# Patient Record
Sex: MALE | Race: WHITE | HISPANIC OR LATINO | Employment: UNEMPLOYED | URBAN - METROPOLITAN AREA
[De-identification: names, ages, dates, MRNs, and addresses within clinical notes are randomized per-mention and may not be internally consistent; named-entity substitution may affect disease eponyms.]

---

## 2020-09-22 ENCOUNTER — TELEPHONE (OUTPATIENT)
Dept: ONCOLOGY | Facility: CLINIC | Age: 40
End: 2020-09-22

## 2020-09-22 NOTE — TELEPHONE ENCOUNTER
Pt is requesting his wife Sanaz come to his new pt appt.     He has a bad speech impairment and cannot relay information.    Best call back # [phone]

## 2020-09-22 NOTE — TELEPHONE ENCOUNTER
----- Message from Mirta Alcazar RN sent at 9/22/2020  2:36 PM EDT -----  Regarding: VISITOR REQUEST  Pt is requesting his wife Sanaz come to his new pt appt.      He has a bad speech impairment and cannot relay information.     Best call back # [phone]

## 2020-10-08 NOTE — PROGRESS NOTES
.     REASON FOR CONSULTATION: Thrombocytopenia, iron deficiency anemia, alcoholic cirrhosis  Provide an opinion on any further workup or treatment                             REQUESTING PHYSICIAN: Omar Mims PA-C  RECORDS OBTAINED:  Records of the patient's history including those obtained from the referring provider were reviewed and summarized in detail.    HISTORY OF PRESENT ILLNESS:  The patient is a 40 y.o. year old male  who is here for follow-up with the above-mentioned history.    Last PCP note to 9/10/2020 reviewed  Recent hospitalization due to complications related alcoholism, bleeding esophageal varices, ascites.  Treated with esophageal banding, transfusion, paracentesis, 0.9 L removed.  Diagnosed with end-stage cirrhosis.  Sober x6 weeks.  Planned to move to [place] from [place] as he lost his job.  Was working as a .  Drank a lot more after he lost his job.  He was told he needed a liver transplant.  Active in  and motivated to stay sober.    Referred to Dr. Denis Magaña for cirrhosis.  Referred to Dr. Allyssa Choudhary for paroxysmal atrial fibrillation.    Referred to us for low platelets.    On 9/10/2020, WBC 5.4, Hb 9.1, .  Total bilirubin 2.2.  AST 69.  ALT 40.  Ferritin 14.  9% saturation.    Patient is here with his mother-in-law who is a retired nurse.    Denies bleeding other than one occasion of some blood on toilet paper at the end of wiping.  No blood mixed in stools.    Past Medical History:   Diagnosis Date   • Bleeding esophageal varices (CMS/HCC)    • Cirrhosis (CMS/HCC)     end-stage   • H/O PAF (paroxysmal atrial fibrillation) (CMS/HCC)    • Hepatosplenomegaly     secondary to cirrhosis   • History of alcohol abuse    • History of anemia    • History of ascites    • History of sepsis    • History of transfusion    • Hyperlipidemia      Past Surgical History:   Procedure Laterality Date   • ENDOSCOPY W/ BANDING  2020    varices   • HERNIA REPAIR     • PARACENTESIS       "900 cc fluid drained       MEDICATIONS    Current Outpatient Medications:   •  furosemide (LASIX) 20 MG tablet, Take 20 mg by mouth 2 (Two) Times a Day., Disp: , Rfl:   •  nadolol (CORGARD) 40 MG tablet, Take 40 mg by mouth Daily., Disp: , Rfl:   •  spironolactone (ALDACTONE) 50 MG tablet, Take 50 mg by mouth Daily., Disp: , Rfl:     ALLERGIES:   No Known Allergies    SOCIAL HISTORY:       Social History     Socioeconomic History   • Marital status:      Spouse name: Not on file   • Number of children: Not on file   • Years of education: Not on file   • Highest education level: Not on file   Occupational History   • Occupation: /     Comment: [employer] in [place]-lost his job     Employer: UNEMPLOYED   Tobacco Use   • Smoking status: Current Every Day Smoker     Packs/day: 1.00     Types: Cigarettes   Substance and Sexual Activity   • Alcohol use: Not Currently     Comment: H/O alcoholism   Social History Narrative    ** Merged History Encounter **              FAMILY HISTORY:  Family History   Problem Relation Age of Onset   • Ovarian cancer Mother    • Throat cancer Father        REVIEW OF SYSTEMS:  Review of Systems   Constitutional: Negative for activity change.   HENT: Negative for nosebleeds and trouble swallowing.    Respiratory: Negative for shortness of breath and wheezing.    Cardiovascular: Negative for chest pain and palpitations.   Gastrointestinal: Negative for constipation, diarrhea and nausea.   Genitourinary: Negative for dysuria and hematuria.   Musculoskeletal: Negative for arthralgias and myalgias.   Neurological: Negative for seizures and syncope.   Hematological: Negative for adenopathy. Does not bruise/bleed easily.   Psychiatric/Behavioral: Negative for confusion.              Vitals:    10/09/20 1349   BP: 96/65   Pulse: 66   Resp: 16   Temp: 98.2 °F (36.8 °C)   TempSrc: Skin   SpO2: 99%   Weight: 70 kg (154 lb 6.4 oz)   Height: 176.2 cm (69.37\")  Comment: NEW "   PainSc: 0-No pain     Current Status 10/9/2020   ECOG score 0      PHYSICAL EXAM:        CONSTITUTIONAL:  Vital signs reviewed.  No distress, looks comfortable.  EYES:  Conjunctiva and lids unremarkable.  PERRLA  EARS,NOSE,MOUTH,THROAT:  Ears and nose appear unremarkable.  Lips, teeth, gums appear unremarkable.  RESPIRATORY:  Normal respiratory effort.  Lungs clear to auscultation bilaterally.  CARDIOVASCULAR:  Normal S1, S2.  No murmurs rubs or gallops.  No significant lower extremity edema.  GASTROINTESTINAL: Abdomen appears unremarkable.  Nontender.  No hepatomegaly.  No splenomegaly.  LYMPHATIC:  No cervical, supraclavicular, axillary lymphadenopathy.  SKIN:  Warm.  No rashes.  PSYCHIATRIC:  Normal judgment and insight.  Normal mood and affect.         RECENT LABS:        WBC   Date Value Ref Range Status   10/09/2020 6.22 3.40 - 10.80 10*3/mm3 Final     Hemoglobin   Date Value Ref Range Status   10/09/2020 11.3 (L) 13.0 - 17.7 g/dL Final     Platelets   Date Value Ref Range Status   10/09/2020 92 (L) 140 - 450 10*3/mm3 Final   10/09/2020 95 (L) 140 - 450 10*3/mm3 Final       Assessment/Plan   Thrombocytopenia (CMS/HCC)  - Vitamin B12  - Folate RBC  - Immature Platelet Fraction  - Ferritin  - Iron Profile  - Retic With IRF & RET-He  - CBC & Differential  - Immature Platelet Fraction    Anemia, unspecified type  - Vitamin B12  - Folate RBC  - Immature Platelet Fraction  - Ferritin  - Iron Profile  - Retic With IRF & RET-He  - CBC & Differential    El Jiang     *Thrombocytopenia, due to alcoholic cirrhosis  · Unfortunately, there is no treatment to improve thrombocytopenia due to alcoholic cirrhosis.  I encouraged continued alcohol cessation, to try to avoid worsening of thrombocytopenia.  PLT 95    *Iron deficiency anemia.  · On 9/10/2020,  Hb 9.1, Ferritin 14.  9% saturation.  · Oral iron 1 tablet daily started 9/10/2020.  · On 10/9/2020, Hb 11.3 (improved with oral iron).  · Continue oral  iron    *Alcoholic cirrhosis  · He was told previously he needs a liver transplant.  · Sometime around August 2020, while in [place], hospitalization with banding of esophageal varices and 0.9 L ascitic fluid removed.  · He was referred to Dr. Denis Magaña    *Alcoholism  · No alcohol since 8/4/2020    *Paroxysmal atrial fibrillation.  Has an appointment with Dr. Allyssa Choudhary    Plan  · Continue oral iron  · Check B12 and RBC folate studies and IPF  · MD roughly 2 months.  Iron labs 1 week prior  · I do not expect his PLT count to improve.    His mother-in-law, retired nurse, assisted with history.  Peripheral smear was personally reviewed by me and looks unremarkable, other than mild thrombocytopenia      Send copy this to  Dr. Ardian Ibrahim, PCP

## 2020-10-09 ENCOUNTER — LAB (OUTPATIENT)
Dept: OTHER | Facility: HOSPITAL | Age: 40
End: 2020-10-09

## 2020-10-09 ENCOUNTER — CONSULT (OUTPATIENT)
Dept: ONCOLOGY | Facility: CLINIC | Age: 40
End: 2020-10-09

## 2020-10-09 VITALS
DIASTOLIC BLOOD PRESSURE: 65 MMHG | SYSTOLIC BLOOD PRESSURE: 96 MMHG | RESPIRATION RATE: 16 BRPM | HEIGHT: 69 IN | WEIGHT: 154.4 LBS | BODY MASS INDEX: 22.87 KG/M2 | TEMPERATURE: 98.2 F | OXYGEN SATURATION: 99 % | HEART RATE: 66 BPM

## 2020-10-09 DIAGNOSIS — D64.9 ANEMIA, UNSPECIFIED TYPE: ICD-10-CM

## 2020-10-09 DIAGNOSIS — D69.6 THROMBOCYTOPENIA (HCC): Primary | ICD-10-CM

## 2020-10-09 DIAGNOSIS — D69.6 THROMBOCYTOPENIC (HCC): Primary | ICD-10-CM

## 2020-10-09 LAB
BASOPHILS # BLD AUTO: 0.06 10*3/MM3 (ref 0–0.2)
BASOPHILS NFR BLD AUTO: 1 % (ref 0–1.5)
DEPRECATED RDW RBC AUTO: 65.3 FL (ref 37–54)
EOSINOPHIL # BLD AUTO: 0.41 10*3/MM3 (ref 0–0.4)
EOSINOPHIL NFR BLD AUTO: 6.6 % (ref 0.3–6.2)
ERYTHROCYTE [DISTWIDTH] IN BLOOD BY AUTOMATED COUNT: 20.7 % (ref 12.3–15.4)
HCT VFR BLD AUTO: 34.9 % (ref 37.5–51)
HGB BLD-MCNC: 11.3 G/DL (ref 13–17.7)
IMM GRANULOCYTES # BLD AUTO: 0.08 10*3/MM3 (ref 0–0.05)
IMM GRANULOCYTES NFR BLD AUTO: 1.3 % (ref 0–0.5)
LYMPHOCYTES # BLD AUTO: 1.7 10*3/MM3 (ref 0.7–3.1)
LYMPHOCYTES NFR BLD AUTO: 27.3 % (ref 19.6–45.3)
MCH RBC QN AUTO: 27.8 PG (ref 26.6–33)
MCHC RBC AUTO-ENTMCNC: 32.4 G/DL (ref 31.5–35.7)
MCV RBC AUTO: 86 FL (ref 79–97)
MONOCYTES # BLD AUTO: 0.9 10*3/MM3 (ref 0.1–0.9)
MONOCYTES NFR BLD AUTO: 14.5 % (ref 5–12)
NEUTROPHILS NFR BLD AUTO: 3.07 10*3/MM3 (ref 1.7–7)
NEUTROPHILS NFR BLD AUTO: 49.3 % (ref 42.7–76)
NRBC BLD AUTO-RTO: 0 /100 WBC (ref 0–0.2)
PLATELET # BLD AUTO: 92 10*3/MM3 (ref 140–450)
PLATELET # BLD AUTO: 95 10*3/MM3 (ref 140–450)
PLATELETS.RETICULATED NFR BLD AUTO: 4.1 % (ref 0.9–6.5)
PMV BLD AUTO: 11.2 FL (ref 6–12)
RBC # BLD AUTO: 4.06 10*6/MM3 (ref 4.14–5.8)
VIT B12 BLD-MCNC: 1490 PG/ML (ref 211–946)
WBC # BLD AUTO: 6.22 10*3/MM3 (ref 3.4–10.8)

## 2020-10-09 PROCEDURE — 99205 OFFICE O/P NEW HI 60 MIN: CPT | Performed by: INTERNAL MEDICINE

## 2020-10-09 PROCEDURE — 85025 COMPLETE CBC W/AUTO DIFF WBC: CPT | Performed by: INTERNAL MEDICINE

## 2020-10-09 PROCEDURE — 36415 COLL VENOUS BLD VENIPUNCTURE: CPT

## 2020-10-09 PROCEDURE — 85055 RETICULATED PLATELET ASSAY: CPT | Performed by: INTERNAL MEDICINE

## 2020-10-09 PROCEDURE — 82607 VITAMIN B-12: CPT | Performed by: INTERNAL MEDICINE

## 2020-10-09 RX ORDER — FUROSEMIDE 20 MG/1
40 TABLET ORAL DAILY
COMMUNITY
End: 2020-10-27

## 2020-10-09 RX ORDER — SPIRONOLACTONE 50 MG/1
50 TABLET, FILM COATED ORAL DAILY
COMMUNITY
End: 2020-10-27 | Stop reason: SDUPTHER

## 2020-10-09 RX ORDER — NADOLOL 40 MG/1
40 TABLET ORAL DAILY
COMMUNITY
End: 2020-10-27 | Stop reason: SDUPTHER

## 2020-10-13 ENCOUNTER — OFFICE VISIT (OUTPATIENT)
Dept: CARDIOLOGY | Facility: CLINIC | Age: 40
End: 2020-10-13

## 2020-10-13 VITALS
HEIGHT: 69 IN | WEIGHT: 157 LBS | DIASTOLIC BLOOD PRESSURE: 82 MMHG | SYSTOLIC BLOOD PRESSURE: 116 MMHG | BODY MASS INDEX: 23.25 KG/M2 | HEART RATE: 69 BPM

## 2020-10-13 DIAGNOSIS — F17.200 TOBACCO DEPENDENCE: ICD-10-CM

## 2020-10-13 DIAGNOSIS — D69.6 THROMBOCYTOPENIA (HCC): ICD-10-CM

## 2020-10-13 DIAGNOSIS — K70.31 ALCOHOLIC CIRRHOSIS OF LIVER WITH ASCITES (HCC): Chronic | ICD-10-CM

## 2020-10-13 DIAGNOSIS — I48.0 PAF (PAROXYSMAL ATRIAL FIBRILLATION) (HCC): Primary | Chronic | ICD-10-CM

## 2020-10-13 DIAGNOSIS — I85.11 SECONDARY ESOPHAGEAL VARICES WITH BLEEDING (HCC): ICD-10-CM

## 2020-10-13 DIAGNOSIS — F10.11 HISTORY OF ALCOHOL ABUSE: Chronic | ICD-10-CM

## 2020-10-13 PROCEDURE — 99203 OFFICE O/P NEW LOW 30 MIN: CPT | Performed by: INTERNAL MEDICINE

## 2020-10-13 PROCEDURE — 93000 ELECTROCARDIOGRAM COMPLETE: CPT | Performed by: INTERNAL MEDICINE

## 2020-10-13 NOTE — PROGRESS NOTES
Subjective:     Encounter Date:10/13/20      Patient ID: El Jiang is a 40 y.o. male.    Chief Complaint: AFIB  History of Present Illness    Dear Dr. Ibrahim,    I had the pleasure of seeing this patient in the office today for initial evaluation and consultation.  I appreciate that you sent him in to see us.  They come in today to be seen for cardiac evaluation; he potentially had an episode of atrial fibrillation when he was in the hospital in [place].    Patient was recently hospitalized in [place] at the [facility] there.  He presented with melena and GI bleed.  He has a history of heavy alcohol abuse, cirrhosis, and esophageal varices.  Hemoglobin was apparently 5.3 on arrival.  His mother-in-law, who is a retired nurse, states that he had atrial fibrillation for a couple of hours when he first presented.  I have gone through all the records that we have and I cannot find any documentation at all of atrial fibrillation.  There is no EKG that was obtained while he had that, and there is no mention in any the physician notes of atrial fibrillation.  I have looked at the ER notes, and the only reference I find in the nursing notes is to tachycardia.  So I am not sure if he truly had an episode of atrial fibrillation or whether he was simply tachycardia because of the physiologic stress.  He was not seen by cardiology while he was there, nor did he have any cardiac testing.    He is here now to follow-up for this potential episode of atrial fibrillation.  He is never had that before.  He has not had any sensation of palpitations or tachycardia since.  He used to drink heavy amounts of bourbon, but he has been sober.  They are now looking at potential liver transplant although he will be seeing Dr. Magaña for an initial visit soon.    This patient has no known cardiac history.  This patient has no history of coronary artery disease, congestive heart failure, rheumatic fever, rheumatic  heart disease, congenital heart disease or heart murmur.  This patient has never required invasive cardiovascular evaluation.    He also has thrombocytopenia, secondary to his cirrhosis, is just recently been seen by Dr. Conley.    The following portions of the patient's history were reviewed and updated as appropriate: allergies, current medications, past family history, past medical history, past social history, past surgical history and problem list.    Past Medical History:   Diagnosis Date   • Bleeding esophageal varices (CMS/HCC)    • Cirrhosis (CMS/HCC)     end-stage   • H/O PAF (paroxysmal atrial fibrillation) (CMS/HCC)    • Hepatosplenomegaly     secondary to cirrhosis   • History of alcohol abuse    • History of anemia    • History of ascites    • History of sepsis    • History of transfusion    • Hyperlipidemia        Past Surgical History:   Procedure Laterality Date   • ENDOSCOPY W/ BANDING  2020    varices   • HERNIA REPAIR     • PARACENTESIS      900 cc fluid drained       Social History     Socioeconomic History   • Marital status:      Spouse name: Not on file   • Number of children: Not on file   • Years of education: Not on file   • Highest education level: Not on file   Occupational History   • Occupation: /     Comment: [employer] in [place]-lost his job     Employer: UNEMPLOYED   Tobacco Use   • Smoking status: Current Every Day Smoker     Packs/day: 1.00     Types: Cigarettes   • Smokeless tobacco: Never Used   Substance and Sexual Activity   • Alcohol use: Not Currently     Comment: H/O alcoholism   Social History Narrative    ** Merged History Encounter **            Review of Systems   Constitution: Positive for malaise/fatigue. Negative for chills, decreased appetite, fever and night sweats.   HENT: Negative for ear discharge, ear pain, hearing loss, nosebleeds and sore throat.    Eyes: Negative for blurred vision, double vision and pain.   Cardiovascular: Negative  "for cyanosis.   Respiratory: Negative for hemoptysis and sputum production.    Endocrine: Negative for cold intolerance and heat intolerance.   Hematologic/Lymphatic: Negative for adenopathy.   Skin: Negative for dry skin, itching, nail changes, rash and suspicious lesions.   Musculoskeletal: Negative for arthritis, gout, muscle cramps, muscle weakness, myalgias and neck pain.   Gastrointestinal: Negative for anorexia, bowel incontinence, constipation, diarrhea, dysphagia, hematemesis and jaundice.   Genitourinary: Negative for bladder incontinence, dysuria, flank pain, frequency, hematuria and nocturia.   Neurological: Negative for focal weakness, numbness, paresthesias and seizures.   Psychiatric/Behavioral: Negative for altered mental status, hallucinations, hypervigilance, suicidal ideas and thoughts of violence.   Allergic/Immunologic: Negative for persistent infections.         ECG 12 Lead    Date/Time: 10/13/2020 1:53 PM  Performed by: Rupert Leroy III, MD  Authorized by: Rupert Leroy III, MD   Comparison: compared with previous ECG   Similar to previous ECG  Rhythm: sinus rhythm  Rate: normal  Conduction: conduction normal  ST Segments: ST segments normal  T Waves: T waves normal  QRS axis: normal  Other: no other findings    Clinical impression: normal ECG               Objective:     Vitals:    10/13/20 1305   BP: 116/82   Pulse: 69   Weight: 71.2 kg (157 lb)   Height: 176.2 cm (69.37\")         Vitals signs reviewed.   Constitutional:       General: Not in acute distress.     Appearance: Well-developed. Not diaphoretic.   Eyes:      General:         Right eye: No discharge.         Left eye: No discharge.      Conjunctiva/sclera: Conjunctivae normal.      Pupils: Pupils are equal, round, and reactive to light.   HENT:      Head: Normocephalic and atraumatic.      Nose: Nose normal.   Neck:      Musculoskeletal: Normal range of motion and neck supple.      Thyroid: No thyromegaly.      Trachea: No " tracheal deviation.      Lymphadenopathy: No cervical adenopathy.   Pulmonary:      Effort: Pulmonary effort is normal. No respiratory distress.      Breath sounds: Normal breath sounds. No stridor.   Chest:      Chest wall: Not tender to palpatation.   Cardiovascular:      Normal rate. Regular rhythm.      Murmurs: There is no murmur.      . No S3 gallop. No click. No rub.   Pulses:     Intact distal pulses.   Edema:     Peripheral edema absent.   Abdominal:      General: Bowel sounds are normal. There is no distension.      Palpations: Abdomen is soft. There is no abdominal mass.      Tenderness: There is no abdominal tenderness. There is no guarding or rebound.   Musculoskeletal: Normal range of motion.         General: No tenderness or deformity.   Skin:     General: Skin is warm and dry.      Findings: No erythema or rash.   Neurological:      Mental Status: Alert and oriented to person, place, and time.      Deep Tendon Reflexes: Reflexes are normal and symmetric.   Psychiatric:         Thought Content: Thought content normal.         Lab Review:             Performed        Assessment:          Diagnosis Plan   1. PAF (paroxysmal atrial fibrillation) (CMS/HCC)  Adult Transthoracic Echo Complete W/ Cont if Necessary Per Protocol    Holter Monitor - 24 Hour   2. Alcoholic cirrhosis of liver with ascites (CMS/HCC)     3. Secondary esophageal varices with bleeding (CMS/HCC)     4. Thrombocytopenia (CMS/HCC)     5. History of alcohol abuse     6. Tobacco dependence            Plan:       1.?  PAF- his mother-in-law who is a retired nurse understands that he had A. fib, although I cannot find this documented anywhere in the records.  He is on nadolol for his esophageal varices.  Given the question whether he had A. fib, along with his extreme alcoholic intake, I would arrange for an echocardiogram to be obtained to assess valvular and ventricular function, I will also have him wear a 24-hour Holter monitor.   Further evaluation and treatment will then be predicated the results  2.  Alcoholic cirrhosis with ascites and varices, recent bleeding varices-to be seen by Dr. Magaña of GI symptoms  3.  Tobacco dependence- ultimately, smoking cessation will be warranted and recommended, although certainly currently his focus is on staying sober and not starting alcohol intake again.    Thank you very much for allowing us to participate in the care of this pleasant patient.  Please don't hesitate to call if I can be of assistance in any way.      Current Outpatient Medications:   •  furosemide (LASIX) 20 MG tablet, Take 20 mg by mouth 2 (Two) Times a Day., Disp: , Rfl:   •  nadolol (CORGARD) 40 MG tablet, Take 40 mg by mouth Daily., Disp: , Rfl:   •  spironolactone (ALDACTONE) 50 MG tablet, Take 50 mg by mouth Daily., Disp: , Rfl:          No follow-ups on file.

## 2020-10-27 ENCOUNTER — OFFICE VISIT (OUTPATIENT)
Dept: GASTROENTEROLOGY | Facility: CLINIC | Age: 40
End: 2020-10-27

## 2020-10-27 VITALS — BODY MASS INDEX: 24.14 KG/M2 | WEIGHT: 165.2 LBS

## 2020-10-27 DIAGNOSIS — I85.10 SECONDARY ESOPHAGEAL VARICES WITHOUT BLEEDING (HCC): ICD-10-CM

## 2020-10-27 DIAGNOSIS — K70.31 ALCOHOLIC CIRRHOSIS OF LIVER WITH ASCITES (HCC): Primary | ICD-10-CM

## 2020-10-27 PROCEDURE — 99204 OFFICE O/P NEW MOD 45 MIN: CPT | Performed by: INTERNAL MEDICINE

## 2020-10-27 RX ORDER — NADOLOL 40 MG/1
40 TABLET ORAL DAILY
Qty: 90 TABLET | Refills: 3 | Status: SHIPPED | OUTPATIENT
Start: 2020-10-27 | End: 2021-03-17 | Stop reason: SDUPTHER

## 2020-10-27 RX ORDER — FERROUS SULFATE TAB EC 324 MG (65 MG FE EQUIVALENT) 324 (65 FE) MG
324 TABLET DELAYED RESPONSE ORAL
COMMUNITY

## 2020-10-27 RX ORDER — FUROSEMIDE 40 MG/1
40 TABLET ORAL DAILY
COMMUNITY
End: 2020-12-31 | Stop reason: SDUPTHER

## 2020-10-27 RX ORDER — SPIRONOLACTONE 50 MG/1
50 TABLET, FILM COATED ORAL DAILY
Qty: 90 TABLET | Refills: 3 | Status: SHIPPED | OUTPATIENT
Start: 2020-10-27 | End: 2021-02-12

## 2020-10-27 NOTE — PROGRESS NOTES
Chief Complaint   Patient presents with   • Jaundice   • Anemia   • Cirrhosis     El Jiang is a 40 y.o. male who presents with a History of cirrhosis with varices   HPI     Patient 40-year-old male with history of alcohol abuse as well as hyperlipidemia and cirrhosis.  Patient moved from [place] where he underwent band ligation of esophageal varices that were bleeding now here to establish care.Patient denies abdominal pain no nausea vomiting fever chills and no darkening of the urine or lightening of the stool.  Patient does complain of ongoing fatigue but otherwise doing well.Review of the chart from [place] otherwise unremarkable did's note that he was negative for hepatitis A B or C.    Past Medical History:   Diagnosis Date   • Bleeding esophageal varices (CMS/HCC)    • Cirrhosis (CMS/HCC)     end-stage   • H/O PAF (paroxysmal atrial fibrillation) (CMS/HCC)    • Hepatosplenomegaly     secondary to cirrhosis   • History of alcohol abuse    • History of anemia    • History of ascites    • History of sepsis    • History of transfusion    • Hyperlipidemia        Current Outpatient Medications:   •  ferrous sulfate 324 (65 Fe) MG tablet delayed-release EC tablet, Take 324 mg by mouth Daily With Breakfast., Disp: , Rfl:   •  furosemide (LASIX) 20 MG tablet, Take 40 mg by mouth Daily., Disp: , Rfl:   •  furosemide (LASIX) 40 MG tablet, Take 40 mg by mouth Daily., Disp: , Rfl:   •  nadolol (CORGARD) 40 MG tablet, Take 40 mg by mouth Daily., Disp: , Rfl:   •  spironolactone (ALDACTONE) 50 MG tablet, Take 50 mg by mouth Daily., Disp: , Rfl:   No Known Allergies  Social History     Socioeconomic History   • Marital status:      Spouse name: Not on file   • Number of children: Not on file   • Years of education: Not on file   • Highest education level: Not on file   Occupational History   • Occupation: /     Comment: [employer] in [place]-lost his job     Employer: UNEMPLOYED   Tobacco Use    • Smoking status: Current Every Day Smoker     Packs/day: 1.00     Types: Cigarettes   • Smokeless tobacco: Never Used   Substance and Sexual Activity   • Alcohol use: Not Currently     Comment: H/O alcoholism   Social History Narrative    ** Merged History Encounter **          Family History   Problem Relation Age of Onset   • Ovarian cancer Mother    • Throat cancer Father      Review of Systems   Constitutional: Positive for fatigue. Negative for activity change, appetite change, chills, diaphoresis, fever and unexpected weight change.   HENT: Negative.    Eyes: Negative.    Respiratory: Negative.    Cardiovascular: Negative.    Gastrointestinal: Negative.    Endocrine: Negative.    Musculoskeletal: Negative.    Skin: Negative.    Allergic/Immunologic: Negative.    Hematological: Negative.      There were no vitals filed for this visit.  Physical Exam  Vitals signs and nursing note reviewed.   Constitutional:       Appearance: He is well-developed.   HENT:      Head: Normocephalic and atraumatic.   Eyes:      General: No scleral icterus.     Conjunctiva/sclera: Conjunctivae normal.      Pupils: Pupils are equal, round, and reactive to light.   Neck:      Musculoskeletal: Normal range of motion.      Trachea: No tracheal deviation.   Cardiovascular:      Rate and Rhythm: Normal rate and regular rhythm.      Heart sounds: No murmur. No friction rub. No gallop.    Pulmonary:      Effort: Pulmonary effort is normal. No respiratory distress.      Breath sounds: Normal breath sounds. No wheezing or rales.   Abdominal:      General: Bowel sounds are normal. There is no distension.      Palpations: Abdomen is soft. There is no mass.      Tenderness: There is no abdominal tenderness. There is no guarding or rebound.   Musculoskeletal: Normal range of motion.         General: No swelling or tenderness.   Skin:     General: Skin is warm and dry.      Coloration: Skin is not jaundiced.      Findings: No rash.    Neurological:      General: No focal deficit present.      Mental Status: He is alert and oriented to person, place, and time.   Psychiatric:         Behavior: Behavior normal.         Judgment: Judgment normal.       Diagnoses and all orders for this visit:    Alcoholic cirrhosis of liver with ascites (CMS/HCC)  -     Comprehensive Metabolic Panel  -     CBC (No Diff)  -     Protime-INR  -     Hepatitis A Antibody, Total  -     Hepatitis B Surface Antibody  -     Case Request; Standing  -     Implement Anesthesia orders day of procedure.; Standing  -     Obtain informed consent; Standing  -     Case Request    Secondary esophageal varices without bleeding (CMS/HCC)  -     Case Request; Standing  -     Implement Anesthesia orders day of procedure.; Standing  -     Obtain informed consent; Standing  -     Case Request    Other orders  -     ferrous sulfate 324 (65 Fe) MG tablet delayed-release EC tablet; Take 324 mg by mouth Daily With Breakfast.  -     furosemide (LASIX) 40 MG tablet; Take 40 mg by mouth Daily.    Patient 40-year-old male with history of alcohol cirrhosis moved from [place] here to establish care.  Patient reports sober since 8/4/2020.  Patient hospitalized at that time found with esophageal varices and ascites status post paracentesis.  Repeat paracentesis was negative for significant residual.Patient currently complaining of fatigue with decreased lower extremity edema otherwise doing well with good appetite.  Will arrange labs to check meld score, arrange follow-up EGD as patient had band ligation performed in August and no repeat.Will monitor clinically and refer to transplant when indicated

## 2020-10-28 LAB
ALBUMIN SERPL-MCNC: 3.8 G/DL (ref 3.5–5.2)
ALBUMIN/GLOB SERPL: 1.2 G/DL
ALP SERPL-CCNC: 67 U/L (ref 39–117)
ALT SERPL-CCNC: 40 U/L (ref 1–41)
AST SERPL-CCNC: 71 U/L (ref 1–40)
BILIRUB SERPL-MCNC: 1.3 MG/DL (ref 0–1.2)
BUN SERPL-MCNC: 12 MG/DL (ref 6–20)
BUN/CREAT SERPL: 20.7 (ref 7–25)
CALCIUM SERPL-MCNC: 9.4 MG/DL (ref 8.6–10.5)
CHLORIDE SERPL-SCNC: 106 MMOL/L (ref 98–107)
CO2 SERPL-SCNC: 23.8 MMOL/L (ref 22–29)
CREAT SERPL-MCNC: 0.58 MG/DL (ref 0.76–1.27)
ERYTHROCYTE [DISTWIDTH] IN BLOOD BY AUTOMATED COUNT: 19.6 % (ref 12.3–15.4)
GLOBULIN SER CALC-MCNC: 3.1 GM/DL
GLUCOSE SERPL-MCNC: 104 MG/DL (ref 65–99)
HAV AB SER QL IA: POSITIVE
HBV SURFACE AB SER QL: NON REACTIVE
HCT VFR BLD AUTO: 34.7 % (ref 37.5–51)
HGB BLD-MCNC: 11.7 G/DL (ref 13–17.7)
INR PPP: 1.49 (ref 0.9–1.1)
MCH RBC QN AUTO: 29.1 PG (ref 26.6–33)
MCHC RBC AUTO-ENTMCNC: 33.7 G/DL (ref 31.5–35.7)
MCV RBC AUTO: 86.3 FL (ref 79–97)
PLATELET # BLD AUTO: 89 10*3/MM3 (ref 140–450)
POTASSIUM SERPL-SCNC: 4.1 MMOL/L (ref 3.5–5.2)
PROT SERPL-MCNC: 6.9 G/DL (ref 6–8.5)
PROTHROMBIN TIME: 17.7 SECONDS (ref 11.7–14.2)
RBC # BLD AUTO: 4.02 10*6/MM3 (ref 4.14–5.8)
SODIUM SERPL-SCNC: 140 MMOL/L (ref 136–145)
WBC # BLD AUTO: 5.65 10*3/MM3 (ref 3.4–10.8)

## 2020-11-02 ENCOUNTER — TRANSCRIBE ORDERS (OUTPATIENT)
Dept: SLEEP MEDICINE | Facility: HOSPITAL | Age: 40
End: 2020-11-02

## 2020-11-02 DIAGNOSIS — Z01.818 OTHER SPECIFIED PRE-OPERATIVE EXAMINATION: Primary | ICD-10-CM

## 2020-11-03 ENCOUNTER — TRANSCRIBE ORDERS (OUTPATIENT)
Dept: ADMINISTRATIVE | Facility: HOSPITAL | Age: 40
End: 2020-11-03

## 2020-11-03 ENCOUNTER — TELEPHONE (OUTPATIENT)
Dept: GASTROENTEROLOGY | Facility: CLINIC | Age: 40
End: 2020-11-03

## 2020-11-03 NOTE — TELEPHONE ENCOUNTER
----- Message from Sanaz Levy sent at 11/3/2020  2:52 PM EST -----  Contact: [phone]  Patient has a question regarding medication.

## 2020-11-04 NOTE — TELEPHONE ENCOUNTER
Returned phone call to patient's spoke with patient's spouse. She states Dr. Magaña was going to order something to help the patient sleep at night. Advised will send an update to dr. Magaña she verb understanding.

## 2020-11-04 NOTE — TELEPHONE ENCOUNTER
Meld score 12, await EGD.  We dont perscribe sleeping pills.  Need to f/u with PMD, soory if I gave the impression that is something we do

## 2020-11-10 ENCOUNTER — TELEPHONE (OUTPATIENT)
Dept: CARDIOLOGY | Facility: CLINIC | Age: 40
End: 2020-11-10

## 2020-11-10 ENCOUNTER — HOSPITAL ENCOUNTER (OUTPATIENT)
Dept: CARDIOLOGY | Facility: HOSPITAL | Age: 40
Discharge: HOME | End: 2020-11-10
Admitting: INTERNAL MEDICINE

## 2020-11-10 VITALS
OXYGEN SATURATION: 99 % | DIASTOLIC BLOOD PRESSURE: 60 MMHG | SYSTOLIC BLOOD PRESSURE: 90 MMHG | HEART RATE: 70 BPM | BODY MASS INDEX: 24.44 KG/M2 | WEIGHT: 165 LBS | HEIGHT: 69 IN

## 2020-11-10 DIAGNOSIS — I48.0 PAF (PAROXYSMAL ATRIAL FIBRILLATION) (HCC): ICD-10-CM

## 2020-11-10 PROCEDURE — 93306 TTE W/DOPPLER COMPLETE: CPT

## 2020-11-10 PROCEDURE — 93306 TTE W/DOPPLER COMPLETE: CPT | Performed by: INTERNAL MEDICINE

## 2020-11-10 NOTE — TELEPHONE ENCOUNTER
Attempted to call pt. No answer and no option to leave a VM. Will continue to try to reach him.    Thank you,    Rachelle Crook RN  Triage [facility]

## 2020-11-10 NOTE — TELEPHONE ENCOUNTER
----- Message from Rupert Leroy III, MD sent at 11/10/2020  2:50 PM EST -----  Please call- normal results

## 2020-11-11 ENCOUNTER — TELEPHONE (OUTPATIENT)
Dept: GASTROENTEROLOGY | Facility: CLINIC | Age: 40
End: 2020-11-11

## 2020-11-11 LAB
AORTIC ARCH: 2.2 CM
ASCENDING AORTA: 2.6 CM
BH CV ECHO MEAS - ACS: 2.6 CM
BH CV ECHO MEAS - AO MAX PG (FULL): 3.7 MMHG
BH CV ECHO MEAS - AO MAX PG: 8.1 MMHG
BH CV ECHO MEAS - AO MEAN PG (FULL): 2 MMHG
BH CV ECHO MEAS - AO MEAN PG: 4.5 MMHG
BH CV ECHO MEAS - AO ROOT AREA (BSA CORRECTED): 1.8
BH CV ECHO MEAS - AO ROOT AREA: 8.7 CM^2
BH CV ECHO MEAS - AO ROOT DIAM: 3.3 CM
BH CV ECHO MEAS - AO V2 MAX: 142.5 CM/SEC
BH CV ECHO MEAS - AO V2 MEAN: 100.9 CM/SEC
BH CV ECHO MEAS - AO V2 VTI: 31.2 CM
BH CV ECHO MEAS - ASC AORTA: 2.6 CM
BH CV ECHO MEAS - AVA(I,A): 2 CM^2
BH CV ECHO MEAS - AVA(I,D): 2 CM^2
BH CV ECHO MEAS - AVA(V,A): 2.4 CM^2
BH CV ECHO MEAS - AVA(V,D): 2.4 CM^2
BH CV ECHO MEAS - BSA(HAYCOCK): 1.9 M^2
BH CV ECHO MEAS - BSA: 1.9 M^2
BH CV ECHO MEAS - BZI_BMI: 24.4 KILOGRAMS/M^2
BH CV ECHO MEAS - BZI_METRIC_HEIGHT: 175.3 CM
BH CV ECHO MEAS - BZI_METRIC_WEIGHT: 74.8 KG
BH CV ECHO MEAS - EDV(MOD-SP2): 86 ML
BH CV ECHO MEAS - EDV(MOD-SP4): 89 ML
BH CV ECHO MEAS - EDV(TEICH): 128.2 ML
BH CV ECHO MEAS - EF(CUBED): 67.4 %
BH CV ECHO MEAS - EF(MOD-BP): 67 %
BH CV ECHO MEAS - EF(MOD-SP2): 65.1 %
BH CV ECHO MEAS - EF(MOD-SP4): 69.7 %
BH CV ECHO MEAS - EF(TEICH): 58.6 %
BH CV ECHO MEAS - ESV(MOD-SP2): 30 ML
BH CV ECHO MEAS - ESV(MOD-SP4): 27 ML
BH CV ECHO MEAS - ESV(TEICH): 53.1 ML
BH CV ECHO MEAS - FS: 31.2 %
BH CV ECHO MEAS - IVS/LVPW: 1
BH CV ECHO MEAS - IVSD: 1 CM
BH CV ECHO MEAS - LAT PEAK E' VEL: 12 CM/SEC
BH CV ECHO MEAS - LV DIASTOLIC VOL/BSA (35-75): 46.8 ML/M^2
BH CV ECHO MEAS - LV MASS(C)D: 194.2 GRAMS
BH CV ECHO MEAS - LV MASS(C)DI: 102 GRAMS/M^2
BH CV ECHO MEAS - LV MAX PG: 4.4 MMHG
BH CV ECHO MEAS - LV MEAN PG: 2.5 MMHG
BH CV ECHO MEAS - LV SYSTOLIC VOL/BSA (12-30): 14.2 ML/M^2
BH CV ECHO MEAS - LV V1 MAX: 105.4 CM/SEC
BH CV ECHO MEAS - LV V1 MEAN: 71.9 CM/SEC
BH CV ECHO MEAS - LV V1 VTI: 19 CM
BH CV ECHO MEAS - LVIDD: 5.2 CM
BH CV ECHO MEAS - LVIDS: 3.6 CM
BH CV ECHO MEAS - LVLD AP2: 7.9 CM
BH CV ECHO MEAS - LVLD AP4: 7.5 CM
BH CV ECHO MEAS - LVLS AP2: 6.1 CM
BH CV ECHO MEAS - LVLS AP4: 5.5 CM
BH CV ECHO MEAS - LVOT AREA (M): 3.1 CM^2
BH CV ECHO MEAS - LVOT AREA: 3.2 CM^2
BH CV ECHO MEAS - LVOT DIAM: 2 CM
BH CV ECHO MEAS - LVPWD: 1 CM
BH CV ECHO MEAS - MED PEAK E' VEL: 10.3 CM/SEC
BH CV ECHO MEAS - MV A DUR: 0.1 SEC
BH CV ECHO MEAS - MV A MAX VEL: 49.8 CM/SEC
BH CV ECHO MEAS - MV DEC SLOPE: 389.6 CM/SEC^2
BH CV ECHO MEAS - MV DEC TIME: 0.28 SEC
BH CV ECHO MEAS - MV E MAX VEL: 71.1 CM/SEC
BH CV ECHO MEAS - MV E/A: 1.4
BH CV ECHO MEAS - MV MAX PG: 4.4 MMHG
BH CV ECHO MEAS - MV MEAN PG: 1.8 MMHG
BH CV ECHO MEAS - MV P1/2T MAX VEL: 99.3 CM/SEC
BH CV ECHO MEAS - MV P1/2T: 74.6 MSEC
BH CV ECHO MEAS - MV V2 MAX: 104.7 CM/SEC
BH CV ECHO MEAS - MV V2 MEAN: 63.7 CM/SEC
BH CV ECHO MEAS - MV V2 VTI: 38 CM
BH CV ECHO MEAS - MVA P1/2T LCG: 2.2 CM^2
BH CV ECHO MEAS - MVA(P1/2T): 2.9 CM^2
BH CV ECHO MEAS - MVA(VTI): 1.6 CM^2
BH CV ECHO MEAS - PA ACC TIME: 0.07 SEC
BH CV ECHO MEAS - PA MAX PG (FULL): 0.49 MMHG
BH CV ECHO MEAS - PA MAX PG: 2.7 MMHG
BH CV ECHO MEAS - PA PR(ACCEL): 45.7 MMHG
BH CV ECHO MEAS - PA V2 MAX: 82.8 CM/SEC
BH CV ECHO MEAS - PULM A REVS DUR: 0.08 SEC
BH CV ECHO MEAS - PULM A REVS VEL: 26.6 CM/SEC
BH CV ECHO MEAS - PULM DIAS VEL: 54.4 CM/SEC
BH CV ECHO MEAS - PULM S/D: 1.1
BH CV ECHO MEAS - PULM SYS VEL: 57.4 CM/SEC
BH CV ECHO MEAS - PVA(V,A): 3.6 CM^2
BH CV ECHO MEAS - PVA(V,D): 3.6 CM^2
BH CV ECHO MEAS - QP/QS: 0.91
BH CV ECHO MEAS - RAP SYSTOLE: 8 MMHG
BH CV ECHO MEAS - RV MAX PG: 2.2 MMHG
BH CV ECHO MEAS - RV MEAN PG: 1.4 MMHG
BH CV ECHO MEAS - RV V1 MAX: 75 CM/SEC
BH CV ECHO MEAS - RV V1 MEAN: 56.2 CM/SEC
BH CV ECHO MEAS - RV V1 VTI: 14.1 CM
BH CV ECHO MEAS - RVOT AREA: 4 CM^2
BH CV ECHO MEAS - RVOT DIAM: 2.2 CM
BH CV ECHO MEAS - RVSP: 20 MMHG
BH CV ECHO MEAS - SI(AO): 143.1 ML/M^2
BH CV ECHO MEAS - SI(CUBED): 49.2 ML/M^2
BH CV ECHO MEAS - SI(LVOT): 32.3 ML/M^2
BH CV ECHO MEAS - SI(MOD-SP2): 29.4 ML/M^2
BH CV ECHO MEAS - SI(MOD-SP4): 32.6 ML/M^2
BH CV ECHO MEAS - SI(TEICH): 39.5 ML/M^2
BH CV ECHO MEAS - SUP REN AO DIAM: 2 CM
BH CV ECHO MEAS - SV(AO): 272.5 ML
BH CV ECHO MEAS - SV(CUBED): 93.6 ML
BH CV ECHO MEAS - SV(LVOT): 61.4 ML
BH CV ECHO MEAS - SV(MOD-SP2): 56 ML
BH CV ECHO MEAS - SV(MOD-SP4): 62 ML
BH CV ECHO MEAS - SV(RVOT): 56.1 ML
BH CV ECHO MEAS - SV(TEICH): 75.1 ML
BH CV ECHO MEAS - TAPSE (>1.6): 2 CM
BH CV ECHO MEAS - TR MAX VEL: 221.3 CM/SEC
BH CV ECHO MEASUREMENTS AVERAGE E/E' RATIO: 6.38
BH CV VAS BP RIGHT ARM: NORMAL MMHG
BH CV XLRA - RV BASE: 3.4 CM
BH CV XLRA - RV LENGTH: 7.3 CM
BH CV XLRA - RV MID: 3.3 CM
BH CV XLRA - TDI S': 10.4 CM/SEC
LEFT ATRIUM VOLUME INDEX: 16 ML/M2
MAXIMAL PREDICTED HEART RATE: 178 BPM
SINUS: 3.1 CM
STJ: 2.8 CM
STRESS TARGET HR: 151 BPM

## 2020-11-11 NOTE — TELEPHONE ENCOUNTER
----- Message from Erik Magaña MD sent at 11/1/2020  1:03 PM EST -----  Protein levels good, need vaccine for Hepatitis B, f/u at EGD

## 2020-11-12 NOTE — TELEPHONE ENCOUNTER
Notified pt's wife. She verbalized understanding.    Thank you,    Rachelle Crook, RN  Triage [facility]

## 2020-11-16 ENCOUNTER — LAB (OUTPATIENT)
Dept: LAB | Facility: HOSPITAL | Age: 40
End: 2020-11-16

## 2020-11-16 DIAGNOSIS — Z01.818 OTHER SPECIFIED PRE-OPERATIVE EXAMINATION: ICD-10-CM

## 2020-11-16 PROCEDURE — U0004 COV-19 TEST NON-CDC HGH THRU: HCPCS

## 2020-11-16 PROCEDURE — C9803 HOPD COVID-19 SPEC COLLECT: HCPCS

## 2020-11-17 LAB — SARS-COV-2 RNA RESP QL NAA+PROBE: NOT DETECTED

## 2020-11-18 ENCOUNTER — ANESTHESIA (OUTPATIENT)
Dept: GASTROENTEROLOGY | Facility: HOSPITAL | Age: 40
End: 2020-11-18

## 2020-11-18 ENCOUNTER — HOSPITAL ENCOUNTER (OUTPATIENT)
Facility: HOSPITAL | Age: 40
Setting detail: HOSPITAL OUTPATIENT SURGERY
Discharge: HOME | End: 2020-11-18
Attending: INTERNAL MEDICINE | Admitting: INTERNAL MEDICINE

## 2020-11-18 ENCOUNTER — ANESTHESIA EVENT (OUTPATIENT)
Dept: GASTROENTEROLOGY | Facility: HOSPITAL | Age: 40
End: 2020-11-18

## 2020-11-18 VITALS
HEIGHT: 69 IN | HEART RATE: 62 BPM | WEIGHT: 161 LBS | SYSTOLIC BLOOD PRESSURE: 114 MMHG | RESPIRATION RATE: 16 BRPM | BODY MASS INDEX: 23.85 KG/M2 | DIASTOLIC BLOOD PRESSURE: 78 MMHG | OXYGEN SATURATION: 100 %

## 2020-11-18 DIAGNOSIS — I85.10 SECONDARY ESOPHAGEAL VARICES WITHOUT BLEEDING (HCC): ICD-10-CM

## 2020-11-18 DIAGNOSIS — K70.31 ALCOHOLIC CIRRHOSIS OF LIVER WITH ASCITES (HCC): ICD-10-CM

## 2020-11-18 PROCEDURE — 43244 EGD VARICES LIGATION: CPT | Performed by: INTERNAL MEDICINE

## 2020-11-18 PROCEDURE — 25010000002 PROPOFOL 10 MG/ML EMULSION: Performed by: ANESTHESIOLOGY

## 2020-11-18 RX ORDER — SODIUM CHLORIDE, SODIUM LACTATE, POTASSIUM CHLORIDE, CALCIUM CHLORIDE 600; 310; 30; 20 MG/100ML; MG/100ML; MG/100ML; MG/100ML
1000 INJECTION, SOLUTION INTRAVENOUS CONTINUOUS
Status: DISCONTINUED | OUTPATIENT
Start: 2020-11-18 | End: 2020-11-18 | Stop reason: HOSPADM

## 2020-11-18 RX ORDER — SUCRALFATE 1 G/1
1 TABLET ORAL 4 TIMES DAILY
Qty: 120 TABLET | Refills: 0 | Status: SHIPPED | OUTPATIENT
Start: 2020-11-18 | End: 2021-03-17 | Stop reason: SDUPTHER

## 2020-11-18 RX ORDER — LIDOCAINE HYDROCHLORIDE 20 MG/ML
INJECTION, SOLUTION INFILTRATION; PERINEURAL AS NEEDED
Status: DISCONTINUED | OUTPATIENT
Start: 2020-11-18 | End: 2020-11-18 | Stop reason: SURG

## 2020-11-18 RX ORDER — PROPOFOL 10 MG/ML
VIAL (ML) INTRAVENOUS AS NEEDED
Status: DISCONTINUED | OUTPATIENT
Start: 2020-11-18 | End: 2020-11-18 | Stop reason: SURG

## 2020-11-18 RX ADMIN — PROPOFOL 50 MG: 10 INJECTION, EMULSION INTRAVENOUS at 11:59

## 2020-11-18 RX ADMIN — SODIUM CHLORIDE, POTASSIUM CHLORIDE, SODIUM LACTATE AND CALCIUM CHLORIDE 1000 ML: 600; 310; 30; 20 INJECTION, SOLUTION INTRAVENOUS at 11:21

## 2020-11-18 RX ADMIN — PROPOFOL 50 MG: 10 INJECTION, EMULSION INTRAVENOUS at 11:55

## 2020-11-18 RX ADMIN — PROPOFOL 50 MG: 10 INJECTION, EMULSION INTRAVENOUS at 12:03

## 2020-11-18 RX ADMIN — LIDOCAINE HYDROCHLORIDE 40 MG: 20 INJECTION, SOLUTION INFILTRATION; PERINEURAL at 11:53

## 2020-11-18 NOTE — ANESTHESIA PREPROCEDURE EVALUATION
Anesthesia Evaluation     Patient summary reviewed and Nursing notes reviewed   NPO Solid Status: > 8 hours  NPO Liquid Status: > 2 hours           Airway   Mallampati: II  TM distance: >3 FB  Neck ROM: full  Dental - normal exam     Pulmonary - normal exam   (+) a smoker Current Smoked day of surgery,   Cardiovascular - normal exam    (+) dysrhythmias Atrial Fib, hyperlipidemia,     ROS comment: Alcoholic cirrhosis of liver with ascites     Neuro/Psych- negative ROS  GI/Hepatic/Renal/Endo    (+)  GI bleeding , liver disease,     ROS Comment: Hepatosplenomegaly    Musculoskeletal (-) negative ROS    Abdominal    Substance History - negative use     OB/GYN negative ob/gyn ROS         Other                        Anesthesia Plan    ASA 4     MAC       Anesthetic plan, all risks, benefits, and alternatives have been provided, discussed and informed consent has been obtained with: patient.

## 2020-11-18 NOTE — BRIEF OP NOTE
ESOPHAGOGASTRODUODENOSCOPY  Progress Note    El Jiang  11/18/2020    Pre-op Diagnosis:   Alcoholic cirrhosis of liver with ascites (CMS/HCC) [K70.31]  Secondary esophageal varices without bleeding (CMS/HCC) [I85.10]       Post-Op Diagnosis Codes:     * Alcoholic cirrhosis of liver with ascites (CMS/HCC) [K70.31]     * Secondary esophageal varices without bleeding (CMS/HCC) [I85.10]     * Portal hypertensive gastropathy (CMS/HCC) [K76.6, K31.89]    Procedure/CPT® Codes:        Procedure(s):  ESOPHAGOGASTRODUODENOSCOPY with esophageal banding x3    Surgeon(s):  Erik Magaña MD    Anesthesia: Monitored Anesthesia Care    Staff:   Endo Technician: Vito Wilkins RN; Pattie Pritchard RN  Endo Nurse: Susan Jasso RN         Estimated Blood Loss: minimal    Urine Voided: * No values recorded between 11/18/2020 11:43 AM and 11/18/2020 12:06 PM *    Specimens:                None          Drains: * No LDAs found *    Findings: EGD revealed several residual varices from the band ligation performed x3 to good effect.  Portal hypertensive gastropathy was identified and duodenal mucosa was normal.    Complications: None          Erik Magaña MD     Date: 11/18/2020  Time: 12:06 EST

## 2020-11-18 NOTE — ANESTHESIA POSTPROCEDURE EVALUATION
"Patient: El Jiang    Procedure Summary     Date: 11/18/20 Room / Location: [facility] ENDOSCOPY 8 /  [facility]    Anesthesia Start: 1145 Anesthesia Stop: 1210    Procedure: ESOPHAGOGASTRODUODENOSCOPY with esophageal banding x3 (N/A Esophagus) Diagnosis:       Alcoholic cirrhosis of liver with ascites (CMS/HCC)      Secondary esophageal varices without bleeding (CMS/HCC)      Portal hypertensive gastropathy (CMS/HCC)      (Alcoholic cirrhosis of liver with ascites (CMS/HCC) [K70.31])      (Secondary esophageal varices without bleeding (CMS/HCC) [I85.10])    Surgeon: Erik Magaña MD Provider: Praful Linder MD    Anesthesia Type: MAC ASA Status: 4          Anesthesia Type: MAC    Vitals  No vitals data found for the desired time range.          Post Anesthesia Care and Evaluation    Patient location during evaluation: bedside  Patient participation: complete - patient participated  Level of consciousness: awake  Pain score: 2  Pain management: adequate  Airway patency: patent  Anesthetic complications: No anesthetic complications  PONV Status: none  Cardiovascular status: acceptable  Respiratory status: acceptable  Hydration status: acceptable    Comments: /70 (BP Location: Left arm, Patient Position: Lying)   Pulse 59   Resp 18   Ht 175.3 cm (69\")   Wt 73 kg (161 lb)   SpO2 100%   BMI 23.78 kg/m²         "

## 2020-12-11 ENCOUNTER — LAB (OUTPATIENT)
Dept: OTHER | Facility: HOSPITAL | Age: 40
End: 2020-12-11

## 2020-12-11 DIAGNOSIS — D69.6 THROMBOCYTOPENIA (HCC): ICD-10-CM

## 2020-12-11 DIAGNOSIS — D64.9 ANEMIA, UNSPECIFIED TYPE: ICD-10-CM

## 2020-12-11 LAB
BASOPHILS # BLD AUTO: 0.04 10*3/MM3 (ref 0–0.2)
BASOPHILS NFR BLD AUTO: 0.9 % (ref 0–1.5)
DEPRECATED RDW RBC AUTO: 53.3 FL (ref 37–54)
EOSINOPHIL # BLD AUTO: 0.32 10*3/MM3 (ref 0–0.4)
EOSINOPHIL NFR BLD AUTO: 6.8 % (ref 0.3–6.2)
ERYTHROCYTE [DISTWIDTH] IN BLOOD BY AUTOMATED COUNT: 15.8 % (ref 12.3–15.4)
FERRITIN SERPL-MCNC: 102.6 NG/ML (ref 30–400)
HCT VFR BLD AUTO: 39.7 % (ref 37.5–51)
HGB BLD-MCNC: 13.1 G/DL (ref 13–17.7)
HGB RETIC QN AUTO: 35.7 PG (ref 29.8–36.1)
IMM GRANULOCYTES # BLD AUTO: 0.01 10*3/MM3 (ref 0–0.05)
IMM GRANULOCYTES NFR BLD AUTO: 0.2 % (ref 0–0.5)
IMM RETICS NFR: 5.5 % (ref 3–15.8)
IRON 24H UR-MRATE: 133 MCG/DL (ref 59–158)
IRON SATN MFR SERPL: 34 % (ref 20–50)
LYMPHOCYTES # BLD AUTO: 1.25 10*3/MM3 (ref 0.7–3.1)
LYMPHOCYTES NFR BLD AUTO: 26.7 % (ref 19.6–45.3)
MCH RBC QN AUTO: 30.3 PG (ref 26.6–33)
MCHC RBC AUTO-ENTMCNC: 33 G/DL (ref 31.5–35.7)
MCV RBC AUTO: 91.7 FL (ref 79–97)
MONOCYTES # BLD AUTO: 0.4 10*3/MM3 (ref 0.1–0.9)
MONOCYTES NFR BLD AUTO: 8.5 % (ref 5–12)
NEUTROPHILS NFR BLD AUTO: 2.66 10*3/MM3 (ref 1.7–7)
NEUTROPHILS NFR BLD AUTO: 56.9 % (ref 42.7–76)
NRBC BLD AUTO-RTO: 0 /100 WBC (ref 0–0.2)
PLATELET # BLD AUTO: 81 10*3/MM3 (ref 140–450)
PLATELET # BLD AUTO: 81 10*3/MM3 (ref 140–450)
PLATELETS.RETICULATED NFR BLD AUTO: 4 % (ref 0.9–6.5)
PMV BLD AUTO: 10.4 FL (ref 6–12)
RBC # BLD AUTO: 4.33 10*6/MM3 (ref 4.14–5.8)
RETICS # AUTO: 0.06 10*6/MM3 (ref 0.02–0.13)
RETICS/RBC NFR AUTO: 1.37 % (ref 0.7–1.9)
TIBC SERPL-MCNC: 395 MCG/DL (ref 298–536)
TRANSFERRIN SERPL-MCNC: 265 MG/DL (ref 200–360)
WBC # BLD AUTO: 4.68 10*3/MM3 (ref 3.4–10.8)

## 2020-12-11 PROCEDURE — 82728 ASSAY OF FERRITIN: CPT | Performed by: INTERNAL MEDICINE

## 2020-12-11 PROCEDURE — 84466 ASSAY OF TRANSFERRIN: CPT | Performed by: INTERNAL MEDICINE

## 2020-12-11 PROCEDURE — 36415 COLL VENOUS BLD VENIPUNCTURE: CPT

## 2020-12-11 PROCEDURE — 85055 RETICULATED PLATELET ASSAY: CPT | Performed by: INTERNAL MEDICINE

## 2020-12-11 PROCEDURE — 83540 ASSAY OF IRON: CPT | Performed by: INTERNAL MEDICINE

## 2020-12-11 PROCEDURE — 85025 COMPLETE CBC W/AUTO DIFF WBC: CPT | Performed by: INTERNAL MEDICINE

## 2020-12-11 PROCEDURE — 85046 RETICYTE/HGB CONCENTRATE: CPT | Performed by: INTERNAL MEDICINE

## 2020-12-18 ENCOUNTER — OFFICE VISIT (OUTPATIENT)
Dept: ONCOLOGY | Facility: CLINIC | Age: 40
End: 2020-12-18

## 2020-12-18 ENCOUNTER — APPOINTMENT (OUTPATIENT)
Dept: OTHER | Facility: HOSPITAL | Age: 40
End: 2020-12-18

## 2020-12-18 VITALS
OXYGEN SATURATION: 100 % | WEIGHT: 155.5 LBS | SYSTOLIC BLOOD PRESSURE: 107 MMHG | RESPIRATION RATE: 18 BRPM | BODY MASS INDEX: 23.03 KG/M2 | TEMPERATURE: 98 F | HEART RATE: 70 BPM | HEIGHT: 69 IN | DIASTOLIC BLOOD PRESSURE: 67 MMHG

## 2020-12-18 DIAGNOSIS — D69.6 THROMBOCYTOPENIA (HCC): Primary | ICD-10-CM

## 2020-12-18 PROCEDURE — 99214 OFFICE O/P EST MOD 30 MIN: CPT | Performed by: INTERNAL MEDICINE

## 2020-12-18 NOTE — PROGRESS NOTES
"    .     REASON FOR FOLLOWUP : Thrombocytopenia, iron deficiency anemia, alcoholic cirrhosis    HISTORY OF PRESENT ILLNESS:  The patient is a 40 y.o. year old male  who is here for follow-up with the above-mentioned history.    No new issues.  Continues to have intermittent mild amounts of blood in his stools.  States Dr. Magaña is aware of this and just had a EGD 11/18/2020    No chest pain or shortness of breath.  No GI side effects from oral iron.  Continues oral iron once per day.    Past Medical History:   Diagnosis Date   • Acquired stuttering     WIFE STATES HE CAN BE HARD TO UNDERSTAND.. \"[place] ACCENT AND STUTTERS\"   • Bleeding esophageal varices (CMS/HCC)    • Cirrhosis (CMS/HCC)     end-stage   • H/O PAF (paroxysmal atrial fibrillation) (CMS/HCC)    • Hepatosplenomegaly     secondary to cirrhosis   • History of alcohol abuse    • History of anemia    • History of ascites    • History of sepsis    • History of transfusion    • Hyperlipidemia      Past Surgical History:   Procedure Laterality Date   • ENDOSCOPY N/A 11/18/2020    Procedure: ESOPHAGOGASTRODUODENOSCOPY with esophageal banding x3;  Surgeon: Erik Magaña MD;  Location: [facility] ENDOSCOPY;  Service: Gastroenterology;  Laterality: N/A;  pre - cirrhosis of the liver and varices  post - gastritis, portal hypertensive gastropathy   • ENDOSCOPY W/ BANDING  2020    varices   • HERNIA REPAIR     • PARACENTESIS      900 cc fluid drained       MEDICATIONS    Current Outpatient Medications:   •  ferrous sulfate 324 (65 Fe) MG tablet delayed-release EC tablet, Take 324 mg by mouth Daily With Breakfast., Disp: , Rfl:   •  furosemide (LASIX) 40 MG tablet, Take 40 mg by mouth Daily., Disp: , Rfl:   •  nadolol (CORGARD) 40 MG tablet, Take 1 tablet by mouth Daily., Disp: 90 tablet, Rfl: 3  •  spironolactone (ALDACTONE) 50 MG tablet, Take 1 tablet by mouth Daily., Disp: 90 tablet, Rfl: 3  •  sucralfate (Carafate) 1 g tablet, Take 1 tablet by mouth 4 (Four) " "Times a Day. Dissolve in 10 to 15 cc water, Disp: 120 tablet, Rfl: 0    ALLERGIES:   No Known Allergies    SOCIAL HISTORY:       Social History     Socioeconomic History   • Marital status:      Spouse name: Not on file   • Number of children: Not on file   • Years of education: Not on file   • Highest education level: Not on file   Occupational History   • Occupation: /     Comment: [employer] in [place]-lost his job     Employer: UNEMPLOYED   Tobacco Use   • Smoking status: Current Every Day Smoker     Packs/day: 1.00     Types: Cigarettes   • Smokeless tobacco: Never Used   Substance and Sexual Activity   • Alcohol use: Not Currently     Comment: H/O alcoholism   • Drug use: Yes     Frequency: 1.0 times per week     Types: Marijuana   • Sexual activity: Defer   Social History Narrative    ** Merged History Encounter **              FAMILY HISTORY:  Family History   Problem Relation Age of Onset   • Ovarian cancer Mother    • Throat cancer Father    • Malig Hyperthermia Neg Hx        REVIEW OF SYSTEMS:  Review of Systems   Constitutional: Negative for activity change.   HENT: Negative for nosebleeds and trouble swallowing.    Respiratory: Negative for shortness of breath and wheezing.    Cardiovascular: Negative for chest pain and palpitations.   Gastrointestinal: Negative for constipation, diarrhea and nausea.   Genitourinary: Negative for dysuria and hematuria.   Musculoskeletal: Negative for arthralgias and myalgias.   Neurological: Negative for seizures and syncope.   Hematological: Negative for adenopathy. Does not bruise/bleed easily.   Psychiatric/Behavioral: Negative for confusion.              Vitals:    12/18/20 1358   BP: 107/67   Pulse: 70   Resp: 18   Temp: 98 °F (36.7 °C)   TempSrc: Temporal   SpO2: 100%   Weight: 70.5 kg (155 lb 8 oz)   Height: 176.2 cm (69.37\")   PainSc: 0-No pain     Current Status 12/18/2020   ECOG score 0      PHYSICAL EXAM:        CONSTITUTIONAL:  " Vital signs reviewed.  No distress, looks comfortable.  EYES:  Conjunctiva and lids unremarkable.  PERRLA  EARS,NOSE,MOUTH,THROAT:  Ears and nose appear unremarkable.  Lips, teeth, gums appear unremarkable.  RESPIRATORY:  Normal respiratory effort.  Lungs clear to auscultation bilaterally.  CARDIOVASCULAR:  Normal S1, S2.  No murmurs rubs or gallops.  No significant lower extremity edema.  GASTROINTESTINAL: Abdomen appears unremarkable.  Nontender.  No hepatomegaly.  No splenomegaly.  LYMPHATIC:  No cervical, supraclavicular, axillary lymphadenopathy.  SKIN:  Warm.  No rashes.  PSYCHIATRIC:  Normal judgment and insight.  Normal mood and affect.       RECENT LABS:        WBC   Date Value Ref Range Status   12/11/2020 4.68 3.40 - 10.80 10*3/mm3 Final   10/27/2020 5.65 3.40 - 10.80 10*3/mm3 Final   10/09/2020 6.22 3.40 - 10.80 10*3/mm3 Final     Hemoglobin   Date Value Ref Range Status   12/11/2020 13.1 13.0 - 17.7 g/dL Final   10/27/2020 11.7 (L) 13.0 - 17.7 g/dL Final   10/09/2020 11.3 (L) 13.0 - 17.7 g/dL Final     Platelets   Date Value Ref Range Status   12/11/2020 81 (L) 140 - 450 10*3/mm3 Final   12/11/2020 81 (L) 140 - 450 10*3/mm3 Final   10/27/2020 89 (L) 140 - 450 10*3/mm3 Final   10/09/2020 92 (L) 140 - 450 10*3/mm3 Final   10/09/2020 95 (L) 140 - 450 10*3/mm3 Final       Assessment/Plan   Thrombocytopenia (CMS/HCC)  - Ferritin  - Iron Profile  - CBC & Differential  - Retic With IRF & RET-He  - Folate RBC    El Jiang     *Thrombocytopenia, due to alcoholic cirrhosis  · Unfortunately, there is no treatment to improve thrombocytopenia due to alcoholic cirrhosis.  I encouraged continued alcohol cessation, to try to avoid worsening of thrombocytopenia.  PLT 81    *Iron deficiency anemia.  · On 9/10/2020,  Hb 9.1, Ferritin 14.  9% saturation.  · Oral iron 1 tablet daily started 9/10/2020.  · On 10/9/2020, Hb 11.3 (improved with oral iron).  · On 12/11/2020, ferritin 102, 34% saturation, Hb  13.1  · Continue oral iron daily  · (Ongoing intermittent mild blood in stools)    *Source of iron deficiency  · Grade 2 esophageal varices on EGD, Dr. Magaña, 11/18/2020.  Banded.  Moderate portal hypertensive gastropathy.    *Alcoholic cirrhosis  · He was told previously he needs a liver transplant.  · Sometime around August 2020, while in [place], hospitalization with banding of esophageal varices and 0.9 L ascitic fluid removed.  · He was referred to Dr. Denis Magaña    *Alcoholism  · Still no alcohol since 8/4/2020    *Paroxysmal atrial fibrillation.    · Evaluated by Dr. Rupert Leroy on 10/13/2020.  Dr. Leroy stated although the patient's mother-in-law who is a retired nurse understands the patient had A. fib, Dr. Leroy states he could not to find anywhere in the records documentation of A. 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  1 week prior: Iron labs (and RBC folate as I do not see this is been checked in the past).  · I do not expect his PLT count to improve.        Send copy this to  Dr. Adrian Ibrahim, PCP

## 2020-12-30 ENCOUNTER — TELEPHONE (OUTPATIENT)
Dept: GASTROENTEROLOGY | Facility: CLINIC | Age: 40
End: 2020-12-30

## 2020-12-30 RX ORDER — SUCRALFATE 1 G/1
TABLET ORAL
Qty: 120 TABLET | Refills: 0 | OUTPATIENT
Start: 2020-12-30

## 2020-12-30 NOTE — TELEPHONE ENCOUNTER
----- Message from Aleisha Osito sent at 12/30/2020  3:19 PM EST -----  Regarding: medication refill  Pt lm on vm needs refill on     Documenting Provider Encounter Provider  Provider, Teresa, Erik Rosas MD  Outpatient Medication Detail     Disp Refills Start End   furosemide (LASIX) 40 MG tablet       Sig - Route: Take 40 mg by mouth Daily. - Oral   Class: Historical Med   Pharmacy    [facility] [address] [place], [place] - [address] AT [facility] - [phone] [facility] [phone] FX    Please call [phone]

## 2020-12-31 ENCOUNTER — TELEPHONE (OUTPATIENT)
Dept: GASTROENTEROLOGY | Facility: CLINIC | Age: 40
End: 2020-12-31

## 2020-12-31 RX ORDER — FUROSEMIDE 40 MG/1
40 TABLET ORAL DAILY
Qty: 90 TABLET | Refills: 3 | Status: SHIPPED | OUTPATIENT
Start: 2020-12-31 | End: 2021-03-17 | Stop reason: SDUPTHER

## 2020-12-31 NOTE — TELEPHONE ENCOUNTER
----- Message from Krista Li Rep sent at 12/31/2020 12:01 PM EST -----  Regarding: trazodone  Contact: [phone]  Pt is wanting to know if it is safe for him to take trazodone that his dr prescribed him.

## 2021-02-12 ENCOUNTER — TELEPHONE (OUTPATIENT)
Dept: GASTROENTEROLOGY | Facility: CLINIC | Age: 41
End: 2021-02-12

## 2021-02-12 ENCOUNTER — OFFICE VISIT (OUTPATIENT)
Dept: GASTROENTEROLOGY | Facility: CLINIC | Age: 41
End: 2021-02-12

## 2021-02-12 VITALS — WEIGHT: 157 LBS | TEMPERATURE: 98 F | BODY MASS INDEX: 23.25 KG/M2 | HEIGHT: 69 IN

## 2021-02-12 DIAGNOSIS — K80.20 GALLSTONES: ICD-10-CM

## 2021-02-12 DIAGNOSIS — R10.10 PAIN OF UPPER ABDOMEN: ICD-10-CM

## 2021-02-12 DIAGNOSIS — K70.31 ALCOHOLIC CIRRHOSIS OF LIVER WITH ASCITES (HCC): Primary | ICD-10-CM

## 2021-02-12 LAB
ALBUMIN SERPL-MCNC: 4 G/DL (ref 3.5–5.2)
ALBUMIN/GLOB SERPL: 1.4 G/DL
ALP SERPL-CCNC: 79 U/L (ref 39–117)
ALT SERPL-CCNC: 44 U/L (ref 1–41)
AST SERPL-CCNC: 67 U/L (ref 1–40)
BASOPHILS # BLD AUTO: ABNORMAL 10*3/UL
BILIRUB SERPL-MCNC: 2.2 MG/DL (ref 0–1.2)
BUN SERPL-MCNC: 12 MG/DL (ref 6–20)
BUN/CREAT SERPL: 17.1 (ref 7–25)
CALCIUM SERPL-MCNC: 9.5 MG/DL (ref 8.6–10.5)
CHLORIDE SERPL-SCNC: 104 MMOL/L (ref 98–107)
CO2 SERPL-SCNC: 24.1 MMOL/L (ref 22–29)
CREAT SERPL-MCNC: 0.7 MG/DL (ref 0.76–1.27)
DIFFERENTIAL COMMENT: ABNORMAL
EOSINOPHIL # BLD AUTO: ABNORMAL 10*3/UL
EOSINOPHIL # BLD MANUAL: 0.34 10*3/MM3 (ref 0–0.4)
EOSINOPHIL NFR BLD AUTO: ABNORMAL %
EOSINOPHIL NFR BLD MANUAL: 6.1 % (ref 0.3–6.2)
ERYTHROCYTE [DISTWIDTH] IN BLOOD BY AUTOMATED COUNT: 14.4 % (ref 12.3–15.4)
GLOBULIN SER CALC-MCNC: 2.9 GM/DL
GLUCOSE SERPL-MCNC: 92 MG/DL (ref 65–99)
HCT VFR BLD AUTO: 41.5 % (ref 37.5–51)
HGB BLD-MCNC: 14.1 G/DL (ref 13–17.7)
LYMPHOCYTES # BLD AUTO: ABNORMAL 10*3/UL
LYMPHOCYTES # BLD MANUAL: 1 10*3/MM3 (ref 0.7–3.1)
LYMPHOCYTES NFR BLD AUTO: ABNORMAL %
LYMPHOCYTES NFR BLD MANUAL: 18.2 % (ref 19.6–45.3)
MCH RBC QN AUTO: 31.4 PG (ref 26.6–33)
MCHC RBC AUTO-ENTMCNC: 34 G/DL (ref 31.5–35.7)
MCV RBC AUTO: 92.4 FL (ref 79–97)
MONOCYTES # BLD MANUAL: 0.17 10*3/MM3 (ref 0.1–0.9)
MONOCYTES NFR BLD AUTO: ABNORMAL %
MONOCYTES NFR BLD MANUAL: 3 % (ref 5–12)
NEUTROPHILS # BLD MANUAL: 4 10*3/MM3 (ref 1.7–7)
NEUTROPHILS NFR BLD AUTO: ABNORMAL %
NEUTROPHILS NFR BLD MANUAL: 72.7 % (ref 42.7–76)
PLATELET # BLD AUTO: 76 10*3/MM3 (ref 140–450)
PLATELET BLD QL SMEAR: ABNORMAL
POTASSIUM SERPL-SCNC: 4.5 MMOL/L (ref 3.5–5.2)
PROT SERPL-MCNC: 6.9 G/DL (ref 6–8.5)
RBC # BLD AUTO: 4.49 10*6/MM3 (ref 4.14–5.8)
RBC MORPH BLD: ABNORMAL
SODIUM SERPL-SCNC: 139 MMOL/L (ref 136–145)
WBC # BLD AUTO: 5.5 10*3/MM3 (ref 3.4–10.8)

## 2021-02-12 PROCEDURE — 99214 OFFICE O/P EST MOD 30 MIN: CPT | Performed by: NURSE PRACTITIONER

## 2021-02-12 RX ORDER — SPIRONOLACTONE 25 MG/1
25 TABLET ORAL DAILY
Qty: 90 TABLET | Refills: 3 | Status: SHIPPED | OUTPATIENT
Start: 2021-02-12 | End: 2021-03-17 | Stop reason: SDUPTHER

## 2021-02-12 RX ORDER — SODIUM PHOSPHATE,MONO-DIBASIC 19G-7G/118
ENEMA (ML) RECTAL DAILY
COMMUNITY

## 2021-02-12 RX ORDER — PANTOPRAZOLE SODIUM 40 MG/1
40 TABLET, DELAYED RELEASE ORAL DAILY
Qty: 30 TABLET | Refills: 5 | Status: SHIPPED | OUTPATIENT
Start: 2021-02-12 | End: 2021-03-17 | Stop reason: SDUPTHER

## 2021-02-12 RX ORDER — TRAZODONE HYDROCHLORIDE 50 MG/1
50 TABLET ORAL NIGHTLY PRN
Status: ON HOLD | COMMUNITY
End: 2023-02-13 | Stop reason: SDUPTHER

## 2021-02-12 NOTE — TELEPHONE ENCOUNTER
Per Lakisha, patient will need follow up lab drawn next week.   Patient called. Spoke to spouse. F/u lab scheduled for 2/17@0900.

## 2021-02-12 NOTE — PROGRESS NOTES
"Chief Complaint   Patient presents with   • Abdominal Pain     HPI    El Jiang is a  40 y.o. male here for a follow up visit for abdominal pain.  This patient follows with Dr. Magaña, new to me.  He has a history of alcoholic cirrhosis of the liver with esophageal varices (sober since 8/4/2020).  He recently establish care with Dr. Magaña in October following that office visit underwent endoscopic evaluation to reassess esophageal varices.    Reviewed EGD dated 11/18/2020 with grade 2 esophageal varices completely eradicated with banding.  Portal hypertensive gastropathy.  Repeat 1.5 years.    On visit today patient's been experiencing about 3 months of upper abdominal pain located in the epigastric area can radiate to the right upper quadrant.  He had a gallbladder ultrasound performed through his PCP demonstrating gallstones but no biliary ductal dilation.  He was referred to Dr. Vinson and was told he was to high of risk for cholecystectomy and was referred back to Dr. Magaña.  He is having some heartburn symptoms as well.  No nausea, vomiting, or weight loss.  Appetite is good.    No diarrhea, constipation, rectal bleeding.    Currently no issues with jaundice, icterus, abdominal ascites, or lower extremity edema.  He has developed breast tenderness and underwent a mammogram which was negative.    He continues on Lasix 40 mg p.o. daily.  Aldactone 50 mg p.o. daily.  Corgard 40 mg p.o. daily.  Ferrous sulfate once daily.    Past Medical History:   Diagnosis Date   • Acquired stuttering     WIFE STATES HE CAN BE HARD TO UNDERSTAND.. \"[place] ACCENT AND STUTTERS\"   • Bleeding esophageal varices (CMS/HCC)    • Cirrhosis (CMS/HCC)     end-stage   • H/O PAF (paroxysmal atrial fibrillation) (CMS/HCC)    • Hepatosplenomegaly     secondary to cirrhosis   • History of alcohol abuse    • History of anemia    • History of ascites    • History of sepsis    • History of transfusion    • Hyperlipidemia        Past " Surgical History:   Procedure Laterality Date   • ENDOSCOPY N/A 11/18/2020    Procedure: ESOPHAGOGASTRODUODENOSCOPY with esophageal banding x3;  Surgeon: Erik Magaña MD;  Location: [facility] ENDOSCOPY;  Service: Gastroenterology;  Laterality: N/A;  pre - cirrhosis of the liver and varices  post - gastritis, portal hypertensive gastropathy   • ENDOSCOPY W/ BANDING  2020    varices   • HERNIA REPAIR     • PARACENTESIS      900 cc fluid drained       Scheduled Meds:  Outpatient Encounter Medications as of 2/12/2021   Medication Sig Dispense Refill   • ferrous sulfate 324 (65 Fe) MG tablet delayed-release EC tablet Take 324 mg by mouth Daily With Breakfast.     • furosemide (LASIX) 40 MG tablet Take 1 tablet by mouth Daily. 90 tablet 3   • glucosamine-chondroitin 500-400 MG capsule capsule Take  by mouth 3 (Three) Times a Day With Meals.     • nadolol (CORGARD) 40 MG tablet Take 1 tablet by mouth Daily. 90 tablet 3   • spironolactone (ALDACTONE) 50 MG tablet Take 1 tablet by mouth Daily. 90 tablet 3   • traZODone (DESYREL) 50 MG tablet Take 50 mg by mouth Every Night.     • pantoprazole (PROTONIX) 40 MG EC tablet Take 1 tablet by mouth Daily. 30 tablet 5   • sucralfate (Carafate) 1 g tablet Take 1 tablet by mouth 4 (Four) Times a Day. Dissolve in 10 to 15 cc water 120 tablet 0     No facility-administered encounter medications on file as of 2/12/2021.        Continuous Infusions:No current facility-administered medications for this visit.       PRN Meds:.    No Known Allergies    Social History     Socioeconomic History   • Marital status:      Spouse name: Not on file   • Number of children: Not on file   • Years of education: Not on file   • Highest education level: Not on file   Occupational History   • Occupation: /     Comment: [email or web address] in [place]-lost his job     Employer: UNEMPLOYED   Tobacco Use   • Smoking status: Former Smoker     Packs/day: 1.00     Types: Cigarettes   •  Smokeless tobacco: Never Used   • Tobacco comment: quit 6 months ago   Substance and Sexual Activity   • Alcohol use: Not Currently     Comment: H/O alcoholism (quit 6 months ago)   • Drug use: Yes     Frequency: 1.0 times per week     Types: Marijuana   • Sexual activity: Defer   Social History Narrative    ** Merged History Encounter **            Family History   Problem Relation Age of Onset   • Ovarian cancer Mother    • Throat cancer Father    • Malig Hyperthermia Neg Hx        Review of Systems   Constitutional: Negative for activity change, appetite change, fatigue, fever and unexpected weight change.   HENT: Negative for trouble swallowing.    Respiratory: Negative for apnea, cough, choking, chest tightness, shortness of breath and wheezing.    Cardiovascular: Negative for chest pain, palpitations and leg swelling.   Gastrointestinal: Positive for abdominal pain. Negative for abdominal distention, anal bleeding, blood in stool, constipation, diarrhea, nausea, rectal pain and vomiting.       Vitals:    02/12/21 1057   Temp: 98 °F (36.7 °C)       Physical Exam  Constitutional:       Appearance: He is well-developed.   Cardiovascular:      Rate and Rhythm: Normal rate and regular rhythm.      Heart sounds: Normal heart sounds.   Pulmonary:      Effort: Pulmonary effort is normal. No respiratory distress.      Breath sounds: Normal breath sounds. No wheezing.   Abdominal:      General: Bowel sounds are normal. There is no distension.      Palpations: Abdomen is soft. There is no mass.      Tenderness: There is no abdominal tenderness. There is no guarding.      Hernia: No hernia is present.   Skin:     General: Skin is warm and dry.   Neurological:      Mental Status: He is alert and oriented to person, place, and time.   Psychiatric:         Behavior: Behavior normal.     Assessment    Upper abdominal pain  Gallstones  Alcoholic cirrhosis with ascites  Esophageal varices    Plan    Arrange MRCP for further  evaluation of patient's biliary tree.  Decrease Aldactone to 25 mg daily due to breast tenderness.  Follow-up CMP 1 week to assess electrolytes.  Continue Lasix 40 mg daily.  Continue nadolol 40 mg daily to treat esophageal varices.  Labs today to include CBC, CMP, PT/INR, and AFP.  Update meld score based on labs.  Keep previously scheduled follow-up with Dr. Magaña.  Further recommendations pending the aforementioned work-up.

## 2021-02-13 LAB
AFP-TM SERPL-MCNC: 3.1 NG/ML (ref 0–8.3)
INR PPP: 1.39 (ref 0.9–1.1)
PROTHROMBIN TIME: 16.9 SECONDS (ref 11.7–14.2)

## 2021-02-16 NOTE — PROGRESS NOTES
Please inform the patient that his lab work shows elevated liver function but stable.  Has he scheduled for the MRCP?

## 2021-02-17 ENCOUNTER — LAB (OUTPATIENT)
Dept: GASTROENTEROLOGY | Facility: CLINIC | Age: 41
End: 2021-02-17

## 2021-03-05 ENCOUNTER — HOSPITAL ENCOUNTER (OUTPATIENT)
Dept: MRI IMAGING | Facility: HOSPITAL | Age: 41
Discharge: HOME | End: 2021-03-05
Admitting: NURSE PRACTITIONER

## 2021-03-05 DIAGNOSIS — R10.10 PAIN OF UPPER ABDOMEN: ICD-10-CM

## 2021-03-05 DIAGNOSIS — K70.31 ALCOHOLIC CIRRHOSIS OF LIVER WITH ASCITES (HCC): ICD-10-CM

## 2021-03-05 DIAGNOSIS — K80.20 GALLSTONES: ICD-10-CM

## 2021-03-05 PROCEDURE — 0 GADOBENATE DIMEGLUMINE 529 MG/ML SOLUTION: Performed by: NURSE PRACTITIONER

## 2021-03-05 PROCEDURE — A9577 INJ MULTIHANCE: HCPCS | Performed by: NURSE PRACTITIONER

## 2021-03-05 PROCEDURE — 74183 MRI ABD W/O CNTR FLWD CNTR: CPT

## 2021-03-05 RX ADMIN — GADOBENATE DIMEGLUMINE 14 ML: 529 INJECTION, SOLUTION INTRAVENOUS at 20:21

## 2021-03-11 ENCOUNTER — TELEPHONE (OUTPATIENT)
Dept: GASTROENTEROLOGY | Facility: CLINIC | Age: 41
End: 2021-03-11

## 2021-03-11 DIAGNOSIS — K70.31 ALCOHOLIC CIRRHOSIS OF LIVER WITH ASCITES (HCC): Primary | ICD-10-CM

## 2021-03-11 NOTE — TELEPHONE ENCOUNTER
----- Message from Erik Magaña MD sent at 3/10/2021 12:54 PM EST -----  Findings on MRI consistent with clinical picture.  Arrange lab tests including CMP DINESH anti-smooth muscle antibody and direct bilirubin.  Plan on repeat MRCP in 3 months.

## 2021-03-11 NOTE — TELEPHONE ENCOUNTER
Called pt and spoke with pt's wife (ok per DAVIN) and advised of Dr. Burgos note.  She verbalized understanding.     Orders placed for labs, MRCP.    Msg to Dr Magaña to cosign.

## 2021-03-18 RX ORDER — SPIRONOLACTONE 25 MG/1
25 TABLET ORAL DAILY
Qty: 90 TABLET | Refills: 3 | Status: SHIPPED | OUTPATIENT
Start: 2021-03-18 | End: 2021-10-12 | Stop reason: SDUPTHER

## 2021-03-18 RX ORDER — NADOLOL 40 MG/1
40 TABLET ORAL DAILY
Qty: 90 TABLET | Refills: 3 | Status: SHIPPED | OUTPATIENT
Start: 2021-03-18 | End: 2021-09-10 | Stop reason: SDUPTHER

## 2021-03-18 RX ORDER — FUROSEMIDE 40 MG/1
40 TABLET ORAL DAILY
Qty: 90 TABLET | Refills: 3 | Status: SHIPPED | OUTPATIENT
Start: 2021-03-18 | End: 2021-06-10 | Stop reason: SDUPTHER

## 2021-03-18 RX ORDER — SUCRALFATE 1 G/1
1 TABLET ORAL 4 TIMES DAILY
Qty: 120 TABLET | Refills: 0 | Status: SHIPPED | OUTPATIENT
Start: 2021-03-18 | End: 2022-02-22

## 2021-03-18 RX ORDER — PANTOPRAZOLE SODIUM 40 MG/1
40 TABLET, DELAYED RELEASE ORAL DAILY
Qty: 30 TABLET | Refills: 5 | Status: SHIPPED | OUTPATIENT
Start: 2021-03-18 | End: 2021-04-24 | Stop reason: SDUPTHER

## 2021-03-19 ENCOUNTER — LAB (OUTPATIENT)
Dept: GASTROENTEROLOGY | Facility: CLINIC | Age: 41
End: 2021-03-19

## 2021-03-19 LAB
ALBUMIN SERPL-MCNC: 3.9 G/DL (ref 3.5–5.2)
ALBUMIN/GLOB SERPL: 1.2 G/DL
ALP SERPL-CCNC: 94 U/L (ref 39–117)
ALT SERPL-CCNC: 51 U/L (ref 1–41)
AST SERPL-CCNC: 77 U/L (ref 1–40)
BILIRUB SERPL-MCNC: 1.5 MG/DL (ref 0–1.2)
BUN SERPL-MCNC: 9 MG/DL (ref 6–20)
BUN/CREAT SERPL: 12.5 (ref 7–25)
CALCIUM SERPL-MCNC: 9.3 MG/DL (ref 8.6–10.5)
CHLORIDE SERPL-SCNC: 104 MMOL/L (ref 98–107)
CO2 SERPL-SCNC: 25.2 MMOL/L (ref 22–29)
CREAT SERPL-MCNC: 0.72 MG/DL (ref 0.76–1.27)
GLOBULIN SER CALC-MCNC: 3.2 GM/DL
GLUCOSE SERPL-MCNC: 138 MG/DL (ref 65–99)
POTASSIUM SERPL-SCNC: 4.2 MMOL/L (ref 3.5–5.2)
PROT SERPL-MCNC: 7.1 G/DL (ref 6–8.5)
SODIUM SERPL-SCNC: 139 MMOL/L (ref 136–145)

## 2021-03-20 LAB — ACTIN IGG SERPL-ACNC: 14 UNITS (ref 0–19)

## 2021-03-21 LAB — ANA SER QL: NEGATIVE

## 2021-03-31 ENCOUNTER — TELEPHONE (OUTPATIENT)
Dept: GASTROENTEROLOGY | Facility: CLINIC | Age: 41
End: 2021-03-31

## 2021-03-31 NOTE — TELEPHONE ENCOUNTER
----- Message from Erik Magaña MD sent at 3/29/2021  8:04 AM EDT -----  Labs stable otherwise unremarkable.  Continue balanced diet and follow-up as directed

## 2021-03-31 NOTE — TELEPHONE ENCOUNTER
----- Message from LAN Berry sent at 2/18/2021 12:30 PM EST -----  Please inform the patient that his lab work shows elevated liver function test just slightly higher than previous.  Has he scheduled his MRCP?

## 2021-04-01 ENCOUNTER — OFFICE VISIT (OUTPATIENT)
Dept: GASTROENTEROLOGY | Facility: CLINIC | Age: 41
End: 2021-04-01

## 2021-04-01 VITALS — BODY MASS INDEX: 23.79 KG/M2 | WEIGHT: 160.6 LBS | HEIGHT: 69 IN | TEMPERATURE: 97.4 F

## 2021-04-01 DIAGNOSIS — I85.10 SECONDARY ESOPHAGEAL VARICES WITHOUT BLEEDING (HCC): ICD-10-CM

## 2021-04-01 DIAGNOSIS — K70.31 ALCOHOLIC CIRRHOSIS OF LIVER WITH ASCITES (HCC): Primary | ICD-10-CM

## 2021-04-01 PROCEDURE — 99212 OFFICE O/P EST SF 10 MIN: CPT | Performed by: INTERNAL MEDICINE

## 2021-04-01 NOTE — PROGRESS NOTES
"Chief Complaint   Patient presents with   • Follow-up       El Jiang is a  41 y.o. male here for a follow up visit for alcohol cirrhosis.    HPI     Patient 41-year-old male with history of alcoholic cirrhosis here for follow-up.  Patient status post variceal banding doing well with no melena no abdominal pain no fever chills.  Patient had been complaining of some painful gynecomastia which has improved with tapered Aldactone dose.  Patient here for follow-up.    Past Medical History:   Diagnosis Date   • Acquired stuttering     WIFE STATES HE CAN BE HARD TO UNDERSTAND.. \"[place] [employer] STUTTERS\"   • Bleeding esophageal varices (CMS/HCC)    • Cirrhosis (CMS/HCC)     end-stage   • H/O PAF (paroxysmal atrial fibrillation) (CMS/HCC)    • Hepatosplenomegaly     secondary to cirrhosis   • History of alcohol abuse    • History of anemia    • History of ascites    • History of sepsis    • History of transfusion    • Hyperlipidemia          Current Outpatient Medications:   •  ferrous sulfate 324 (65 Fe) MG tablet delayed-release EC tablet, Take 324 mg by mouth Daily With Breakfast., Disp: , Rfl:   •  furosemide (LASIX) 40 MG tablet, Take 1 tablet by mouth Daily., Disp: 90 tablet, Rfl: 3  •  glucosamine-chondroitin 500-400 MG capsule capsule, Take  by mouth 3 (Three) Times a Day With Meals., Disp: , Rfl:   •  nadolol (CORGARD) 40 MG tablet, Take 1 tablet by mouth Daily., Disp: 90 tablet, Rfl: 3  •  pantoprazole (PROTONIX) 40 MG EC tablet, Take 1 tablet by mouth Daily., Disp: 30 tablet, Rfl: 5  •  spironolactone (Aldactone) 25 MG tablet, Take 1 tablet by mouth Daily. (Patient taking differently: Take 25 mg by mouth Daily. Taking .5 tablet daily), Disp: 90 tablet, Rfl: 3  •  traZODone (DESYREL) 50 MG tablet, Take 50 mg by mouth Every Night., Disp: , Rfl:   •  sucralfate (Carafate) 1 g tablet, Take 1 tablet by mouth 4 (Four) Times a Day. Dissolve in 10 to 15 cc water, Disp: 120 tablet, Rfl: 0    No Known " Allergies    Social History     Socioeconomic History   • Marital status:      Spouse name: Not on file   • Number of children: Not on file   • Years of education: Not on file   • Highest education level: Not on file   Tobacco Use   • Smoking status: Former Smoker     Packs/day: 1.00     Types: Cigarettes   • Smokeless tobacco: Never Used   • Tobacco comment: quit 6 months ago   Vaping Use   • Vaping Use: Never used   Substance and Sexual Activity   • Alcohol use: Not Currently     Comment: H/O alcoholism (quit 6 months ago)   • Drug use: Yes     Frequency: 1.0 times per week     Types: Marijuana   • Sexual activity: Defer       Family History   Problem Relation Age of Onset   • Ovarian cancer Mother    • Throat cancer Father    • Malig Hyperthermia Neg Hx        Review of Systems   Constitutional: Positive for fatigue. Negative for activity change, appetite change, chills, diaphoresis, fever and unexpected weight change.   Respiratory: Negative.    Cardiovascular: Negative.    Gastrointestinal: Negative.    Musculoskeletal: Positive for arthralgias. Negative for back pain, gait problem and joint swelling.   Skin: Negative.    Hematological: Negative.        Vitals:    04/01/21 1027   Temp: 97.4 °F (36.3 °C)       Physical Exam  Vitals reviewed.   Constitutional:       Appearance: He is well-developed.   HENT:      Head: Normocephalic and atraumatic.   Eyes:      General: No scleral icterus.     Pupils: Pupils are equal, round, and reactive to light.   Cardiovascular:      Rate and Rhythm: Normal rate and regular rhythm.      Heart sounds: Normal heart sounds.   Pulmonary:      Effort: Pulmonary effort is normal. No respiratory distress.      Breath sounds: Normal breath sounds.   Abdominal:      General: Bowel sounds are normal. There is no distension.      Palpations: Abdomen is soft. There is no mass.      Tenderness: There is no abdominal tenderness.      Hernia: No hernia is present.   Musculoskeletal:          General: Normal range of motion.      Right lower leg: No edema.      Left lower leg: No edema.   Skin:     General: Skin is warm and dry.      Coloration: Skin is not jaundiced.      Findings: No rash.   Neurological:      General: No focal deficit present.      Mental Status: He is alert and oriented to person, place, and time.   Psychiatric:         Behavior: Behavior normal.         Thought Content: Thought content normal.         Results Encounter on 03/11/2021   Component Date Value Ref Range Status   • Bilirubin, Direct 03/19/2021 0.6* 0.0 - 0.3 mg/dL Final   Telephone on 03/11/2021   Component Date Value Ref Range Status   • Glucose 03/19/2021 138* 65 - 99 mg/dL Final   • BUN 03/19/2021 9  6 - 20 mg/dL Final   • Creatinine 03/19/2021 0.72* 0.76 - 1.27 mg/dL Final   • eGFR Non  Am 03/19/2021 119  >60 mL/min/1.73 Final   • eGFR African Am 03/19/2021 144  >60 mL/min/1.73 Final   • BUN/Creatinine Ratio 03/19/2021 12.5  7.0 - 25.0 Final   • Sodium 03/19/2021 139  136 - 145 mmol/L Final   • Potassium 03/19/2021 4.2  3.5 - 5.2 mmol/L Final   • Chloride 03/19/2021 104  98 - 107 mmol/L Final   • Total CO2 03/19/2021 25.2  22.0 - 29.0 mmol/L Final   • Calcium 03/19/2021 9.3  8.6 - 10.5 mg/dL Final   • Total Protein 03/19/2021 7.1  6.0 - 8.5 g/dL Final   • Albumin 03/19/2021 3.90  3.50 - 5.20 g/dL Final   • Globulin 03/19/2021 3.2  gm/dL Final   • A/G Ratio 03/19/2021 1.2  g/dL Final   • Total Bilirubin 03/19/2021 1.5* 0.0 - 1.2 mg/dL Final   • Alkaline Phosphatase 03/19/2021 94  39 - 117 U/L Final   • AST (SGOT) 03/19/2021 77* 1 - 40 U/L Final   • ALT (SGPT) 03/19/2021 51* 1 - 41 U/L Final   • DINESH Direct 03/19/2021 Negative  Negative Final   • Smooth Muscle Ab 03/19/2021 14  0 - 19 Units Final   Results Encounter on 02/19/2021   Component Date Value Ref Range Status   • Glucose 02/17/2021 123* 65 - 99 mg/dL Final   • BUN 02/17/2021 10  6 - 20 mg/dL Final   • Creatinine 02/17/2021 0.65* 0.76 - 1.27 mg/dL  Final   • eGFR Non  Am 02/17/2021 135  >60 mL/min/1.73 Final   • eGFR African Am 02/17/2021 >150  >60 mL/min/1.73 Final   • BUN/Creatinine Ratio 02/17/2021 15.4  7.0 - 25.0 Final   • Sodium 02/17/2021 140  136 - 145 mmol/L Final   • Potassium 02/17/2021 4.2  3.5 - 5.2 mmol/L Final   • Chloride 02/17/2021 105  98 - 107 mmol/L Final   • Total CO2 02/17/2021 26.8  22.0 - 29.0 mmol/L Final   • Calcium 02/17/2021 9.3  8.6 - 10.5 mg/dL Final   • Total Protein 02/17/2021 6.9  6.0 - 8.5 g/dL Final   • Albumin 02/17/2021 3.70  3.50 - 5.20 g/dL Final   • Globulin 02/17/2021 3.2  gm/dL Final   • A/G Ratio 02/17/2021 1.2  g/dL Final   • Total Bilirubin 02/17/2021 2.1* 0.0 - 1.2 mg/dL Final   • Alkaline Phosphatase 02/17/2021 76  39 - 117 U/L Final   • AST (SGOT) 02/17/2021 90* 1 - 40 U/L Final   • ALT (SGPT) 02/17/2021 54* 1 - 41 U/L Final   Office Visit on 02/12/2021   Component Date Value Ref Range Status   • WBC 02/12/2021 5.50  3.40 - 10.80 10*3/mm3 Final   • RBC 02/12/2021 4.49  4.14 - 5.80 10*6/mm3 Final   • Hemoglobin 02/12/2021 14.1  13.0 - 17.7 g/dL Final   • Hematocrit 02/12/2021 41.5  37.5 - 51.0 % Final   • MCV 02/12/2021 92.4  79.0 - 97.0 fL Final   • MCH 02/12/2021 31.4  26.6 - 33.0 pg Final   • MCHC 02/12/2021 34.0  31.5 - 35.7 g/dL Final   • RDW 02/12/2021 14.4  12.3 - 15.4 % Final   • Platelets 02/12/2021 76* 140 - 450 10*3/mm3 Final   • Neutrophil Rel % 02/12/2021 CANCELED   Final-Edited   • Lymphocyte Rel % 02/12/2021 CANCELED   Final-Edited   • Monocyte Rel % 02/12/2021 CANCELED   Final-Edited   • Eosinophil Rel % 02/12/2021 CANCELED   Final-Edited   • Lymphocytes Absolute 02/12/2021 CANCELED   Final-Edited   • Eosinophils Absolute 02/12/2021 CANCELED   Final-Edited   • Basophils Absolute 02/12/2021 CANCELED   Final-Edited   • Glucose 02/12/2021 92  65 - 99 mg/dL Final   • BUN 02/12/2021 12  6 - 20 mg/dL Final   • Creatinine 02/12/2021 0.70* 0.76 - 1.27 mg/dL Final   • eGFR Non  Am 02/12/2021  124  >60 mL/min/1.73 Final   • eGFR African Am 02/12/2021 150  >60 mL/min/1.73 Final   • BUN/Creatinine Ratio 02/12/2021 17.1  7.0 - 25.0 Final   • Sodium 02/12/2021 139  136 - 145 mmol/L Final   • Potassium 02/12/2021 4.5  3.5 - 5.2 mmol/L Final   • Chloride 02/12/2021 104  98 - 107 mmol/L Final   • Total CO2 02/12/2021 24.1  22.0 - 29.0 mmol/L Final   • Calcium 02/12/2021 9.5  8.6 - 10.5 mg/dL Final   • Total Protein 02/12/2021 6.9  6.0 - 8.5 g/dL Final   • Albumin 02/12/2021 4.00  3.50 - 5.20 g/dL Final   • Globulin 02/12/2021 2.9  gm/dL Final   • A/G Ratio 02/12/2021 1.4  g/dL Final   • Total Bilirubin 02/12/2021 2.2* 0.0 - 1.2 mg/dL Final   • Alkaline Phosphatase 02/12/2021 79  39 - 117 U/L Final   • AST (SGOT) 02/12/2021 67* 1 - 40 U/L Final   • ALT (SGPT) 02/12/2021 44* 1 - 41 U/L Final   • INR 02/12/2021 1.39* 0.90 - 1.10 Final   • Protime 02/12/2021 16.9* 11.7 - 14.2 Seconds Final   • AFP Tumor Marker 02/12/2021 3.1  0.0 - 8.3 ng/mL Final   • Neutrophil Rel % 02/12/2021 72.7  42.7 - 76.0 % Final   • Lymphocyte Rel % 02/12/2021 18.2* 19.6 - 45.3 % Final   • Monocyte Rel % 02/12/2021 3.0* 5.0 - 12.0 % Final   • Eosinophil Rel % 02/12/2021 6.1  0.3 - 6.2 % Final   • Neutrophils Absolute 02/12/2021 4.00  1.70 - 7.00 10*3/mm3 Final   • Lymphocytes Absolute 02/12/2021 1.00  0.70 - 3.10 10*3/mm3 Final   • Monocytes Absolute 02/12/2021 0.17  0.10 - 0.90 10*3/mm3 Final   • Eosinophil Abs 02/12/2021 0.34  0.00 - 0.40 10*3/mm3 Final   • Differential Comment 02/12/2021 Comment   Final   • Comment 02/12/2021 Comment   Final   • Plt Comment 02/12/2021 Comment   Final       Diagnoses and all orders for this visit:    1. Alcoholic cirrhosis of liver with ascites (CMS/HCC) (Primary)    2. Secondary esophageal varices without bleeding (CMS/HCC)      Patient 31-year-old male with history of alcoholic cirrhosis abstinent now for over 6 months.  Patient denies any bleeding no nausea vomiting fever chills.  Gynecomastia slowly  improving with lowered Aldactone dose but no edema is noted.  Will DC the Aldactone for now and monitor clinically.  Continue other medications and follow clinically.  Patient to follow-up in 3 months for repeat labs after repeat MRI of the liver and follow clinically for possible future referral for transplant.

## 2021-04-02 ENCOUNTER — BULK ORDERING (OUTPATIENT)
Dept: CASE MANAGEMENT | Facility: OTHER | Age: 41
End: 2021-04-02

## 2021-04-02 DIAGNOSIS — Z23 IMMUNIZATION DUE: ICD-10-CM

## 2021-04-09 ENCOUNTER — LAB (OUTPATIENT)
Dept: OTHER | Facility: HOSPITAL | Age: 41
End: 2021-04-09

## 2021-04-09 DIAGNOSIS — D69.6 THROMBOCYTOPENIA (HCC): ICD-10-CM

## 2021-04-09 LAB
BASOPHILS # BLD AUTO: 0.02 10*3/MM3 (ref 0–0.2)
BASOPHILS NFR BLD AUTO: 0.5 % (ref 0–1.5)
DEPRECATED RDW RBC AUTO: 50.9 FL (ref 37–54)
EOSINOPHIL # BLD AUTO: 0.27 10*3/MM3 (ref 0–0.4)
EOSINOPHIL NFR BLD AUTO: 6.6 % (ref 0.3–6.2)
ERYTHROCYTE [DISTWIDTH] IN BLOOD BY AUTOMATED COUNT: 14.8 % (ref 12.3–15.4)
FERRITIN SERPL-MCNC: 131.9 NG/ML (ref 30–400)
HCT VFR BLD AUTO: 38.8 % (ref 37.5–51)
HGB BLD-MCNC: 13.1 G/DL (ref 13–17.7)
HGB RETIC QN AUTO: 35.4 PG (ref 29.8–36.1)
IMM GRANULOCYTES # BLD AUTO: 0.01 10*3/MM3 (ref 0–0.05)
IMM GRANULOCYTES NFR BLD AUTO: 0.2 % (ref 0–0.5)
IMM RETICS NFR: 8.3 % (ref 3–15.8)
IRON 24H UR-MRATE: 65 MCG/DL (ref 59–158)
IRON SATN MFR SERPL: 20 % (ref 20–50)
LYMPHOCYTES # BLD AUTO: 1 10*3/MM3 (ref 0.7–3.1)
LYMPHOCYTES NFR BLD AUTO: 24.4 % (ref 19.6–45.3)
MCH RBC QN AUTO: 31.6 PG (ref 26.6–33)
MCHC RBC AUTO-ENTMCNC: 33.8 G/DL (ref 31.5–35.7)
MCV RBC AUTO: 93.5 FL (ref 79–97)
MONOCYTES # BLD AUTO: 0.36 10*3/MM3 (ref 0.1–0.9)
MONOCYTES NFR BLD AUTO: 8.8 % (ref 5–12)
NEUTROPHILS NFR BLD AUTO: 2.44 10*3/MM3 (ref 1.7–7)
NEUTROPHILS NFR BLD AUTO: 59.5 % (ref 42.7–76)
NRBC BLD AUTO-RTO: 0 /100 WBC (ref 0–0.2)
PLAT MORPH BLD: NORMAL
PLATELET # BLD AUTO: 74 10*3/MM3 (ref 140–450)
PMV BLD AUTO: 10.4 FL (ref 6–12)
RBC # BLD AUTO: 4.15 10*6/MM3 (ref 4.14–5.8)
RBC MORPH BLD: NORMAL
RETICS # AUTO: 0.05 10*6/MM3 (ref 0.02–0.13)
RETICS/RBC NFR AUTO: 1.27 % (ref 0.7–1.9)
TIBC SERPL-MCNC: 331 MCG/DL (ref 298–536)
TRANSFERRIN SERPL-MCNC: 222 MG/DL (ref 200–360)
WBC # BLD AUTO: 4.1 10*3/MM3 (ref 3.4–10.8)
WBC MORPH BLD: NORMAL

## 2021-04-09 PROCEDURE — 36415 COLL VENOUS BLD VENIPUNCTURE: CPT

## 2021-04-09 PROCEDURE — 85025 COMPLETE CBC W/AUTO DIFF WBC: CPT | Performed by: INTERNAL MEDICINE

## 2021-04-09 PROCEDURE — 85046 RETICYTE/HGB CONCENTRATE: CPT | Performed by: INTERNAL MEDICINE

## 2021-04-09 PROCEDURE — 82728 ASSAY OF FERRITIN: CPT | Performed by: INTERNAL MEDICINE

## 2021-04-09 PROCEDURE — 82747 ASSAY OF FOLIC ACID RBC: CPT | Performed by: INTERNAL MEDICINE

## 2021-04-09 PROCEDURE — 85014 HEMATOCRIT: CPT | Performed by: INTERNAL MEDICINE

## 2021-04-09 PROCEDURE — 83540 ASSAY OF IRON: CPT | Performed by: INTERNAL MEDICINE

## 2021-04-09 PROCEDURE — 84466 ASSAY OF TRANSFERRIN: CPT | Performed by: INTERNAL MEDICINE

## 2021-04-09 PROCEDURE — 85007 BL SMEAR W/DIFF WBC COUNT: CPT | Performed by: INTERNAL MEDICINE

## 2021-04-12 LAB
FOLATE BLD-MCNC: 550 NG/ML
FOLATE RBC-MCNC: 1429 NG/ML
HCT VFR BLD AUTO: 38.5 % (ref 37.5–51)

## 2021-04-16 ENCOUNTER — APPOINTMENT (OUTPATIENT)
Dept: OTHER | Facility: HOSPITAL | Age: 41
End: 2021-04-16

## 2021-04-16 ENCOUNTER — OFFICE VISIT (OUTPATIENT)
Dept: ONCOLOGY | Facility: CLINIC | Age: 41
End: 2021-04-16

## 2021-04-16 VITALS
HEART RATE: 75 BPM | TEMPERATURE: 97.4 F | OXYGEN SATURATION: 99 % | HEIGHT: 69 IN | WEIGHT: 159.8 LBS | SYSTOLIC BLOOD PRESSURE: 110 MMHG | BODY MASS INDEX: 23.67 KG/M2 | DIASTOLIC BLOOD PRESSURE: 69 MMHG | RESPIRATION RATE: 18 BRPM

## 2021-04-16 DIAGNOSIS — D64.9 ANEMIA, UNSPECIFIED TYPE: ICD-10-CM

## 2021-04-16 DIAGNOSIS — D69.6 THROMBOCYTOPENIA (HCC): Primary | ICD-10-CM

## 2021-04-16 PROCEDURE — 99214 OFFICE O/P EST MOD 30 MIN: CPT | Performed by: INTERNAL MEDICINE

## 2021-04-16 NOTE — PROGRESS NOTES
"    .     REASON FOR FOLLOWUP : Thrombocytopenia, iron deficiency anemia, alcoholic cirrhosis    HISTORY OF PRESENT ILLNESS:  The patient is a 41 y.o. year old male  who is here for follow-up with the above-mentioned history.    Denies bleeding, CP, SOA.  No new health issues.        Past Medical History:   Diagnosis Date   • Acquired stuttering     WIFE STATES HE CAN BE HARD TO UNDERSTAND.. \"[place] ACCENT AND STUTTERS\"   • Bleeding esophageal varices (CMS/HCC)    • Cirrhosis (CMS/HCC)     end-stage   • H/O PAF (paroxysmal atrial fibrillation) (CMS/HCC)    • Hepatosplenomegaly     secondary to cirrhosis   • History of alcohol abuse    • History of anemia    • History of ascites    • History of sepsis    • History of transfusion    • Hyperlipidemia      Past Surgical History:   Procedure Laterality Date   • ENDOSCOPY N/A 11/18/2020    Procedure: ESOPHAGOGASTRODUODENOSCOPY with esophageal banding x3;  Surgeon: Erik Magaña MD;  Location: [facility];  Service: Gastroenterology;  Laterality: N/A;  pre - cirrhosis of the liver and varices  post - gastritis, portal hypertensive gastropathy   • ENDOSCOPY W/ BANDING  2020    varices   • HERNIA REPAIR     • PARACENTESIS      900 cc fluid drained       MEDICATIONS    Current Outpatient Medications:   •  ferrous sulfate 324 (65 Fe) MG tablet delayed-release EC tablet, Take 324 mg by mouth Daily With Breakfast., Disp: , Rfl:   •  furosemide (LASIX) 40 MG tablet, Take 1 tablet by mouth Daily., Disp: 90 tablet, Rfl: 3  •  glucosamine-chondroitin 500-400 MG capsule capsule, Take  by mouth 3 (Three) Times a Day With Meals., Disp: , Rfl:   •  nadolol (CORGARD) 40 MG tablet, Take 1 tablet by mouth Daily., Disp: 90 tablet, Rfl: 3  •  pantoprazole (PROTONIX) 40 MG EC tablet, Take 1 tablet by mouth Daily., Disp: 30 tablet, Rfl: 5  •  spironolactone (Aldactone) 25 MG tablet, Take 1 tablet by mouth Daily. (Patient taking differently: Take 25 mg by mouth Daily. Taking .5 " "tablet daily), Disp: 90 tablet, Rfl: 3  •  sucralfate (Carafate) 1 g tablet, Take 1 tablet by mouth 4 (Four) Times a Day. Dissolve in 10 to 15 cc water, Disp: 120 tablet, Rfl: 0  •  traZODone (DESYREL) 50 MG tablet, Take 50 mg by mouth Every Night., Disp: , Rfl:     ALLERGIES:   No Known Allergies    SOCIAL HISTORY:       Social History     Socioeconomic History   • Marital status:      Spouse name: Not on file   • Number of children: Not on file   • Years of education: Not on file   • Highest education level: Not on file   Tobacco Use   • Smoking status: Former Smoker     Packs/day: 1.00     Types: Cigarettes   • Smokeless tobacco: Never Used   • Tobacco comment: quit 6 months ago   Vaping Use   • Vaping Use: Never used   Substance and Sexual Activity   • Alcohol use: Not Currently     Comment: H/O alcoholism (quit 6 months ago)   • Drug use: Yes     Frequency: 1.0 times per week     Types: Marijuana   • Sexual activity: Defer         FAMILY HISTORY:  Family History   Problem Relation Age of Onset   • Ovarian cancer Mother    • Throat cancer Father    • Malig Hyperthermia Neg Hx        REVIEW OF SYSTEMS:  Review of Systems   Constitutional: Negative for activity change.   HENT: Negative for nosebleeds and trouble swallowing.    Respiratory: Negative for shortness of breath and wheezing.    Cardiovascular: Negative for chest pain and palpitations.   Gastrointestinal: Negative for constipation, diarrhea and nausea.   Genitourinary: Negative for dysuria and hematuria.   Musculoskeletal: Negative for arthralgias and myalgias.   Neurological: Negative for seizures and syncope.   Hematological: Negative for adenopathy. Does not bruise/bleed easily.   Psychiatric/Behavioral: Negative for confusion.              Vitals:    04/16/21 1448   BP: 110/69   Pulse: 75   Resp: 18   Temp: 97.4 °F (36.3 °C)   TempSrc: Temporal   SpO2: 99%   Weight: 72.5 kg (159 lb 12.8 oz)   Height: 175.3 cm (69.02\")   PainSc: 0-No pain "     Current Status 4/16/2021   ECOG score 0      PHYSICAL EXAM:        CONSTITUTIONAL:  Vital signs reviewed.  No distress, looks comfortable.  EYES:  Conjunctiva and lids unremarkable.  PERRLA  EARS,NOSE,MOUTH,THROAT:  Ears and nose appear unremarkable.  Lips, teeth, gums appear unremarkable.  RESPIRATORY:  Normal respiratory effort.  Lungs clear to auscultation bilaterally.  CARDIOVASCULAR:  Normal S1, S2.  No murmurs rubs or gallops.  No significant lower extremity edema.  GASTROINTESTINAL: Abdomen appears unremarkable.  Nontender.  No hepatomegaly.  No splenomegaly.  LYMPHATIC:  No cervical, supraclavicular, axillary lymphadenopathy.  SKIN:  Warm.  No rashes.  PSYCHIATRIC:  Normal judgment and insight.  Normal mood and affect.       RECENT LABS:        WBC   Date Value Ref Range Status   04/09/2021 4.10 3.40 - 10.80 10*3/mm3 Final   02/12/2021 5.50 3.40 - 10.80 10*3/mm3 Final   12/11/2020 4.68 3.40 - 10.80 10*3/mm3 Final   10/27/2020 5.65 3.40 - 10.80 10*3/mm3 Final   10/09/2020 6.22 3.40 - 10.80 10*3/mm3 Final     Hemoglobin   Date Value Ref Range Status   04/09/2021 13.1 13.0 - 17.7 g/dL Final   02/12/2021 14.1 13.0 - 17.7 g/dL Final   12/11/2020 13.1 13.0 - 17.7 g/dL Final   10/27/2020 11.7 (L) 13.0 - 17.7 g/dL Final   10/09/2020 11.3 (L) 13.0 - 17.7 g/dL Final     Platelets   Date Value Ref Range Status   04/09/2021 74 (L) 140 - 450 10*3/mm3 Final   02/12/2021 76 (L) 140 - 450 10*3/mm3 Final   12/11/2020 81 (L) 140 - 450 10*3/mm3 Final   12/11/2020 81 (L) 140 - 450 10*3/mm3 Final   10/27/2020 89 (L) 140 - 450 10*3/mm3 Final   10/09/2020 92 (L) 140 - 450 10*3/mm3 Final   10/09/2020 95 (L) 140 - 450 10*3/mm3 Final       Assessment/Plan   Thrombocytopenia (CMS/HCC)  - Ferritin  - Iron Profile  - Retic With IRF & RET-He  - CBC & Differential    Anemia, unspecified type  - Ferritin  - Iron Profile  - Retic With IRF & RET-He  - CBC & Differential    El Jiang     *Thrombocytopenia, due to alcoholic  cirrhosis  · Unfortunately, there is no treatment to improve thrombocytopenia due to alcoholic cirrhosis.  I encouraged continued alcohol cessation, to try to avoid worsening of thrombocytopenia.  PLT 74    *Iron deficiency anemia.  · On 9/10/2020,  Hb 9.1, Ferritin 14.  9% saturation.  · Oral iron 1 tablet daily started 9/10/2020.  · On 10/9/2020, Hb 11.3 (improved with oral iron).  · On 12/11/2020, ferritin 102, 34% saturation, Hb 13.1  · Continue oral iron daily  · (Ongoing intermittent mild blood in stools)  · RBC folate checked and normal  · 4/16/2021: Ferritin 132, 20% saturation, Hb 13.1  Continue oral iron daily    *Source of iron deficiency  · Grade 2 esophageal varices on EGD, Dr. Magaña, 11/18/2020.  Banded.  Moderate portal hypertensive gastropathy.    *Alcoholic cirrhosis  · He was told previously he needs a liver transplant.  · Sometime around August 2020, while in [place], hospitalization with banding of esophageal varices and 0.9 L ascitic fluid removed.  · Dr. Denis Magaña is following.  He is following clinically for possible future referral for transplant.    *Alcoholism  · Still no alcohol since 8/4/2020  Remains abstinent from alcohol.  I congratulated him on his continued success.    *Paroxysmal atrial fibrillation.    · Evaluated by Dr. Rupert Leroy on 10/13/2020.  Dr. Leroy stated although the patient's mother-in-law who is a retired nurse understands the patient had A. fib, Dr. Leroy states he could not to find anywhere in the records documentation of A. 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  1 week prior: Iron labs   · I do not expect his PLT count to improve.    Send copy this to  Dr. Adrian Ibrahim, PCP

## 2021-04-26 RX ORDER — PANTOPRAZOLE SODIUM 40 MG/1
40 TABLET, DELAYED RELEASE ORAL DAILY
Qty: 90 TABLET | Refills: 3 | Status: SHIPPED | OUTPATIENT
Start: 2021-04-26 | End: 2021-10-12 | Stop reason: SDUPTHER

## 2021-05-07 ENCOUNTER — HOSPITAL ENCOUNTER (OUTPATIENT)
Dept: MRI IMAGING | Facility: HOSPITAL | Age: 41
Discharge: HOME | End: 2021-05-07
Admitting: INTERNAL MEDICINE

## 2021-05-07 DIAGNOSIS — K70.31 ALCOHOLIC CIRRHOSIS OF LIVER WITH ASCITES (HCC): ICD-10-CM

## 2021-05-07 PROCEDURE — 74183 MRI ABD W/O CNTR FLWD CNTR: CPT

## 2021-05-07 PROCEDURE — 0 GADOBENATE DIMEGLUMINE 529 MG/ML SOLUTION: Performed by: INTERNAL MEDICINE

## 2021-05-07 PROCEDURE — A9577 INJ MULTIHANCE: HCPCS | Performed by: INTERNAL MEDICINE

## 2021-05-07 RX ADMIN — GADOBENATE DIMEGLUMINE 15 ML: 529 INJECTION, SOLUTION INTRAVENOUS at 10:06

## 2021-05-12 ENCOUNTER — TELEPHONE (OUTPATIENT)
Dept: GASTROENTEROLOGY | Facility: CLINIC | Age: 41
End: 2021-05-12

## 2021-06-10 ENCOUNTER — TELEPHONE (OUTPATIENT)
Dept: GASTROENTEROLOGY | Facility: CLINIC | Age: 41
End: 2021-06-10

## 2021-06-10 NOTE — TELEPHONE ENCOUNTER
----- Message from Erik Magaña MD sent at 5/19/2021  2:07 PM EDT -----  Biliary tree with no abnormal duct system.  Continue current therapy and follow-up as directed.

## 2021-06-11 RX ORDER — FUROSEMIDE 40 MG/1
40 TABLET ORAL DAILY
Qty: 90 TABLET | Refills: 3 | Status: SHIPPED | OUTPATIENT
Start: 2021-06-11 | End: 2021-09-10 | Stop reason: SDUPTHER

## 2021-07-01 ENCOUNTER — TELEPHONE (OUTPATIENT)
Dept: GASTROENTEROLOGY | Facility: CLINIC | Age: 41
End: 2021-07-01

## 2021-07-01 NOTE — TELEPHONE ENCOUNTER
----- Message from Krista Low sent at 7/1/2021  8:29 AM EDT -----  Regarding: Results  Contact: [phone]  Pt had to cancel appt. Today due [place] at airport, would like a call to go over results but could you hold off calling til after 3 PM

## 2021-09-02 ENCOUNTER — OFFICE VISIT (OUTPATIENT)
Dept: GASTROENTEROLOGY | Facility: CLINIC | Age: 41
End: 2021-09-02

## 2021-09-02 VITALS — TEMPERATURE: 97.3 F | HEIGHT: 69 IN | WEIGHT: 155.6 LBS | BODY MASS INDEX: 23.05 KG/M2

## 2021-09-02 DIAGNOSIS — I85.10 SECONDARY ESOPHAGEAL VARICES WITHOUT BLEEDING (HCC): ICD-10-CM

## 2021-09-02 DIAGNOSIS — K70.30 ALCOHOLIC CIRRHOSIS OF LIVER WITHOUT ASCITES (HCC): Primary | ICD-10-CM

## 2021-09-02 PROCEDURE — 99213 OFFICE O/P EST LOW 20 MIN: CPT | Performed by: NURSE PRACTITIONER

## 2021-09-02 NOTE — PROGRESS NOTES
"Chief Complaint   Patient presents with   • Cirrhosis     HPI    El Jiang is a  41 y.o. male here for a follow up visit for cirrhosis.    This patient follows with Dr. Magaña and myself.    Cirrhosis secondary to alcoholism.  Patient maintaining sobriety now for 1 year.  Denies abdominal pain, ascites, or bilateral lower extremity edema on visit today.  He is compliant with his diuretic regimen.  He takes a beta-blocker for esophageal varices.  Occasionally gets abdominal cramps 2 days out of the week mild in nature short duration.  No diarrhea, constipation, rectal bleeding.    Recent MRI normal.    He needs follow-up labs today to reassess liver function and calculate meld score.      Past Medical History:   Diagnosis Date   • Acquired stuttering     WIFE STATES HE CAN BE HARD TO UNDERSTAND.. \"[place] [employer] STUTTERS\"   • Bleeding esophageal varices (CMS/HCC)    • Cirrhosis (CMS/HCC)     end-stage   • H/O PAF (paroxysmal atrial fibrillation) (CMS/HCC)    • Hepatosplenomegaly     secondary to cirrhosis   • History of alcohol abuse    • History of anemia    • History of ascites    • History of sepsis    • History of transfusion    • Hyperlipidemia        Past Surgical History:   Procedure Laterality Date   • ENDOSCOPY N/A 11/18/2020    Procedure: ESOPHAGOGASTRODUODENOSCOPY with esophageal banding x3;  Surgeon: Erik Magaña MD;  Location: [facility];  Service: Gastroenterology;  Laterality: N/A;  pre - cirrhosis of the liver and varices  post - gastritis, portal hypertensive gastropathy   • ENDOSCOPY W/ BANDING  2020    varices   • HERNIA REPAIR     • PARACENTESIS      900 cc fluid drained       Scheduled Meds:     Continuous Infusions: No current facility-administered medications for this visit.      PRN Meds:     No Known Allergies    Social History     Socioeconomic History   • Marital status:      Spouse name: Not on file   • Number of children: Not on file   • Years of education: " Not on file   • Highest education level: Not on file   Tobacco Use   • Smoking status: Former Smoker     Packs/day: 1.00     Types: Cigarettes   • Smokeless tobacco: Never Used   • Tobacco comment: quit 6 months ago   Vaping Use   • Vaping Use: Never used   Substance and Sexual Activity   • Alcohol use: Not Currently     Comment: H/O alcoholism (quit 6 months ago)   • Drug use: Yes     Frequency: 1.0 times per week     Types: Marijuana   • Sexual activity: Defer       Family History   Problem Relation Age of Onset   • Ovarian cancer Mother    • Throat cancer Father    • Malig Hyperthermia Neg Hx        Review of Systems   Constitutional: Negative for activity change, appetite change, fatigue, fever and unexpected weight change.   HENT: Negative for trouble swallowing.    Respiratory: Negative for apnea, cough, choking, chest tightness, shortness of breath and wheezing.    Cardiovascular: Negative for chest pain, palpitations and leg swelling.   Gastrointestinal: Negative for abdominal distention, abdominal pain, anal bleeding, blood in stool, constipation, diarrhea, nausea, rectal pain and vomiting.       Vitals:    09/02/21 0810   Temp: 97.3 °F (36.3 °C)     Physical Exam  Constitutional:       Appearance: She is well-developed.   Abdominal:      General: Bowel sounds are normal. There is no distension.      Palpations: Abdomen is soft. There is no mass.      Tenderness: There is no abdominal tenderness. There is no guarding.      Hernia: No hernia is present.   Skin:     General: Skin is warm and dry.      Capillary Refill: Capillary refill takes less than 2 seconds.   Neurological:      Mental Status: She is alert and oriented to person, place, and time.   Psychiatric:         Behavior: Behavior normal.     Assessment    1. Alcoholic cirrhosis of liver without ascites (CMS/HCC)    - CBC & Differential  - Comprehensive Metabolic Panel  - Protime-INR  - AFP Tumor Marker  - Iron and TIBC  - Vitamin B12 and  Folate    2.  Esophageal varices      Plan    Stable cirrhosis.  Continue EtOH abstinence.  Continue diuretics Lasix and Aldactone.  Continue PPI therapy.  Continue Corgard.  Discussed the benefits of FD guard for occasional cramps in combination with antireflux diet.  Labs today to include CBC, CMP, PT/INR, AFP, iron stores, B12 and folate.  Update meld score based on labs.  Return to clinic 3 months.           LAN Berry  [facility]  [address] [place] [address]  Office: [phone]

## 2021-09-03 LAB
AFP-TM SERPL-MCNC: 1.8 NG/ML (ref 0–8.3)
ALBUMIN SERPL-MCNC: 4.2 G/DL (ref 3.5–5.2)
ALBUMIN/GLOB SERPL: 1.8 G/DL
ALP SERPL-CCNC: 127 U/L (ref 39–117)
ALT SERPL-CCNC: 41 U/L (ref 1–41)
AST SERPL-CCNC: 60 U/L (ref 1–40)
BASOPHILS # BLD AUTO: 0.03 10*3/MM3 (ref 0–0.2)
BASOPHILS NFR BLD AUTO: 0.7 % (ref 0–1.5)
BILIRUB SERPL-MCNC: 1.9 MG/DL (ref 0–1.2)
BUN SERPL-MCNC: 10 MG/DL (ref 6–20)
BUN/CREAT SERPL: 16.7 (ref 7–25)
CALCIUM SERPL-MCNC: 9.1 MG/DL (ref 8.6–10.5)
CHLORIDE SERPL-SCNC: 108 MMOL/L (ref 98–107)
CO2 SERPL-SCNC: 25.6 MMOL/L (ref 22–29)
CREAT SERPL-MCNC: 0.6 MG/DL (ref 0.76–1.27)
EOSINOPHIL # BLD AUTO: 0.32 10*3/MM3 (ref 0–0.4)
EOSINOPHIL NFR BLD AUTO: 7.8 % (ref 0.3–6.2)
ERYTHROCYTE [DISTWIDTH] IN BLOOD BY AUTOMATED COUNT: 13.9 % (ref 12.3–15.4)
FOLATE SERPL-MCNC: 15 NG/ML (ref 4.78–24.2)
GLOBULIN SER CALC-MCNC: 2.3 GM/DL
GLUCOSE SERPL-MCNC: 186 MG/DL (ref 65–99)
HCT VFR BLD AUTO: 40.7 % (ref 37.5–51)
HGB BLD-MCNC: 13.8 G/DL (ref 13–17.7)
IMM GRANULOCYTES # BLD AUTO: 0.01 10*3/MM3 (ref 0–0.05)
IMM GRANULOCYTES NFR BLD AUTO: 0.2 % (ref 0–0.5)
INR PPP: 1.53 (ref 0.9–1.1)
IRON SATN MFR SERPL: 33 % (ref 20–50)
IRON SERPL-MCNC: 106 MCG/DL (ref 59–158)
LYMPHOCYTES # BLD AUTO: 0.77 10*3/MM3 (ref 0.7–3.1)
LYMPHOCYTES NFR BLD AUTO: 18.8 % (ref 19.6–45.3)
MCH RBC QN AUTO: 31.5 PG (ref 26.6–33)
MCHC RBC AUTO-ENTMCNC: 33.9 G/DL (ref 31.5–35.7)
MCV RBC AUTO: 92.9 FL (ref 79–97)
MONOCYTES # BLD AUTO: 0.3 10*3/MM3 (ref 0.1–0.9)
MONOCYTES NFR BLD AUTO: 7.3 % (ref 5–12)
NEUTROPHILS # BLD AUTO: 2.67 10*3/MM3 (ref 1.7–7)
NEUTROPHILS NFR BLD AUTO: 65.2 % (ref 42.7–76)
NRBC BLD AUTO-RTO: 0 /100 WBC (ref 0–0.2)
PLATELET # BLD AUTO: 73 10*3/MM3 (ref 140–450)
POTASSIUM SERPL-SCNC: 4.4 MMOL/L (ref 3.5–5.2)
PROT SERPL-MCNC: 6.5 G/DL (ref 6–8.5)
PROTHROMBIN TIME: 18.2 SECONDS (ref 11.7–14.2)
RBC # BLD AUTO: 4.38 10*6/MM3 (ref 4.14–5.8)
SODIUM SERPL-SCNC: 143 MMOL/L (ref 136–145)
TIBC SERPL-MCNC: 325 MCG/DL
UIBC SERPL-MCNC: 219 MCG/DL (ref 112–346)
VIT B12 SERPL-MCNC: >2000 PG/ML (ref 211–946)
WBC # BLD AUTO: 4.1 10*3/MM3 (ref 3.4–10.8)

## 2021-09-03 NOTE — PROGRESS NOTES
I saw Placido in follow-up.  He seems to be doing pretty well.  Calculated meld score 14 however. Are you considering sending him to the transplant group?  He has been sober for 1 year now.

## 2021-09-09 ENCOUNTER — TELEPHONE (OUTPATIENT)
Dept: GASTROENTEROLOGY | Facility: CLINIC | Age: 41
End: 2021-09-09

## 2021-09-09 NOTE — TELEPHONE ENCOUNTER
----- Message from LAN Berry sent at 9/9/2021  1:05 PM EDT -----  Regarding: Needs referral to [facility] transplant group at [facility]  Please inform the patient that his lab work shows an elevated meld score of around 14.  We want him to be evaluated with [facility] transplant group at [facility].  Please place referral if he is agreeable and get him set up for an appointment.  ----- Message -----  From: Erik Magaña MD  Sent: 9/8/2021   9:23 AM EDT  To: LAN Berry    Yes, please put in the referral  ----- Message -----  From: Lakisha Garcia APRN  Sent: 9/3/2021  12:37 PM EDT  To: Erik Magaña MD    I saw Placido in follow-up.  He seems to be doing pretty well.  Calculated meld score 14 however. Are you considering sending him to the transplant group?  He has been sober for 1 year now.

## 2021-09-09 NOTE — TELEPHONE ENCOUNTER
Called pt and spoke with wife (ok per DAVIN) and advised of Lakisha VALENZUELA's note.  She verbalized understanding.     Pt's wife says pt will be agreeable to see [facility].    Msg sent to Мария BLANTON to get set up.

## 2021-09-13 RX ORDER — NADOLOL 40 MG/1
40 TABLET ORAL DAILY
Qty: 90 TABLET | Refills: 3 | Status: SHIPPED | OUTPATIENT
Start: 2021-09-13 | End: 2021-11-05 | Stop reason: SDUPTHER

## 2021-09-13 RX ORDER — FUROSEMIDE 40 MG/1
40 TABLET ORAL DAILY
Qty: 90 TABLET | Refills: 3 | Status: SHIPPED | OUTPATIENT
Start: 2021-09-13 | End: 2022-02-22 | Stop reason: SDUPTHER

## 2021-10-01 ENCOUNTER — LAB (OUTPATIENT)
Dept: OTHER | Facility: HOSPITAL | Age: 41
End: 2021-10-01

## 2021-10-01 DIAGNOSIS — D69.6 THROMBOCYTOPENIA (HCC): ICD-10-CM

## 2021-10-01 DIAGNOSIS — D64.9 ANEMIA, UNSPECIFIED TYPE: ICD-10-CM

## 2021-10-01 LAB
BASOPHILS # BLD AUTO: 0.02 10*3/MM3 (ref 0–0.2)
BASOPHILS NFR BLD AUTO: 0.6 % (ref 0–1.5)
DEPRECATED RDW RBC AUTO: 51.1 FL (ref 37–54)
EOSINOPHIL # BLD AUTO: 0.31 10*3/MM3 (ref 0–0.4)
EOSINOPHIL NFR BLD AUTO: 8.8 % (ref 0.3–6.2)
ERYTHROCYTE [DISTWIDTH] IN BLOOD BY AUTOMATED COUNT: 14.8 % (ref 12.3–15.4)
FERRITIN SERPL-MCNC: 223.2 NG/ML (ref 30–400)
HCT VFR BLD AUTO: 42.1 % (ref 37.5–51)
HGB BLD-MCNC: 13.9 G/DL (ref 13–17.7)
HGB RETIC QN AUTO: 35.5 PG (ref 29.8–36.1)
IMM GRANULOCYTES # BLD AUTO: 0.01 10*3/MM3 (ref 0–0.05)
IMM GRANULOCYTES NFR BLD AUTO: 0.3 % (ref 0–0.5)
IMM RETICS NFR: 8.9 % (ref 3–15.8)
IRON 24H UR-MRATE: 55 MCG/DL (ref 59–158)
IRON SATN MFR SERPL: 18 % (ref 20–50)
LYMPHOCYTES # BLD AUTO: 0.81 10*3/MM3 (ref 0.7–3.1)
LYMPHOCYTES NFR BLD AUTO: 22.9 % (ref 19.6–45.3)
MCH RBC QN AUTO: 31 PG (ref 26.6–33)
MCHC RBC AUTO-ENTMCNC: 33 G/DL (ref 31.5–35.7)
MCV RBC AUTO: 93.8 FL (ref 79–97)
MONOCYTES # BLD AUTO: 0.33 10*3/MM3 (ref 0.1–0.9)
MONOCYTES NFR BLD AUTO: 9.3 % (ref 5–12)
NEUTROPHILS NFR BLD AUTO: 2.06 10*3/MM3 (ref 1.7–7)
NEUTROPHILS NFR BLD AUTO: 58.1 % (ref 42.7–76)
NRBC BLD AUTO-RTO: 0 /100 WBC (ref 0–0.2)
PLAT MORPH BLD: NORMAL
PLATELET # BLD AUTO: 57 10*3/MM3 (ref 140–450)
PMV BLD AUTO: 12.2 FL (ref 6–12)
RBC # BLD AUTO: 4.49 10*6/MM3 (ref 4.14–5.8)
RBC MORPH BLD: NORMAL
RETICS # AUTO: 0.06 10*6/MM3 (ref 0.02–0.13)
RETICS/RBC NFR AUTO: 1.32 % (ref 0.7–1.9)
TIBC SERPL-MCNC: 310 MCG/DL (ref 298–536)
TRANSFERRIN SERPL-MCNC: 208 MG/DL (ref 200–360)
WBC # BLD AUTO: 3.54 10*3/MM3 (ref 3.4–10.8)
WBC MORPH BLD: NORMAL

## 2021-10-01 PROCEDURE — 84466 ASSAY OF TRANSFERRIN: CPT | Performed by: INTERNAL MEDICINE

## 2021-10-01 PROCEDURE — 36415 COLL VENOUS BLD VENIPUNCTURE: CPT

## 2021-10-01 PROCEDURE — 85025 COMPLETE CBC W/AUTO DIFF WBC: CPT | Performed by: INTERNAL MEDICINE

## 2021-10-01 PROCEDURE — 85046 RETICYTE/HGB CONCENTRATE: CPT | Performed by: INTERNAL MEDICINE

## 2021-10-01 PROCEDURE — 83540 ASSAY OF IRON: CPT | Performed by: INTERNAL MEDICINE

## 2021-10-01 PROCEDURE — 85007 BL SMEAR W/DIFF WBC COUNT: CPT | Performed by: INTERNAL MEDICINE

## 2021-10-01 PROCEDURE — 82728 ASSAY OF FERRITIN: CPT | Performed by: INTERNAL MEDICINE

## 2021-10-06 ENCOUNTER — LAB (OUTPATIENT)
Dept: LAB | Facility: HOSPITAL | Age: 41
End: 2021-10-06

## 2021-10-06 ENCOUNTER — TRANSCRIBE ORDERS (OUTPATIENT)
Dept: ADMINISTRATIVE | Facility: HOSPITAL | Age: 41
End: 2021-10-06

## 2021-10-06 DIAGNOSIS — K70.31 ALCOHOLIC CIRRHOSIS OF LIVER WITH ASCITES (HCC): ICD-10-CM

## 2021-10-06 DIAGNOSIS — K70.31 ALCOHOLIC CIRRHOSIS OF LIVER WITH ASCITES (HCC): Primary | ICD-10-CM

## 2021-10-06 LAB
ALBUMIN SERPL-MCNC: 3.7 G/DL (ref 3.5–5.2)
ALBUMIN/GLOB SERPL: 1.3 G/DL
ALP SERPL-CCNC: 117 U/L (ref 39–117)
ALT SERPL W P-5'-P-CCNC: 52 U/L (ref 1–41)
ANION GAP SERPL CALCULATED.3IONS-SCNC: 9 MMOL/L (ref 5–15)
AST SERPL-CCNC: 72 U/L (ref 1–40)
BILIRUB SERPL-MCNC: 1.8 MG/DL (ref 0–1.2)
BUN SERPL-MCNC: 11 MG/DL (ref 6–20)
BUN/CREAT SERPL: 16.7 (ref 7–25)
CALCIUM SPEC-SCNC: 9.3 MG/DL (ref 8.6–10.5)
CHLORIDE SERPL-SCNC: 107 MMOL/L (ref 98–107)
CO2 SERPL-SCNC: 25 MMOL/L (ref 22–29)
CREAT SERPL-MCNC: 0.66 MG/DL (ref 0.76–1.27)
GFR SERPL CREATININE-BSD FRML MDRD: 132 ML/MIN/1.73
GLOBULIN UR ELPH-MCNC: 2.9 GM/DL
GLUCOSE SERPL-MCNC: 96 MG/DL (ref 65–99)
MAGNESIUM SERPL-MCNC: 1.9 MG/DL (ref 1.6–2.6)
POTASSIUM SERPL-SCNC: 4.2 MMOL/L (ref 3.5–5.2)
PROT SERPL-MCNC: 6.6 G/DL (ref 6–8.5)
SODIUM SERPL-SCNC: 141 MMOL/L (ref 136–145)

## 2021-10-06 PROCEDURE — 36415 COLL VENOUS BLD VENIPUNCTURE: CPT

## 2021-10-06 PROCEDURE — 80053 COMPREHEN METABOLIC PANEL: CPT

## 2021-10-06 PROCEDURE — 83735 ASSAY OF MAGNESIUM: CPT

## 2021-10-08 ENCOUNTER — APPOINTMENT (OUTPATIENT)
Dept: OTHER | Facility: HOSPITAL | Age: 41
End: 2021-10-08

## 2021-10-08 ENCOUNTER — OFFICE VISIT (OUTPATIENT)
Dept: ONCOLOGY | Facility: CLINIC | Age: 41
End: 2021-10-08

## 2021-10-08 VITALS
WEIGHT: 155.5 LBS | OXYGEN SATURATION: 99 % | HEIGHT: 69 IN | DIASTOLIC BLOOD PRESSURE: 74 MMHG | BODY MASS INDEX: 23.03 KG/M2 | RESPIRATION RATE: 18 BRPM | SYSTOLIC BLOOD PRESSURE: 114 MMHG | HEART RATE: 65 BPM | TEMPERATURE: 97.5 F

## 2021-10-08 DIAGNOSIS — D69.6 THROMBOCYTOPENIA (HCC): Primary | ICD-10-CM

## 2021-10-08 PROCEDURE — 99214 OFFICE O/P EST MOD 30 MIN: CPT | Performed by: INTERNAL MEDICINE

## 2021-10-08 RX ORDER — NADOLOL 40 MG/1
40 TABLET ORAL DAILY
COMMUNITY
Start: 2021-09-13 | End: 2021-11-05

## 2021-10-08 NOTE — PROGRESS NOTES
"    .     REASON FOR FOLLOWUP : Thrombocytopenia, iron deficiency anemia, alcoholic cirrhosis    HISTORY OF PRESENT ILLNESS:  The patient is a 41 y.o. year old male  who is here for follow-up with the above-mentioned history.    Having little more heartburn recently.  I recommended he discuss this with GI.  He is already on Protonix.    No bleeding, bruising problems.  No significant fever, chills, weight loss, night sweats.    Past Medical History:   Diagnosis Date   • Acquired stuttering     WIFE STATES HE CAN BE HARD TO UNDERSTAND.. \"[place] ACCENT AND STUTTERS\"   • Bleeding esophageal varices (HCC)    • Cirrhosis (HCC)     end-stage   • H/O PAF (paroxysmal atrial fibrillation) (CMS/HCC)    • Hepatosplenomegaly     secondary to cirrhosis   • History of alcohol abuse    • History of anemia    • History of ascites    • History of sepsis    • History of transfusion    • Hyperlipidemia      Past Surgical History:   Procedure Laterality Date   • ENDOSCOPY N/A 11/18/2020    Procedure: ESOPHAGOGASTRODUODENOSCOPY with esophageal banding x3;  Surgeon: Erik Magaña MD;  Location: [facility];  Service: Gastroenterology;  Laterality: N/A;  pre - cirrhosis of the liver and varices  post - gastritis, portal hypertensive gastropathy   • ENDOSCOPY W/ BANDING  2020    varices   • HERNIA REPAIR     • PARACENTESIS      900 cc fluid drained       MEDICATIONS    Current Outpatient Medications:   •  ferrous sulfate 324 (65 Fe) MG tablet delayed-release EC tablet, Take 324 mg by mouth Daily With Breakfast., Disp: , Rfl:   •  furosemide (LASIX) 40 MG tablet, Take 1 tablet by mouth Daily., Disp: 90 tablet, Rfl: 3  •  glucosamine-chondroitin 500-400 MG capsule capsule, Take  by mouth 3 (Three) Times a Day With Meals., Disp: , Rfl:   •  nadolol (CORGARD) 40 MG tablet, Take 40 mg by mouth Daily., Disp: , Rfl:   •  pantoprazole (PROTONIX) 40 MG EC tablet, Take 1 tablet by mouth Daily., Disp: 90 tablet, Rfl: 3  •  spironolactone " (Aldactone) 25 MG tablet, Take 1 tablet by mouth Daily. (Patient taking differently: Take 25 mg by mouth Daily. Taking .5 tablet daily), Disp: 90 tablet, Rfl: 3  •  sucralfate (Carafate) 1 g tablet, Take 1 tablet by mouth 4 (Four) Times a Day. Dissolve in 10 to 15 cc water, Disp: 120 tablet, Rfl: 0  •  traZODone (DESYREL) 50 MG tablet, Take 50 mg by mouth Every Night., Disp: , Rfl:   •  nadolol (CORGARD) 40 MG tablet, Take 1 tablet by mouth Daily., Disp: 90 tablet, Rfl: 3    ALLERGIES:   No Known Allergies    SOCIAL HISTORY:       Social History     Socioeconomic History   • Marital status:      Spouse name: Not on file   • Number of children: Not on file   • Years of education: Not on file   • Highest education level: Not on file   Tobacco Use   • Smoking status: Former Smoker     Packs/day: 1.00     Types: Cigarettes   • Smokeless tobacco: Never Used   • Tobacco comment: quit 6 months ago   Vaping Use   • Vaping Use: Never used   Substance and Sexual Activity   • Alcohol use: Not Currently     Comment: H/O alcoholism (quit 6 months ago)   • Drug use: Yes     Frequency: 1.0 times per week     Types: Marijuana   • Sexual activity: Defer         FAMILY HISTORY:  Family History   Problem Relation Age of Onset   • Ovarian cancer Mother    • Throat cancer Father    • Malig Hyperthermia Neg Hx        REVIEW OF SYSTEMS:  Review of Systems   Constitutional: Negative for activity change.   HENT: Negative for nosebleeds and trouble swallowing.    Respiratory: Negative for shortness of breath and wheezing.    Cardiovascular: Negative for chest pain and palpitations.   Gastrointestinal: Negative for constipation, diarrhea and nausea.   Genitourinary: Negative for dysuria and hematuria.   Musculoskeletal: Negative for arthralgias and myalgias.   Neurological: Negative for seizures and syncope.   Hematological: Negative for adenopathy. Does not bruise/bleed easily.   Psychiatric/Behavioral: Negative for confusion.  "             Vitals:    10/08/21 0924   BP: 114/74   Pulse: 65   Resp: 18   Temp: 97.5 °F (36.4 °C)   TempSrc: Temporal   SpO2: 99%   Weight: 70.5 kg (155 lb 8 oz)   Height: 175.3 cm (69.02\")   PainSc: 0-No pain     Current Status 10/8/2021   ECOG score 0      PHYSICAL EXAM:        CONSTITUTIONAL:  Vital signs reviewed.  No distress, looks comfortable.  EYES:  Conjunctiva and lids unremarkable.  PERRLA  EARS,NOSE,MOUTH,THROAT:  Ears and nose appear unremarkable.  Lips, teeth, gums appear unremarkable.  RESPIRATORY:  Normal respiratory effort.  Lungs clear to auscultation bilaterally.  CARDIOVASCULAR:  Normal S1, S2.  No murmurs rubs or gallops.  No significant lower extremity edema.  GASTROINTESTINAL: Abdomen appears unremarkable.  Nontender.  No hepatomegaly.  No splenomegaly.  LYMPHATIC:  No cervical, supraclavicular, axillary lymphadenopathy.  SKIN:  Warm.  No rashes.  PSYCHIATRIC:  Normal judgment and insight.  Normal mood and affect.        RECENT LABS:        WBC   Date Value Ref Range Status   10/01/2021 3.54 3.40 - 10.80 10*3/mm3 Final   09/02/2021 4.10 3.40 - 10.80 10*3/mm3 Final   04/09/2021 4.10 3.40 - 10.80 10*3/mm3 Final   02/12/2021 5.50 3.40 - 10.80 10*3/mm3 Final   12/11/2020 4.68 3.40 - 10.80 10*3/mm3 Final   10/27/2020 5.65 3.40 - 10.80 10*3/mm3 Final   10/09/2020 6.22 3.40 - 10.80 10*3/mm3 Final     Hemoglobin   Date Value Ref Range Status   10/01/2021 13.9 13.0 - 17.7 g/dL Final   09/02/2021 13.8 13.0 - 17.7 g/dL Final   04/09/2021 13.1 13.0 - 17.7 g/dL Final   02/12/2021 14.1 13.0 - 17.7 g/dL Final   12/11/2020 13.1 13.0 - 17.7 g/dL Final   10/27/2020 11.7 (L) 13.0 - 17.7 g/dL Final   10/09/2020 11.3 (L) 13.0 - 17.7 g/dL Final     Platelets   Date Value Ref Range Status   10/01/2021 57 (L) 140 - 450 10*3/mm3 Final   09/02/2021 73 (L) 140 - 450 10*3/mm3 Final   04/09/2021 74 (L) 140 - 450 10*3/mm3 Final   02/12/2021 76 (L) 140 - 450 10*3/mm3 Final   12/11/2020 81 (L) 140 - 450 10*3/mm3 Final "   12/11/2020 81 (L) 140 - 450 10*3/mm3 Final   10/27/2020 89 (L) 140 - 450 10*3/mm3 Final   10/09/2020 92 (L) 140 - 450 10*3/mm3 Final   10/09/2020 95 (L) 140 - 450 10*3/mm3 Final       Assessment/Plan   There are no diagnoses linked to this encounter.  El Jiang     *Thrombocytopenia, due to alcoholic cirrhosis  · Unfortunately, there is no treatment to improve thrombocytopenia due to alcoholic cirrhosis.  I encouraged continued alcohol cessation, to try to avoid worsening of thrombocytopenia.  · 10/8/2021: PLT 57.  PLT is gradually trending down.  I told him we would consider PLT transfusion if PLT <20.    *Iron deficiency anemia.  · On 9/10/2020,  Hb 9.1, Ferritin 14.  9% saturation.  · Oral iron 1 tablet daily started 9/10/2020.  · On 10/9/2020, Hb 11.3 (improved with oral iron).  · On 12/11/2020, ferritin 102, 34% saturation, Hb 13.1  · Continue oral iron daily  · (Ongoing intermittent mild blood in stools)  · RBC folate checked and normal  · 4/16/2021: Ferritin 132, 20% saturation, Hb 13.1  Continue oral iron daily  · 10/1/2021: Ferritin 223, 18% saturation, Hb 13.9    *Source of iron deficiency  · Grade 2 esophageal varices on EGD, Dr. Magaña, 11/18/2020.  Banded.  Moderate portal hypertensive gastropathy.    *Alcoholic cirrhosis  · He was told previously he needs a liver transplant.  · Sometime around August 2020, while in [place], hospitalization with banding of esophageal varices and 0.9 L ascitic fluid removed.  · Dr. Denis Magaña is following.  He is following clinically for possible future referral for transplant.  Patient states he was told he does not need a transplant now.    *Alcoholism  · Still no alcohol since 8/4/2020  Remains abstinent from alcohol.  I congratulated him on his continued success.    *Paroxysmal atrial fibrillation.    · Evaluated by Dr. Rupert Leroy on 10/13/2020.  Dr. Leroy stated although the patient's mother-in-law who is a retired nurse understands the patient had A.  fib, Dr. Leroy states he could not to find anywhere in the records documentation of A. 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  1 week prior: Iron labs (also checking IPF, B12, RBC folate due to gradually dropping PLT)  · I do not expect his PLT count to improve.    Send copy this to  Dr. Adrian Ibrahim, PCP

## 2021-10-12 RX ORDER — SPIRONOLACTONE 25 MG/1
25 TABLET ORAL DAILY
Qty: 90 TABLET | Refills: 3 | Status: SHIPPED | OUTPATIENT
Start: 2021-10-12 | End: 2022-02-22 | Stop reason: SDUPTHER

## 2021-10-12 RX ORDER — PANTOPRAZOLE SODIUM 40 MG/1
40 TABLET, DELAYED RELEASE ORAL DAILY
Qty: 90 TABLET | Refills: 3 | Status: SHIPPED | OUTPATIENT
Start: 2021-10-12 | End: 2021-11-05 | Stop reason: SDUPTHER

## 2021-10-15 ENCOUNTER — IMMUNIZATION (OUTPATIENT)
Dept: VACCINE CLINIC | Facility: HOSPITAL | Age: 41
End: 2021-10-15

## 2021-10-15 PROCEDURE — 0004A ADM SARSCOV2 30MCG/0.3ML BOOSTER: CPT | Performed by: INTERNAL MEDICINE

## 2021-10-15 PROCEDURE — 91300 HC SARSCOV02 VAC 30MCG/0.3ML IM: CPT | Performed by: INTERNAL MEDICINE

## 2021-11-05 RX ORDER — NADOLOL 40 MG/1
40 TABLET ORAL DAILY
Qty: 90 TABLET | Refills: 3 | Status: SHIPPED | OUTPATIENT
Start: 2021-11-05 | End: 2021-11-14 | Stop reason: SDUPTHER

## 2021-11-05 RX ORDER — PANTOPRAZOLE SODIUM 40 MG/1
40 TABLET, DELAYED RELEASE ORAL DAILY
Qty: 90 TABLET | Refills: 3 | Status: SHIPPED | OUTPATIENT
Start: 2021-11-05 | End: 2022-02-22 | Stop reason: SDUPTHER

## 2021-11-12 RX ORDER — NADOLOL 40 MG/1
40 TABLET ORAL DAILY
Qty: 90 TABLET | Refills: 3 | Status: CANCELLED | OUTPATIENT
Start: 2021-11-12

## 2021-11-14 RX ORDER — NADOLOL 40 MG/1
40 TABLET ORAL DAILY
Qty: 90 TABLET | Refills: 3 | Status: SHIPPED | OUTPATIENT
Start: 2021-11-14 | End: 2022-02-22 | Stop reason: SDUPTHER

## 2021-12-29 ENCOUNTER — TELEPHONE (OUTPATIENT)
Dept: ONCOLOGY | Facility: CLINIC | Age: 41
End: 2021-12-29

## 2022-01-04 ENCOUNTER — LAB (OUTPATIENT)
Dept: OTHER | Facility: HOSPITAL | Age: 42
End: 2022-01-04

## 2022-01-04 DIAGNOSIS — D69.6 THROMBOCYTOPENIA: Primary | ICD-10-CM

## 2022-01-11 ENCOUNTER — APPOINTMENT (OUTPATIENT)
Dept: OTHER | Facility: HOSPITAL | Age: 42
End: 2022-01-11

## 2022-01-14 ENCOUNTER — LAB (OUTPATIENT)
Dept: OTHER | Facility: HOSPITAL | Age: 42
End: 2022-01-14

## 2022-01-14 DIAGNOSIS — D69.6 THROMBOCYTOPENIA: ICD-10-CM

## 2022-01-14 LAB
BASOPHILS # BLD AUTO: 0.02 10*3/MM3 (ref 0–0.2)
BASOPHILS NFR BLD AUTO: 0.5 % (ref 0–1.5)
DEPRECATED RDW RBC AUTO: 49.1 FL (ref 37–54)
EOSINOPHIL # BLD AUTO: 0.38 10*3/MM3 (ref 0–0.4)
EOSINOPHIL NFR BLD AUTO: 9.9 % (ref 0.3–6.2)
ERYTHROCYTE [DISTWIDTH] IN BLOOD BY AUTOMATED COUNT: 14.4 % (ref 12.3–15.4)
FERRITIN SERPL-MCNC: 213.4 NG/ML (ref 30–400)
HBA1C MFR BLD: 4.8 % (ref 4.8–5.6)
HCT VFR BLD AUTO: 39.6 % (ref 37.5–51)
HGB BLD-MCNC: 13.2 G/DL (ref 13–17.7)
HGB RETIC QN AUTO: 35.8 PG (ref 29.8–36.1)
IMM GRANULOCYTES # BLD AUTO: 0.03 10*3/MM3 (ref 0–0.05)
IMM GRANULOCYTES NFR BLD AUTO: 0.8 % (ref 0–0.5)
IMM RETICS NFR: 8.8 % (ref 3–15.8)
IRON 24H UR-MRATE: 54 MCG/DL (ref 59–158)
IRON SATN MFR SERPL: 18 % (ref 20–50)
LYMPHOCYTES # BLD AUTO: 0.69 10*3/MM3 (ref 0.7–3.1)
LYMPHOCYTES NFR BLD AUTO: 18 % (ref 19.6–45.3)
MCH RBC QN AUTO: 31 PG (ref 26.6–33)
MCHC RBC AUTO-ENTMCNC: 33.3 G/DL (ref 31.5–35.7)
MCV RBC AUTO: 93 FL (ref 79–97)
MONOCYTES # BLD AUTO: 0.43 10*3/MM3 (ref 0.1–0.9)
MONOCYTES NFR BLD AUTO: 11.2 % (ref 5–12)
NEUTROPHILS NFR BLD AUTO: 2.28 10*3/MM3 (ref 1.7–7)
NEUTROPHILS NFR BLD AUTO: 59.6 % (ref 42.7–76)
NRBC BLD AUTO-RTO: 0 /100 WBC (ref 0–0.2)
PLATELET # BLD AUTO: 71 10*3/MM3 (ref 140–450)
PLATELET # BLD AUTO: 71 10*3/MM3 (ref 140–450)
PLATELETS.RETICULATED NFR BLD AUTO: 6.3 % (ref 0.9–6.5)
PMV BLD AUTO: 11.8 FL (ref 6–12)
RBC # BLD AUTO: 4.26 10*6/MM3 (ref 4.14–5.8)
RETICS # AUTO: 0.07 10*6/MM3 (ref 0.02–0.13)
RETICS/RBC NFR AUTO: 1.69 % (ref 0.7–1.9)
TIBC SERPL-MCNC: 302 MCG/DL (ref 298–536)
TRANSFERRIN SERPL-MCNC: 203 MG/DL (ref 200–360)
VIT B12 BLD-MCNC: 1878 PG/ML (ref 211–946)
WBC NRBC COR # BLD: 3.83 10*3/MM3 (ref 3.4–10.8)

## 2022-01-14 PROCEDURE — 85025 COMPLETE CBC W/AUTO DIFF WBC: CPT | Performed by: INTERNAL MEDICINE

## 2022-01-14 PROCEDURE — 36415 COLL VENOUS BLD VENIPUNCTURE: CPT

## 2022-01-14 PROCEDURE — 83036 HEMOGLOBIN GLYCOSYLATED A1C: CPT | Performed by: INTERNAL MEDICINE

## 2022-01-14 PROCEDURE — 85014 HEMATOCRIT: CPT | Performed by: INTERNAL MEDICINE

## 2022-01-14 PROCEDURE — 82747 ASSAY OF FOLIC ACID RBC: CPT | Performed by: INTERNAL MEDICINE

## 2022-01-14 PROCEDURE — 85046 RETICYTE/HGB CONCENTRATE: CPT | Performed by: INTERNAL MEDICINE

## 2022-01-14 PROCEDURE — 82607 VITAMIN B-12: CPT | Performed by: INTERNAL MEDICINE

## 2022-01-14 PROCEDURE — 83540 ASSAY OF IRON: CPT | Performed by: INTERNAL MEDICINE

## 2022-01-14 PROCEDURE — 84466 ASSAY OF TRANSFERRIN: CPT | Performed by: INTERNAL MEDICINE

## 2022-01-14 PROCEDURE — 85055 RETICULATED PLATELET ASSAY: CPT | Performed by: INTERNAL MEDICINE

## 2022-01-14 PROCEDURE — 82728 ASSAY OF FERRITIN: CPT | Performed by: INTERNAL MEDICINE

## 2022-01-16 LAB
FOLATE BLD-MCNC: 545 NG/ML
FOLATE RBC-MCNC: 1412 NG/ML
HCT VFR BLD AUTO: 38.6 % (ref 37.5–51)

## 2022-01-21 ENCOUNTER — APPOINTMENT (OUTPATIENT)
Dept: OTHER | Facility: HOSPITAL | Age: 42
End: 2022-01-21

## 2022-01-21 ENCOUNTER — OFFICE VISIT (OUTPATIENT)
Dept: ONCOLOGY | Facility: CLINIC | Age: 42
End: 2022-01-21

## 2022-01-21 VITALS
TEMPERATURE: 98.4 F | RESPIRATION RATE: 18 BRPM | WEIGHT: 160.7 LBS | HEIGHT: 69 IN | SYSTOLIC BLOOD PRESSURE: 121 MMHG | DIASTOLIC BLOOD PRESSURE: 80 MMHG | HEART RATE: 70 BPM | OXYGEN SATURATION: 99 % | BODY MASS INDEX: 23.8 KG/M2

## 2022-01-21 DIAGNOSIS — D69.6 THROMBOCYTOPENIA: Primary | ICD-10-CM

## 2022-01-21 DIAGNOSIS — D64.9 ANEMIA, UNSPECIFIED TYPE: ICD-10-CM

## 2022-01-21 PROCEDURE — 99214 OFFICE O/P EST MOD 30 MIN: CPT | Performed by: INTERNAL MEDICINE

## 2022-01-21 NOTE — PROGRESS NOTES
"    .     REASON FOR FOLLOWUP : Thrombocytopenia, iron deficiency anemia, alcoholic cirrhosis    HISTORY OF PRESENT ILLNESS:  The patient is a 41 y.o. year old male  who is here for follow-up with the above-mentioned history.    No new problems.  No bleeding, chest pain, SOA        Past Medical History:   Diagnosis Date   • Acquired stuttering     WIFE STATES HE CAN BE HARD TO UNDERSTAND.. \"[place] ACCENT AND STUTTERS\"   • Bleeding esophageal varices (HCC)    • Cirrhosis (HCC)     end-stage   • H/O PAF (paroxysmal atrial fibrillation) (CMS/HCC)    • Hepatosplenomegaly     secondary to cirrhosis   • History of alcohol abuse    • History of anemia    • History of ascites    • History of sepsis    • History of transfusion    • Hyperlipidemia      Past Surgical History:   Procedure Laterality Date   • ENDOSCOPY N/A 11/18/2020    Procedure: ESOPHAGOGASTRODUODENOSCOPY with esophageal banding x3;  Surgeon: Erik Magaña MD;  Location: [facility] ENDOSCOPY;  Service: Gastroenterology;  Laterality: N/A;  pre - cirrhosis of the liver and varices  post - gastritis, portal hypertensive gastropathy   • ENDOSCOPY W/ BANDING  2020    varices   • HERNIA REPAIR     • PARACENTESIS      900 cc fluid drained       MEDICATIONS    Current Outpatient Medications:   •  ferrous sulfate 324 (65 Fe) MG tablet delayed-release EC tablet, Take 324 mg by mouth Daily With Breakfast., Disp: , Rfl:   •  furosemide (LASIX) 40 MG tablet, Take 1 tablet by mouth Daily., Disp: 90 tablet, Rfl: 3  •  glucosamine-chondroitin 500-400 MG capsule capsule, Take  by mouth 3 (Three) Times a Day With Meals., Disp: , Rfl:   •  nadolol (CORGARD) 40 MG tablet, Take 1 tablet by mouth Daily., Disp: 90 tablet, Rfl: 3  •  pantoprazole (PROTONIX) 40 MG EC tablet, Take 1 tablet by mouth Daily., Disp: 90 tablet, Rfl: 3  •  spironolactone (Aldactone) 25 MG tablet, Take 1 tablet by mouth Daily., Disp: 90 tablet, Rfl: 3  •  sucralfate (Carafate) 1 g tablet, Take 1 tablet by " "mouth 4 (Four) Times a Day. Dissolve in 10 to 15 cc water, Disp: 120 tablet, Rfl: 0  •  traZODone (DESYREL) 50 MG tablet, Take 50 mg by mouth Every Night., Disp: , Rfl:     ALLERGIES:   No Known Allergies    SOCIAL HISTORY:       Social History     Socioeconomic History   • Marital status:    Tobacco Use   • Smoking status: Former Smoker     Packs/day: 1.00     Types: Cigarettes   • Smokeless tobacco: Never Used   • Tobacco comment: quit 6 months ago   Vaping Use   • Vaping Use: Never used   Substance and Sexual Activity   • Alcohol use: Not Currently     Comment: H/O alcoholism (quit 6 months ago)   • Drug use: Yes     Frequency: 1.0 times per week     Types: Marijuana   • Sexual activity: Defer         FAMILY HISTORY:  Family History   Problem Relation Age of Onset   • Ovarian cancer Mother    • Throat cancer Father    • Malig Hyperthermia Neg Hx        REVIEW OF SYSTEMS:  Review of Systems   Constitutional: Negative for activity change.   HENT: Negative for nosebleeds and trouble swallowing.    Respiratory: Negative for shortness of breath and wheezing.    Cardiovascular: Negative for chest pain and palpitations.   Gastrointestinal: Negative for constipation, diarrhea and nausea.   Genitourinary: Negative for dysuria and hematuria.   Musculoskeletal: Negative for arthralgias and myalgias.   Neurological: Negative for seizures and syncope.   Hematological: Negative for adenopathy. Does not bruise/bleed easily.   Psychiatric/Behavioral: Negative for confusion.              Vitals:    01/21/22 1443   BP: 121/80   Pulse: 70   Resp: 18   Temp: 98.4 °F (36.9 °C)   TempSrc: Temporal   SpO2: 99%   Weight: 72.9 kg (160 lb 11.2 oz)   Height: 175.3 cm (69.02\")   PainSc: 0-No pain     Current Status 1/21/2022   ECOG score 0      PHYSICAL EXAM:        CONSTITUTIONAL:  Vital signs reviewed.  No distress, looks comfortable.  EYES:  Conjunctiva and lids unremarkable.  PERRLA  EARS,NOSE,MOUTH,THROAT:  Ears and nose appear " unremarkable.  Lips, teeth, gums appear unremarkable.  RESPIRATORY:  Normal respiratory effort.  Lungs clear to auscultation bilaterally.  CARDIOVASCULAR:  Normal S1, S2.  No murmurs rubs or gallops.  No significant lower extremity edema.  GASTROINTESTINAL: Abdomen appears unremarkable.  Nontender.  No hepatomegaly.  No splenomegaly.  LYMPHATIC:  No cervical, supraclavicular, axillary lymphadenopathy.  SKIN:  Warm.  No rashes.  PSYCHIATRIC:  Normal judgment and insight.  Normal mood and affect.           RECENT LABS:        WBC   Date Value Ref Range Status   01/19/2022 5.23 4.5 - 11.0 10*3/uL Final   01/14/2022 3.83 3.40 - 10.80 10*3/mm3 Final   10/20/2021 5.29 4.5 - 11.0 10*3/uL Final   10/01/2021 3.54 3.40 - 10.80 10*3/mm3 Final   09/02/2021 4.10 3.40 - 10.80 10*3/mm3 Final   04/09/2021 4.10 3.40 - 10.80 10*3/mm3 Final   02/12/2021 5.50 3.40 - 10.80 10*3/mm3 Final   12/11/2020 4.68 3.40 - 10.80 10*3/mm3 Final   10/27/2020 5.65 3.40 - 10.80 10*3/mm3 Final   10/09/2020 6.22 3.40 - 10.80 10*3/mm3 Final     Hemoglobin   Date Value Ref Range Status   01/19/2022 13.9 13.5 - 17.5 g/dL Final   01/14/2022 13.2 13.0 - 17.7 g/dL Final   10/20/2021 13.9 13.5 - 17.5 g/dL Final   10/01/2021 13.9 13.0 - 17.7 g/dL Final   09/02/2021 13.8 13.0 - 17.7 g/dL Final   04/09/2021 13.1 13.0 - 17.7 g/dL Final   02/12/2021 14.1 13.0 - 17.7 g/dL Final   12/11/2020 13.1 13.0 - 17.7 g/dL Final   10/27/2020 11.7 (L) 13.0 - 17.7 g/dL Final   10/09/2020 11.3 (L) 13.0 - 17.7 g/dL Final     Platelets   Date Value Ref Range Status   01/19/2022 71 (L) 140 - 440 10*3/uL Final   01/14/2022 71 (L) 140 - 450 10*3/mm3 Final   01/14/2022 71 (L) 140 - 450 10*3/mm3 Final     Comment:     Checked for clot; none detected   10/20/2021 65 (L) 140 - 440 10*3/uL Final   10/01/2021 57 (L) 140 - 450 10*3/mm3 Final   09/02/2021 73 (L) 140 - 450 10*3/mm3 Final   04/09/2021 74 (L) 140 - 450 10*3/mm3 Final   02/12/2021 76 (L) 140 - 450 10*3/mm3 Final   12/11/2020 81  (L) 140 - 450 10*3/mm3 Final   12/11/2020 81 (L) 140 - 450 10*3/mm3 Final   10/27/2020 89 (L) 140 - 450 10*3/mm3 Final   10/09/2020 92 (L) 140 - 450 10*3/mm3 Final   10/09/2020 95 (L) 140 - 450 10*3/mm3 Final       Assessment/Plan   Thrombocytopenia (HCC)  - Ferritin  - Iron Profile  - Retic With IRF & RET-He  - CBC & Differential  - Immature Platelet Fraction    Anemia, unspecified type  - Ferritin  - Iron Profile  - Retic With IRF & RET-He  - CBC & Differential  - Immature Platelet Fraction    El Jiang     *Thrombocytopenia, due to alcoholic cirrhosis  · Unfortunately, there is no treatment to improve thrombocytopenia due to alcoholic cirrhosis.  I encouraged continued alcohol cessation, to try to avoid worsening of thrombocytopenia.  · 10/8/2021: PLT 57.  PLT is gradually trending down.  I told him we would consider PLT transfusion if PLT <20.  · B12 and RBC folate unremarkable  · 1/19/2022: PLT 71    *Iron deficiency anemia.  · On 9/10/2020,  Hb 9.1, Ferritin 14.  9% saturation.  · Oral iron 1 tablet daily started 9/10/2020.  · On 10/9/2020, Hb 11.3 (improved with oral iron).  · On 12/11/2020, ferritin 102, 34% saturation, Hb 13.1  · Continue oral iron daily  · (Ongoing intermittent mild blood in stools)  · RBC folate checked and normal  · 4/16/2021: Ferritin 132, 20% saturation, Hb 13.1  Continue oral iron daily  · 10/1/2021: Ferritin 223, 18% saturation, Hb 13.9  · 1/14/2022: Ferritin 213, 18% saturation, Hb 13.9    *Source of iron deficiency  · Grade 2 esophageal varices on EGD, Dr. Magaña, 11/18/2020.  Banded.  Moderate portal hypertensive gastropathy.    *Alcoholic cirrhosis  · He was told previously he needs a liver transplant.  · Sometime around August 2020, while in [place], hospitalization with banding of esophageal varices and 0.9 L ascitic fluid removed.  · Dr. Denis Magaña is following.  He is following clinically for possible future referral for transplant.  Patient states he was told he  does not need a transplant now.    *Alcoholism  · Still no alcohol since 8/4/2020  Continues to be abstinent from alcohol.  I congratulated him on his continued success.    *Paroxysmal atrial fibrillation.    · Evaluated by Dr. Rupert Leroy on 10/13/2020.  Dr. Leroy stated although the patient's mother-in-law who is a retired nurse understands the patient had A. fib, Dr. Leroy states he could not to find anywhere in the records documentation of A. 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  1 week prior: Ferritin, iron panel, CBC, reticulate hemoglobin, IPF  · I do not expect his PLT count to improve.    Send copy this to  Dr. Adrian Ibrahim, PCP

## 2022-02-22 ENCOUNTER — OFFICE VISIT (OUTPATIENT)
Dept: GASTROENTEROLOGY | Facility: CLINIC | Age: 42
End: 2022-02-22

## 2022-02-22 VITALS — TEMPERATURE: 97.8 F | WEIGHT: 163 LBS | HEIGHT: 69 IN | BODY MASS INDEX: 24.14 KG/M2

## 2022-02-22 DIAGNOSIS — I85.10 SECONDARY ESOPHAGEAL VARICES WITHOUT BLEEDING: ICD-10-CM

## 2022-02-22 DIAGNOSIS — K70.30 ALCOHOLIC CIRRHOSIS OF LIVER WITHOUT ASCITES: Primary | ICD-10-CM

## 2022-02-22 DIAGNOSIS — K21.9 GASTROESOPHAGEAL REFLUX DISEASE, UNSPECIFIED WHETHER ESOPHAGITIS PRESENT: ICD-10-CM

## 2022-02-22 PROCEDURE — 99214 OFFICE O/P EST MOD 30 MIN: CPT | Performed by: NURSE PRACTITIONER

## 2022-02-22 RX ORDER — NADOLOL 40 MG/1
40 TABLET ORAL DAILY
Qty: 90 TABLET | Refills: 3 | Status: SHIPPED | OUTPATIENT
Start: 2022-02-22 | End: 2022-06-04 | Stop reason: SDUPTHER

## 2022-02-22 RX ORDER — PANTOPRAZOLE SODIUM 40 MG/1
40 TABLET, DELAYED RELEASE ORAL 2 TIMES DAILY
Qty: 180 TABLET | Refills: 3 | Status: SHIPPED | OUTPATIENT
Start: 2022-02-22 | End: 2022-06-04 | Stop reason: SDUPTHER

## 2022-02-22 RX ORDER — SPIRONOLACTONE 25 MG/1
25 TABLET ORAL DAILY
Qty: 90 TABLET | Refills: 3 | Status: SHIPPED | OUTPATIENT
Start: 2022-02-22 | End: 2022-06-04 | Stop reason: SDUPTHER

## 2022-02-22 RX ORDER — FUROSEMIDE 40 MG/1
40 TABLET ORAL DAILY
Qty: 90 TABLET | Refills: 3 | Status: SHIPPED | OUTPATIENT
Start: 2022-02-22 | End: 2022-06-04 | Stop reason: SDUPTHER

## 2022-02-22 NOTE — PROGRESS NOTES
"Chief Complaint   Patient presents with   • Cirrhosis       HPI    El Jiang is a  41 y.o. male here for a follow up visit for alcohol induced cirrhosis.    This patient follows with Dr. Magaña and myself.    Patient's been sober for almost 2 years.    Esophageal varices with history of bleed, s/p banding- EGD: 11/19/2020: Grade 2 esophageal varices, completely eradicated with banding, portal hypertensive gastropathy.   Takes Nadolol 40 mg by mouth daily.  Takes a combination of Lasix 40 mg once daily and Aldactone 25 mg once daily to prevent abdominal ascites or bilateral lower extremity edema.  Reports adequate control of dyspeptic symptoms on twice daily dosing Protonix.  Plans for liver ultrasound in April with [facility] transplant center.  He is currently being monitored he has not decompensated enough for transplant.  Has a follow-up with their services every 3 months.  Recent meld score around 13.  No diarrhea, constipation, rectal bleeding.    Past Medical History:   Diagnosis Date   • Acquired stuttering     WIFE STATES HE CAN BE HARD TO UNDERSTAND.. \"[place] ACCENT AND STUTTERS\"   • Bleeding esophageal varices (HCC)    • Cirrhosis (HCC)     end-stage   • H/O PAF (paroxysmal atrial fibrillation) (CMS/HCC)    • Hepatosplenomegaly     secondary to cirrhosis   • History of alcohol abuse    • History of anemia    • History of ascites    • History of sepsis    • History of transfusion    • Hyperlipidemia        Past Surgical History:   Procedure Laterality Date   • ENDOSCOPY N/A 11/18/2020    Procedure: ESOPHAGOGASTRODUODENOSCOPY with esophageal banding x3;  Surgeon: Erik Magaña MD;  Location: [facility] ENDOSCOPY;  Service: Gastroenterology;  Laterality: N/A;  pre - cirrhosis of the liver and varices  post - gastritis, portal hypertensive gastropathy   • ENDOSCOPY W/ BANDING  2020    varices   • HERNIA REPAIR     • PARACENTESIS      900 cc fluid drained       Scheduled Meds: "     Continuous Infusions: No current facility-administered medications for this visit.      PRN Meds:     No Known Allergies    Social History     Socioeconomic History   • Marital status:    Tobacco Use   • Smoking status: Current Every Day Smoker     Packs/day: 0.50     Types: Cigarettes   • Smokeless tobacco: Never Used   • Tobacco comment: quit 6 months ago   Vaping Use   • Vaping Use: Never used   Substance and Sexual Activity   • Alcohol use: Not Currently     Comment: H/O alcoholism (quit 08/2020)   • Drug use: Yes     Frequency: 1.0 times per week     Types: Marijuana   • Sexual activity: Defer       Family History   Problem Relation Age of Onset   • Ovarian cancer Mother    • Throat cancer Father    • Malig Hyperthermia Neg Hx        Review of Systems   Constitutional: Negative for activity change, appetite change, fatigue, fever and unexpected weight change.   HENT: Negative for trouble swallowing.    Respiratory: Negative for apnea, cough, choking, chest tightness, shortness of breath and wheezing.    Cardiovascular: Negative for chest pain, palpitations and leg swelling.   Gastrointestinal: Negative for abdominal distention, abdominal pain, anal bleeding, blood in stool, constipation, diarrhea, nausea, rectal pain and vomiting.       Vitals:    02/22/22 0834   Temp: 97.8 °F (36.6 °C)       Physical Exam  Constitutional:       Appearance: He is well-developed.   Abdominal:      General: Bowel sounds are normal. There is no distension.      Palpations: Abdomen is soft. There is no mass.      Tenderness: There is no abdominal tenderness. There is no guarding.      Hernia: No hernia is present.   Skin:     General: Skin is warm and dry.      Capillary Refill: Capillary refill takes less than 2 seconds.   Neurological:      Mental Status: He is alert and oriented to person, place, and time.   Psychiatric:         Behavior: Behavior normal.       Assessment    Diagnoses and all orders for this  visit:    1. Alcoholic cirrhosis of liver without ascites (HCC) (Primary)  Overview:  end-stage    Orders:  -     Case Request; Standing  -     Case Request    2. Secondary esophageal varices without bleeding (HCC)  Overview:  Added automatically from request for surgery [id number]    Orders:  -     Case Request; Standing  -     Case Request    3. Gastroesophageal reflux disease, unspecified whether esophagitis present    Other orders  -     furosemide (LASIX) 40 MG tablet; Take 1 tablet by mouth Daily.  Dispense: 90 tablet; Refill: 3  -     nadolol (CORGARD) 40 MG tablet; Take 1 tablet by mouth Daily.  Dispense: 90 tablet; Refill: 3  -     pantoprazole (PROTONIX) 40 MG EC tablet; Take 1 tablet by mouth 2 (Two) Times a Day.  Dispense: 180 tablet; Refill: 3  -     spironolactone (Aldactone) 25 MG tablet; Take 1 tablet by mouth Daily.  Dispense: 90 tablet; Refill: 3       Plan    Arrange EGD with Dr. Magaña for esophageal varices surveillance to be [facility].  Continue nadolol.  Continue Lasix and Aldactone.  No indication for paracentesis on visit today.  Monitor salt intake, reviewed with patient.  Recent meld score less than 13.  Keep follow-ups with [facility] transplant group.  Continue twice daily dosing PPI therapy.  Keep scheduled ultrasound in April for hepatoma surveillance.  Return to cirrhosis clinic 6 months.    I spent 30 minutes caring for El on this date of service. This time includes time spent by me in the following activities: preparing for the visit, reviewing tests, obtaining and/or reviewing a separately obtained history, performing a medically appropriate examination and/or evaluation , counseling and educating the patient/family/caregiver, ordering medications, tests, or procedures, documenting information in the medical record, independently interpreting results and communicating that information with the patient/family/caregiver and care  coordination.           LAN Berry  [facility]  [address] [place] [address]  Office: [phone]

## 2022-03-15 ENCOUNTER — TRANSCRIBE ORDERS (OUTPATIENT)
Dept: ADMINISTRATIVE | Facility: HOSPITAL | Age: 42
End: 2022-03-15

## 2022-03-15 DIAGNOSIS — Z01.818 OTHER SPECIFIED PRE-OPERATIVE EXAMINATION: Primary | ICD-10-CM

## 2022-03-17 ENCOUNTER — TELEPHONE (OUTPATIENT)
Dept: GASTROENTEROLOGY | Facility: CLINIC | Age: 42
End: 2022-03-17

## 2022-03-17 DIAGNOSIS — Z01.818 PRE-OP TESTING: Primary | ICD-10-CM

## 2022-03-22 ENCOUNTER — LAB (OUTPATIENT)
Dept: LAB | Facility: HOSPITAL | Age: 42
End: 2022-03-22

## 2022-03-22 DIAGNOSIS — Z01.818 OTHER SPECIFIED PRE-OPERATIVE EXAMINATION: ICD-10-CM

## 2022-03-22 LAB — SARS-COV-2 ORF1AB RESP QL NAA+PROBE: NOT DETECTED

## 2022-03-22 PROCEDURE — U0004 COV-19 TEST NON-CDC HGH THRU: HCPCS

## 2022-03-22 PROCEDURE — C9803 HOPD COVID-19 SPEC COLLECT: HCPCS

## 2022-03-23 ENCOUNTER — HOSPITAL ENCOUNTER (OUTPATIENT)
Facility: HOSPITAL | Age: 42
Setting detail: HOSPITAL OUTPATIENT SURGERY
Discharge: HOME | End: 2022-03-23
Attending: INTERNAL MEDICINE | Admitting: INTERNAL MEDICINE

## 2022-03-23 ENCOUNTER — ANESTHESIA (OUTPATIENT)
Dept: GASTROENTEROLOGY | Facility: HOSPITAL | Age: 42
End: 2022-03-23

## 2022-03-23 ENCOUNTER — ANESTHESIA EVENT (OUTPATIENT)
Dept: GASTROENTEROLOGY | Facility: HOSPITAL | Age: 42
End: 2022-03-23

## 2022-03-23 VITALS
HEART RATE: 68 BPM | SYSTOLIC BLOOD PRESSURE: 115 MMHG | DIASTOLIC BLOOD PRESSURE: 78 MMHG | BODY MASS INDEX: 23.85 KG/M2 | HEIGHT: 69 IN | WEIGHT: 161 LBS | RESPIRATION RATE: 16 BRPM | OXYGEN SATURATION: 95 %

## 2022-03-23 DIAGNOSIS — I85.10 SECONDARY ESOPHAGEAL VARICES WITHOUT BLEEDING: ICD-10-CM

## 2022-03-23 DIAGNOSIS — K70.30 ALCOHOLIC CIRRHOSIS OF LIVER WITHOUT ASCITES: ICD-10-CM

## 2022-03-23 PROCEDURE — 43239 EGD BIOPSY SINGLE/MULTIPLE: CPT | Performed by: INTERNAL MEDICINE

## 2022-03-23 PROCEDURE — 88305 TISSUE EXAM BY PATHOLOGIST: CPT | Performed by: INTERNAL MEDICINE

## 2022-03-23 PROCEDURE — 25010000002 PROPOFOL 10 MG/ML EMULSION: Performed by: ANESTHESIOLOGY

## 2022-03-23 PROCEDURE — 43244 EGD VARICES LIGATION: CPT | Performed by: INTERNAL MEDICINE

## 2022-03-23 RX ORDER — SODIUM CHLORIDE 0.9 % (FLUSH) 0.9 %
10 SYRINGE (ML) INJECTION AS NEEDED
Status: DISCONTINUED | OUTPATIENT
Start: 2022-03-23 | End: 2022-03-23 | Stop reason: HOSPADM

## 2022-03-23 RX ORDER — PROPOFOL 10 MG/ML
VIAL (ML) INTRAVENOUS AS NEEDED
Status: DISCONTINUED | OUTPATIENT
Start: 2022-03-23 | End: 2022-03-23 | Stop reason: SURG

## 2022-03-23 RX ORDER — SODIUM CHLORIDE, SODIUM LACTATE, POTASSIUM CHLORIDE, CALCIUM CHLORIDE 600; 310; 30; 20 MG/100ML; MG/100ML; MG/100ML; MG/100ML
1000 INJECTION, SOLUTION INTRAVENOUS CONTINUOUS
Status: DISCONTINUED | OUTPATIENT
Start: 2022-03-23 | End: 2022-03-23 | Stop reason: HOSPADM

## 2022-03-23 RX ORDER — PROPOFOL 10 MG/ML
VIAL (ML) INTRAVENOUS CONTINUOUS PRN
Status: DISCONTINUED | OUTPATIENT
Start: 2022-03-23 | End: 2022-03-23 | Stop reason: SURG

## 2022-03-23 RX ORDER — LIDOCAINE HYDROCHLORIDE 20 MG/ML
INJECTION, SOLUTION INFILTRATION; PERINEURAL AS NEEDED
Status: DISCONTINUED | OUTPATIENT
Start: 2022-03-23 | End: 2022-03-23 | Stop reason: SURG

## 2022-03-23 RX ORDER — SUCRALFATE ORAL 1 G/10ML
1 SUSPENSION ORAL 4 TIMES DAILY
Qty: 420 ML | Refills: 1 | Status: SHIPPED | OUTPATIENT
Start: 2022-03-23 | End: 2022-04-22

## 2022-03-23 RX ADMIN — PROPOFOL 150 MG: 10 INJECTION, EMULSION INTRAVENOUS at 09:34

## 2022-03-23 RX ADMIN — SODIUM CHLORIDE, POTASSIUM CHLORIDE, SODIUM LACTATE AND CALCIUM CHLORIDE 1000 ML: 600; 310; 30; 20 INJECTION, SOLUTION INTRAVENOUS at 08:18

## 2022-03-23 RX ADMIN — Medication 300 MCG/KG/MIN: at 09:35

## 2022-03-23 RX ADMIN — LIDOCAINE HYDROCHLORIDE 60 MG: 20 INJECTION, SOLUTION INFILTRATION; PERINEURAL at 09:32

## 2022-03-23 NOTE — ANESTHESIA PREPROCEDURE EVALUATION
Anesthesia Evaluation     Patient summary reviewed and Nursing notes reviewed   NPO Solid Status: > 8 hours  NPO Liquid Status: > 4 hours           Airway   Mallampati: II  Neck ROM: full  No difficulty expected  Dental - normal exam     Pulmonary     breath sounds clear to auscultation  (+) a smoker Current,   Cardiovascular     Rhythm: regular    (+) dysrhythmias Atrial Fib, hyperlipidemia,       Neuro/Psych  GI/Hepatic/Renal/Endo    (+)  GI bleeding , liver disease cirrhosis,     Musculoskeletal     Abdominal    Substance History      OB/GYN          Other                        Anesthesia Plan    ASA 3     MAC     intravenous induction     Anesthetic plan, all risks, benefits, and alternatives have been provided, discussed and informed consent has been obtained with: patient.        CODE STATUS:

## 2022-03-23 NOTE — ANESTHESIA POSTPROCEDURE EVALUATION
"Patient: El Jiang    Procedure Summary     Date: 03/23/22 Room / Location: [facility] ENDOSCOPY 8 /  [facility]    Anesthesia Start: 0928 Anesthesia Stop: 0952    Procedure: ESOPHAGOGASTRODUODENOSCOPY WITH COLD BIOPSIES AND BANDING x 5 (N/A Esophagus) Diagnosis:       Secondary esophageal varices without bleeding (HCC)      Alcoholic cirrhosis of liver without ascites (HCC)      Erosive gastritis      Duodenitis      (Secondary esophageal varices without bleeding (HCC) [I85.10])      (Alcoholic cirrhosis of liver without ascites (HCC) [K70.30])    Surgeons: Erik Magaña MD Provider: Gerardo Felix MD    Anesthesia Type: MAC ASA Status: 3          Anesthesia Type: MAC    Vitals  Vitals Value Taken Time   /78 03/23/22 1010   Temp     Pulse 68 03/23/22 1010   Resp 16 03/23/22 1010   SpO2 95 % 03/23/22 1010           Post Anesthesia Care and Evaluation    Patient location during evaluation: bedside  Patient participation: complete - patient participated  Level of consciousness: sleepy but conscious  Pain score: 0  Pain management: adequate  Airway patency: patent  Anesthetic complications: No anesthetic complications    Cardiovascular status: acceptable  Respiratory status: acceptable  Hydration status: acceptable    Comments: /78 (BP Location: Left arm, Patient Position: Lying)   Pulse 68   Resp 16   Ht 175.3 cm (69\")   Wt 73 kg (161 lb)   SpO2 95%   BMI 23.78 kg/m²         "

## 2022-03-23 NOTE — BRIEF OP NOTE
ESOPHAGOGASTRODUODENOSCOPY  Progress Note    El Jiang  3/23/2022    Pre-op Diagnosis:   Secondary esophageal varices without bleeding (HCC) [I85.10]  Alcoholic cirrhosis of liver without ascites (HCC) [K70.30]       Post-Op Diagnosis Codes:     * Secondary esophageal varices without bleeding (HCC) [I85.10]     * Alcoholic cirrhosis of liver without ascites (HCC) [K70.30]     * Erosive gastritis [K29.60]     * Duodenitis [K29.80]    Procedure/CPT® Codes:        Procedure(s):  ESOPHAGOGASTRODUODENOSCOPY WITH COLD BIOPSIES AND BANDING x 5    Surgeon(s):  Erik Magaña MD    Anesthesia: Monitored Anesthesia Care    Staff:   Endo Technician: Yuriy Antony PCT  Endo Nurse: Radha Candelaria RN         Estimated Blood Loss: minimal    Urine Voided: * No values recorded between 3/23/2022  9:24 AM and 3/23/2022  9:47 AM *    Specimens:                Specimens     ID Source Type Tests Collected By Collected At Frozen?    A Gastric, Antrum Tissue · TISSUE PATHOLOGY EXAM   Erik Magaña MD 3/23/22 1309     Description: ANTRAL BIOPSIES                Drains: * No LDAs found *    Findings: EGD with biopsy and banding performed revealed duodenitis with erosive gastritis biopsies of the antrum were obtained.  Retroflexed the GE junction showed no evidence of gastric varices but positive esophageal varices with grade 3 varices approximately 6 mm across banded with 5 bands to good effect.  Minimal blood loss noted.    Complications: None          Erik Magaña MD     Date: 3/23/2022  Time: 09:47 EDT

## 2022-03-24 LAB
LAB AP CASE REPORT: NORMAL
PATH REPORT.FINAL DX SPEC: NORMAL
PATH REPORT.GROSS SPEC: NORMAL

## 2022-03-25 ENCOUNTER — TELEPHONE (OUTPATIENT)
Dept: GASTROENTEROLOGY | Facility: CLINIC | Age: 42
End: 2022-03-25

## 2022-03-25 DIAGNOSIS — I85.01 BLEEDING ESOPHAGEAL VARICES, UNSPECIFIED ESOPHAGEAL VARICES TYPE: Primary | ICD-10-CM

## 2022-03-25 NOTE — TELEPHONE ENCOUNTER
Returned phone call to patient's mother-in-law who is on the DAVIN.   She states the patient is experiencing more pain the usual after his esophageal banding.   She denies any bleeding.   Advised patient can eat anything cold and smooth. She states he is drinking protein shakes for nutrition.   Advised to add ice chips, popsicles, applesauce, throat lozengers, avoid any citric flavor.   Asked if the patient was taking his Sucralfate and Pantoprazole, she states yes.   Advised the patient's pain will improve as healing takes place. She states she will call back if he does have worsening of symptoms.

## 2022-03-25 NOTE — TELEPHONE ENCOUNTER
----- [facility] sent at 3/25/2022  9:58 AM EDT -----  Regarding: pt questions  Contact: [phone]  Pt's wife states the pt has been really sore and in pain since his egd.

## 2022-04-07 ENCOUNTER — TELEPHONE (OUTPATIENT)
Dept: GASTROENTEROLOGY | Facility: CLINIC | Age: 42
End: 2022-04-07

## 2022-04-07 NOTE — TELEPHONE ENCOUNTER
ALINA Burgos for EGD on  06/01/2022 arrive at 10:00am   . Mailed Prep instructions to Mailing address on-file.

## 2022-05-13 ENCOUNTER — LAB (OUTPATIENT)
Dept: OTHER | Facility: HOSPITAL | Age: 42
End: 2022-05-13

## 2022-05-13 DIAGNOSIS — D69.6 THROMBOCYTOPENIA: ICD-10-CM

## 2022-05-13 DIAGNOSIS — D64.9 ANEMIA, UNSPECIFIED TYPE: ICD-10-CM

## 2022-05-13 LAB
BASOPHILS # BLD AUTO: 0.04 10*3/MM3 (ref 0–0.2)
BASOPHILS NFR BLD AUTO: 1.1 % (ref 0–1.5)
DEPRECATED RDW RBC AUTO: 49.8 FL (ref 37–54)
EOSINOPHIL # BLD AUTO: 0.38 10*3/MM3 (ref 0–0.4)
EOSINOPHIL NFR BLD AUTO: 10.6 % (ref 0.3–6.2)
ERYTHROCYTE [DISTWIDTH] IN BLOOD BY AUTOMATED COUNT: 14.4 % (ref 12.3–15.4)
FERRITIN SERPL-MCNC: 217 NG/ML (ref 30–400)
HCT VFR BLD AUTO: 38.9 % (ref 37.5–51)
HGB BLD-MCNC: 13.2 G/DL (ref 13–17.7)
HGB RETIC QN AUTO: 36.6 PG (ref 29.8–36.1)
IMM GRANULOCYTES # BLD AUTO: 0.01 10*3/MM3 (ref 0–0.05)
IMM GRANULOCYTES NFR BLD AUTO: 0.3 % (ref 0–0.5)
IMM RETICS NFR: 6.6 % (ref 3–15.8)
IRON 24H UR-MRATE: 67 MCG/DL (ref 59–158)
IRON SATN MFR SERPL: 23 % (ref 20–50)
LYMPHOCYTES # BLD AUTO: 0.6 10*3/MM3 (ref 0.7–3.1)
LYMPHOCYTES NFR BLD AUTO: 16.7 % (ref 19.6–45.3)
MCH RBC QN AUTO: 31.9 PG (ref 26.6–33)
MCHC RBC AUTO-ENTMCNC: 33.9 G/DL (ref 31.5–35.7)
MCV RBC AUTO: 94 FL (ref 79–97)
MONOCYTES # BLD AUTO: 0.41 10*3/MM3 (ref 0.1–0.9)
MONOCYTES NFR BLD AUTO: 11.4 % (ref 5–12)
NEUTROPHILS NFR BLD AUTO: 2.15 10*3/MM3 (ref 1.7–7)
NEUTROPHILS NFR BLD AUTO: 59.9 % (ref 42.7–76)
NRBC BLD AUTO-RTO: 0 /100 WBC (ref 0–0.2)
PLATELET # BLD AUTO: 62 10*3/MM3 (ref 140–450)
PLATELET # BLD AUTO: 62 10*3/MM3 (ref 140–450)
PLATELETS.RETICULATED NFR BLD AUTO: 7 % (ref 0.9–6.5)
PMV BLD AUTO: 10.9 FL (ref 6–12)
RBC # BLD AUTO: 4.14 10*6/MM3 (ref 4.14–5.8)
RETICS # AUTO: 0.06 10*6/MM3 (ref 0.02–0.13)
RETICS/RBC NFR AUTO: 1.37 % (ref 0.7–1.9)
TIBC SERPL-MCNC: 297 MCG/DL (ref 298–536)
TRANSFERRIN SERPL-MCNC: 199 MG/DL (ref 200–360)
WBC NRBC COR # BLD: 3.59 10*3/MM3 (ref 3.4–10.8)

## 2022-05-13 PROCEDURE — 36415 COLL VENOUS BLD VENIPUNCTURE: CPT

## 2022-05-13 PROCEDURE — 82728 ASSAY OF FERRITIN: CPT | Performed by: INTERNAL MEDICINE

## 2022-05-13 PROCEDURE — 85046 RETICYTE/HGB CONCENTRATE: CPT | Performed by: INTERNAL MEDICINE

## 2022-05-13 PROCEDURE — 84466 ASSAY OF TRANSFERRIN: CPT | Performed by: INTERNAL MEDICINE

## 2022-05-13 PROCEDURE — 83540 ASSAY OF IRON: CPT | Performed by: INTERNAL MEDICINE

## 2022-05-13 PROCEDURE — 85025 COMPLETE CBC W/AUTO DIFF WBC: CPT | Performed by: INTERNAL MEDICINE

## 2022-05-13 PROCEDURE — 85055 RETICULATED PLATELET ASSAY: CPT | Performed by: INTERNAL MEDICINE

## 2022-05-20 ENCOUNTER — APPOINTMENT (OUTPATIENT)
Dept: OTHER | Facility: HOSPITAL | Age: 42
End: 2022-05-20

## 2022-05-20 ENCOUNTER — OFFICE VISIT (OUTPATIENT)
Dept: ONCOLOGY | Facility: CLINIC | Age: 42
End: 2022-05-20

## 2022-05-20 VITALS
SYSTOLIC BLOOD PRESSURE: 109 MMHG | TEMPERATURE: 96.6 F | OXYGEN SATURATION: 99 % | WEIGHT: 158.1 LBS | HEIGHT: 69 IN | HEART RATE: 57 BPM | RESPIRATION RATE: 16 BRPM | DIASTOLIC BLOOD PRESSURE: 71 MMHG | BODY MASS INDEX: 23.42 KG/M2

## 2022-05-20 DIAGNOSIS — D69.6 THROMBOCYTOPENIA: Primary | ICD-10-CM

## 2022-05-20 DIAGNOSIS — D64.9 ANEMIA, UNSPECIFIED TYPE: ICD-10-CM

## 2022-05-20 PROCEDURE — 99214 OFFICE O/P EST MOD 30 MIN: CPT | Performed by: INTERNAL MEDICINE

## 2022-05-20 NOTE — PROGRESS NOTES
"    .     REASON FOR FOLLOWUP : Thrombocytopenia, iron deficiency anemia, alcoholic cirrhosis    HISTORY OF PRESENT ILLNESS:  The patient is a 42 y.o. year old male  who is here for follow-up with the above-mentioned history.    No new issues.  No chest pain, SOA, bleeding.  Continues abstinence from alcohol.    Past Medical History:   Diagnosis Date   • Acquired stuttering     WIFE STATES HE CAN BE HARD TO UNDERSTAND.. \"[place] ACCENT AND STUTTERS\"   • Bleeding esophageal varices (HCC)    • Cirrhosis (HCC)     end-stage   • H/O PAF (paroxysmal atrial fibrillation) (CMS/HCC)    • Hepatosplenomegaly     secondary to cirrhosis   • History of alcohol abuse    • History of anemia    • History of ascites    • History of sepsis    • History of transfusion    • Hyperlipidemia      Past Surgical History:   Procedure Laterality Date   • ENDOSCOPY N/A 11/18/2020    Procedure: ESOPHAGOGASTRODUODENOSCOPY with esophageal banding x3;  Surgeon: Erik Magaña MD;  Location: [facility] ENDOSCOPY;  Service: Gastroenterology;  Laterality: N/A;  pre - cirrhosis of the liver and varices  post - gastritis, portal hypertensive gastropathy   • ENDOSCOPY N/A 3/23/2022    Procedure: ESOPHAGOGASTRODUODENOSCOPY WITH COLD BIOPSIES AND BANDING x 5;  Surgeon: Erik Magaña MD;  Location: [facility] ENDOSCOPY;  Service: Gastroenterology;  Laterality: N/A;  HISTORY OF VARICES  VARICES, EROSIVE GASTRITIS   • ENDOSCOPY W/ BANDING  2020    varices   • HERNIA REPAIR     • PARACENTESIS      900 cc fluid drained       MEDICATIONS    Current Outpatient Medications:   •  ferrous sulfate 324 (65 Fe) MG tablet delayed-release EC tablet, Take 324 mg by mouth Daily With Breakfast., Disp: , Rfl:   •  furosemide (LASIX) 40 MG tablet, Take 1 tablet by mouth Daily., Disp: 90 tablet, Rfl: 3  •  glucosamine-chondroitin 500-400 MG capsule capsule, Take  by mouth 3 (Three) Times a Day With Meals., Disp: , Rfl:   •  nadolol (CORGARD) 40 MG tablet, Take 1 tablet by " "mouth Daily., Disp: 90 tablet, Rfl: 3  •  pantoprazole (PROTONIX) 40 MG EC tablet, Take 1 tablet by mouth 2 (Two) Times a Day., Disp: 180 tablet, Rfl: 3  •  spironolactone (Aldactone) 25 MG tablet, Take 1 tablet by mouth Daily., Disp: 90 tablet, Rfl: 3  •  traZODone (DESYREL) 50 MG tablet, Take 50 mg by mouth Every Night., Disp: , Rfl:     ALLERGIES:   No Known Allergies    SOCIAL HISTORY:       Social History     Socioeconomic History   • Marital status:    Tobacco Use   • Smoking status: Current Every Day Smoker     Packs/day: 0.50     Types: Cigarettes   • Smokeless tobacco: Never Used   • Tobacco comment: quit 6 months ago   Vaping Use   • Vaping Use: Never used   Substance and Sexual Activity   • Alcohol use: Not Currently     Comment: H/O alcoholism (quit 08/2020)   • Drug use: Yes     Frequency: 1.0 times per week     Types: Marijuana   • Sexual activity: Defer         FAMILY HISTORY:  Family History   Problem Relation Age of Onset   • Ovarian cancer Mother    • Throat cancer Father    • Malig Hyperthermia Neg Hx        REVIEW OF SYSTEMS:  Review of Systems   Constitutional: Negative for activity change.   HENT: Negative for nosebleeds and trouble swallowing.    Respiratory: Negative for shortness of breath and wheezing.    Cardiovascular: Negative for chest pain and palpitations.   Gastrointestinal: Negative for constipation, diarrhea and nausea.   Genitourinary: Negative for dysuria and hematuria.   Musculoskeletal: Negative for arthralgias and myalgias.   Neurological: Negative for seizures and syncope.   Hematological: Negative for adenopathy. Does not bruise/bleed easily.   Psychiatric/Behavioral: Negative for confusion.              Vitals:    05/20/22 0852   BP: 109/71   Pulse: 57   Resp: 16   Temp: 96.6 °F (35.9 °C)   TempSrc: Temporal   SpO2: 99%   Weight: 71.7 kg (158 lb 1.6 oz)   Height: 175.3 cm (69.02\")   PainSc: 0-No pain  Comment: thrombocytopenia     Current Status 1/21/2022   ECOG " score 0      PHYSICAL EXAM:        CONSTITUTIONAL:  Vital signs reviewed.  No distress, looks comfortable.  EYES:  Conjunctiva and lids unremarkable.  PERRLA  EARS,NOSE,MOUTH,THROAT:  Ears and nose appear unremarkable.  Lips, teeth, gums appear unremarkable.  RESPIRATORY:  Normal respiratory effort.  Lungs clear to auscultation bilaterally.  CARDIOVASCULAR:  Normal S1, S2.  No murmurs rubs or gallops.  No significant lower extremity edema.  GASTROINTESTINAL: Abdomen appears unremarkable.  Nontender.  No hepatomegaly.  No splenomegaly.  LYMPHATIC:  No cervical, supraclavicular, axillary lymphadenopathy.  SKIN:  Warm.  No rashes.  PSYCHIATRIC:  Normal judgment and insight.  Normal mood and affect.           RECENT LABS:        WBC   Date Value Ref Range Status   05/13/2022 3.59 3.40 - 10.80 10*3/mm3 Final   04/20/2022 4.24 (L) 4.5 - 11.0 10*3/uL Final   01/19/2022 5.23 4.5 - 11.0 10*3/uL Final   01/14/2022 3.83 3.40 - 10.80 10*3/mm3 Final   10/20/2021 5.29 4.5 - 11.0 10*3/uL Final   10/01/2021 3.54 3.40 - 10.80 10*3/mm3 Final   09/28/2021 2.22 (L) 3.70 - 10.30 10*3/uL Final   09/02/2021 4.10 3.40 - 10.80 10*3/mm3 Final   04/09/2021 4.10 3.40 - 10.80 10*3/mm3 Final   02/12/2021 5.50 3.40 - 10.80 10*3/mm3 Final   12/11/2020 4.68 3.40 - 10.80 10*3/mm3 Final   10/27/2020 5.65 3.40 - 10.80 10*3/mm3 Final   10/09/2020 6.22 3.40 - 10.80 10*3/mm3 Final     Hemoglobin   Date Value Ref Range Status   05/13/2022 13.2 13.0 - 17.7 g/dL Final   04/20/2022 13.2 (L) 13.5 - 17.5 g/dL Final   01/19/2022 13.9 13.5 - 17.5 g/dL Final   01/14/2022 13.2 13.0 - 17.7 g/dL Final   10/20/2021 13.9 13.5 - 17.5 g/dL Final   10/01/2021 13.9 13.0 - 17.7 g/dL Final   09/28/2021 13.2 (L) 13.7 - 17.5 g/dL Final   09/02/2021 13.8 13.0 - 17.7 g/dL Final   04/09/2021 13.1 13.0 - 17.7 g/dL Final   02/12/2021 14.1 13.0 - 17.7 g/dL Final   12/11/2020 13.1 13.0 - 17.7 g/dL Final   10/27/2020 11.7 (L) 13.0 - 17.7 g/dL Final   10/09/2020 11.3 (L) 13.0 - 17.7  g/dL Final     Platelets   Date Value Ref Range Status   05/13/2022 62 (L) 140 - 450 10*3/mm3 Final   05/13/2022 62 (L) 140 - 450 10*3/mm3 Final   04/20/2022 54 (L) 140 - 440 10*3/uL Final   01/19/2022 71 (L) 140 - 440 10*3/uL Final   01/14/2022 71 (L) 140 - 450 10*3/mm3 Final   01/14/2022 71 (L) 140 - 450 10*3/mm3 Final     Comment:     Checked for clot; none detected   10/20/2021 65 (L) 140 - 440 10*3/uL Final   10/01/2021 57 (L) 140 - 450 10*3/mm3 Final   09/28/2021 46 (L) 155 - 369 10*3/uL Final   09/02/2021 73 (L) 140 - 450 10*3/mm3 Final   04/09/2021 74 (L) 140 - 450 10*3/mm3 Final   02/12/2021 76 (L) 140 - 450 10*3/mm3 Final   12/11/2020 81 (L) 140 - 450 10*3/mm3 Final   12/11/2020 81 (L) 140 - 450 10*3/mm3 Final   10/27/2020 89 (L) 140 - 450 10*3/mm3 Final   10/09/2020 92 (L) 140 - 450 10*3/mm3 Final   10/09/2020 95 (L) 140 - 450 10*3/mm3 Final       Assessment & Plan   Thrombocytopenia (HCC)  - Ferritin  - Iron Profile  - Retic With IRF & RET-He  - CBC & Differential  - Immature Platelet Fraction    Anemia, unspecified type  - Ferritin  - Iron Profile  - Retic With IRF & RET-He  - CBC & Differential  - Immature Platelet Fraction    El Jiang     *Thrombocytopenia, due to alcoholic cirrhosis  · Unfortunately, there is no treatment to improve thrombocytopenia due to alcoholic cirrhosis.  I encouraged continued alcohol cessation, to try to avoid worsening of thrombocytopenia.  · 10/8/2021: PLT 57.  PLT is gradually trending down.  I told him we would consider PLT transfusion if PLT <20.  · B12 and RBC folate unremarkable  · 1/19/2022: PLT 71  · 5/13/2022: Platelets 62, IPF 7    *Iron deficiency anemia.  · On 9/10/2020,  Hb 9.1, Ferritin 14.  9% saturation.  · Oral iron 1 tablet daily started 9/10/2020.  · On 10/9/2020, Hb 11.3 (improved with oral iron).  · On 12/11/2020, ferritin 102, 34% saturation, Hb 13.1  · Continue oral iron daily  · (Ongoing intermittent mild blood in stools)  · RBC folate checked  and normal  · 4/16/2021: Ferritin 132, 20% saturation, Hb 13.1  Continue oral iron daily  · 10/1/2021: Ferritin 223, 18% saturation, Hb 13.9  · 1/14/2022: Ferritin 213, 18% saturation, Hb 13.9  · 5/13/2022: Ferritin 217, 23% saturation    *Source of iron deficiency  · Grade 2 esophageal varices on EGD, Dr. Magaña, 11/18/2020.  Banded.  Moderate portal hypertensive gastropathy.    *Alcoholic cirrhosis  · He was told previously he needs a liver transplant.  · Sometime around August 2020, while in [place], hospitalization with banding of esophageal varices and 0.9 L ascitic fluid removed.  · Dr. Denis Magaña is following.  He is following clinically for possible future referral for transplant.  Patient states he was told he does not need a transplant now.    *Alcoholism  · Still no alcohol since 8/4/2020  Continues to be abstinent from alcohol.  I congratulated him on his continued success.    *Paroxysmal atrial fibrillation.    · Evaluated by Dr. Rupert Leroy on 10/13/2020.  Dr. Leroy stated although the patient's mother-in-law who is a retired nurse understands the patient had A. fib, Dr. Leroy states he could not to find anywhere in the records documentation of A. 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  1 week prior: Ferritin, iron panel, CBC, reticulate hemoglobin, IPF  · I do not expect his PLT count to improve.    Send copy this to  Dr. Adrian Ibrahim, PCP

## 2022-05-31 ENCOUNTER — LAB (OUTPATIENT)
Dept: LAB | Facility: HOSPITAL | Age: 42
End: 2022-05-31

## 2022-05-31 DIAGNOSIS — Z01.812 ENCOUNTER FOR PREOPERATIVE SCREENING LABORATORY TESTING FOR COVID-19 VIRUS: Primary | ICD-10-CM

## 2022-05-31 DIAGNOSIS — Z20.822 ENCOUNTER FOR PREOPERATIVE SCREENING LABORATORY TESTING FOR COVID-19 VIRUS: Primary | ICD-10-CM

## 2022-05-31 LAB — SARS-COV-2 ORF1AB RESP QL NAA+PROBE: NOT DETECTED

## 2022-05-31 PROCEDURE — U0004 COV-19 TEST NON-CDC HGH THRU: HCPCS

## 2022-05-31 PROCEDURE — C9803 HOPD COVID-19 SPEC COLLECT: HCPCS

## 2022-06-01 ENCOUNTER — HOSPITAL ENCOUNTER (OUTPATIENT)
Facility: HOSPITAL | Age: 42
Setting detail: HOSPITAL OUTPATIENT SURGERY
Discharge: HOME | End: 2022-06-01
Attending: INTERNAL MEDICINE | Admitting: INTERNAL MEDICINE

## 2022-06-01 ENCOUNTER — ANESTHESIA (OUTPATIENT)
Dept: GASTROENTEROLOGY | Facility: HOSPITAL | Age: 42
End: 2022-06-01

## 2022-06-01 ENCOUNTER — ANESTHESIA EVENT (OUTPATIENT)
Dept: GASTROENTEROLOGY | Facility: HOSPITAL | Age: 42
End: 2022-06-01

## 2022-06-01 VITALS
HEART RATE: 55 BPM | BODY MASS INDEX: 22.97 KG/M2 | RESPIRATION RATE: 16 BRPM | DIASTOLIC BLOOD PRESSURE: 61 MMHG | WEIGHT: 155.1 LBS | SYSTOLIC BLOOD PRESSURE: 104 MMHG | HEIGHT: 69 IN | OXYGEN SATURATION: 98 %

## 2022-06-01 PROCEDURE — S0260 H&P FOR SURGERY: HCPCS | Performed by: INTERNAL MEDICINE

## 2022-06-01 PROCEDURE — 43235 EGD DIAGNOSTIC BRUSH WASH: CPT | Performed by: INTERNAL MEDICINE

## 2022-06-01 PROCEDURE — 25010000002 PROPOFOL 10 MG/ML EMULSION: Performed by: ANESTHESIOLOGY

## 2022-06-01 RX ORDER — LIDOCAINE HYDROCHLORIDE 20 MG/ML
INJECTION, SOLUTION INFILTRATION; PERINEURAL AS NEEDED
Status: DISCONTINUED | OUTPATIENT
Start: 2022-06-01 | End: 2022-06-01 | Stop reason: SURG

## 2022-06-01 RX ORDER — PROPOFOL 10 MG/ML
VIAL (ML) INTRAVENOUS AS NEEDED
Status: DISCONTINUED | OUTPATIENT
Start: 2022-06-01 | End: 2022-06-01 | Stop reason: SURG

## 2022-06-01 RX ORDER — SODIUM CHLORIDE, SODIUM LACTATE, POTASSIUM CHLORIDE, CALCIUM CHLORIDE 600; 310; 30; 20 MG/100ML; MG/100ML; MG/100ML; MG/100ML
30 INJECTION, SOLUTION INTRAVENOUS CONTINUOUS
Status: DISCONTINUED | OUTPATIENT
Start: 2022-06-01 | End: 2022-06-01 | Stop reason: HOSPADM

## 2022-06-01 RX ADMIN — SODIUM CHLORIDE, POTASSIUM CHLORIDE, SODIUM LACTATE AND CALCIUM CHLORIDE 30 ML/HR: 600; 310; 30; 20 INJECTION, SOLUTION INTRAVENOUS at 10:40

## 2022-06-01 RX ADMIN — LIDOCAINE HYDROCHLORIDE 60 MG: 20 INJECTION, SOLUTION INFILTRATION; PERINEURAL at 11:02

## 2022-06-01 RX ADMIN — PROPOFOL 80 MG: 10 INJECTION, EMULSION INTRAVENOUS at 11:02

## 2022-06-01 RX ADMIN — PROPOFOL 100 MCG/KG/MIN: 10 INJECTION, EMULSION INTRAVENOUS at 11:04

## 2022-06-01 NOTE — ANESTHESIA POSTPROCEDURE EVALUATION
Patient: El Jiang    Procedure Summary     Date: 06/01/22 Room / Location:  [facility]    Anesthesia Start: 1058 Anesthesia Stop: 1119    Procedure: ESOPHAGOGASTRODUODENOSCOPY (N/A Esophagus) Diagnosis:       Bleeding esophageal varices, unspecified esophageal varices type (HCC)      Gastritis      (Bleeding esophageal varices, unspecified esophageal varices type (HCC) [I85.01])    Surgeons: Erik Magaña MD Provider: Angelita Magana MD    Anesthesia Type: MAC ASA Status: 3          Anesthesia Type: MAC    Vitals  No vitals data found for the desired time range.          Post Anesthesia Care and Evaluation    Patient location during evaluation: bedside  Patient participation: complete - patient participated  Level of consciousness: awake  Pain management: adequate  Airway patency: patent  Anesthetic complications: No anesthetic complications    Cardiovascular status: acceptable  Respiratory status: acceptable  Hydration status: acceptable

## 2022-06-01 NOTE — BRIEF OP NOTE
ESOPHAGOGASTRODUODENOSCOPY  Progress Note    El Jiang  6/1/2022    Pre-op Diagnosis:   Bleeding esophageal varices, unspecified esophageal varices type (HCC) [I85.01]       Post-Op Diagnosis Codes:     * Bleeding esophageal varices, unspecified esophageal varices type (HCC) [I85.01]     * Gastritis [K29.70]    Procedure/CPT® Codes:        Procedure(s):  ESOPHAGOGASTRODUODENOSCOPY    Surgeon(s):  Erik Magaña MD    Anesthesia: Monitored Anesthesia Care    Staff:   Endo Technician: Maura Silverio  Endo Nurse: Anabella Bhatia RN         Estimated Blood Loss: none    Urine Voided: * No values recorded between 6/1/2022 10:57 AM and 6/1/2022 11:10 AM *    Specimens:                None          Drains: * No LDAs found *    Findings: EGD performed revealed antral gastritis and scarring from previous banding no gross dilation of esophageal or gastric varices noted.  Patient tolerated well sent to recovery.        Complications: None          Erik Magaña MD     Date: 6/1/2022  Time: 11:11 EDT

## 2022-06-01 NOTE — DISCHARGE INSTRUCTIONS
For the next 24 hours patient needs to be with a responsible adult.    For 24 hours DO NOT drive, operate machinery, appliances, drink alcohol, make important decisions or sign legal documents.    Start with a light or bland diet and advance to regular diet as tolerated.    Follow recommendations on procedure report provided by your doctor.    Call Dr Magaña for problems [phone]    Problems may include but not limited to: large amounts of bleeding, trouble breathing, repeated vomiting, severe unrelieved pain, fever or chills.

## 2022-06-01 NOTE — ANESTHESIA PREPROCEDURE EVALUATION
Anesthesia Evaluation                  Airway   Mallampati: II  TM distance: >3 FB  Neck ROM: full  No difficulty expected  Dental - normal exam     Pulmonary - normal exam   Cardiovascular - normal exam    (+) dysrhythmias Paroxysmal Atrial Fib, hyperlipidemia,       Neuro/Psych  GI/Hepatic/Renal/Endo    (+)  GI bleeding , liver disease cirrhosis,     ROS Comment: varices    Musculoskeletal     Abdominal    Substance History      OB/GYN          Other                        Anesthesia Plan    ASA 3     MAC     intravenous induction     Anesthetic plan, all risks, benefits, and alternatives have been provided, discussed and informed consent has been obtained with: patient.        CODE STATUS:

## 2022-06-01 NOTE — H&P
"[facility]  Pre Procedure History & Physical    Chief Complaint:   History of varices    Subjective     HPI:   Patient 42-year-old male with history of alcohol-related cirrhosis with hepatosplenomegaly status post esophageal varices treatment.  Patient here for follow-up varices.    Past Medical History:   Past Medical History:   Diagnosis Date   • Acquired stuttering     WIFE STATES HE CAN BE HARD TO UNDERSTAND.. \"[place] ACCENT AND STUTTERS\"   • Bleeding esophageal varices (HCC)    • Cirrhosis (HCC)     end-stage   • H/O PAF (paroxysmal atrial fibrillation) (CMS/HCC)    • Hepatosplenomegaly     secondary to cirrhosis   • History of alcohol abuse    • History of anemia    • History of ascites    • History of sepsis    • History of transfusion    • Hyperlipidemia        Past Surgical History:  Past Surgical History:   Procedure Laterality Date   • ENDOSCOPY N/A 11/18/2020    Procedure: ESOPHAGOGASTRODUODENOSCOPY with esophageal banding x3;  Surgeon: Erik Magaña MD;  Location: [facility] ENDOSCOPY;  Service: Gastroenterology;  Laterality: N/A;  pre - cirrhosis of the liver and varices  post - gastritis, portal hypertensive gastropathy   • ENDOSCOPY N/A 3/23/2022    Procedure: ESOPHAGOGASTRODUODENOSCOPY WITH COLD BIOPSIES AND BANDING x 5;  Surgeon: Erik Magaña MD;  Location: [facility] ENDOSCOPY;  Service: Gastroenterology;  Laterality: N/A;  HISTORY OF VARICES  VARICES, EROSIVE GASTRITIS   • ENDOSCOPY W/ BANDING  2020    varices   • HERNIA REPAIR     • PARACENTESIS      900 cc fluid drained       Family History:  Family History   Problem Relation Age of Onset   • Ovarian cancer Mother    • Throat cancer Father    • Malig Hyperthermia Neg Hx        Social History:   reports that he has been smoking cigarettes. He has been smoking about 0.50 packs per day. He has never used smokeless tobacco. He reports previous alcohol use. He reports current drug use. Frequency: 1.00 time per week. " "Drug: Marijuana.    Medications:   Medications Prior to Admission   Medication Sig Dispense Refill Last Dose   • ferrous sulfate 324 (65 Fe) MG tablet delayed-release EC tablet Take 324 mg by mouth Daily With Breakfast.   5/31/2022 at Unknown time   • furosemide (LASIX) 40 MG tablet Take 1 tablet by mouth Daily. 90 tablet 3 5/31/2022 at Unknown time   • glucosamine-chondroitin 500-400 MG capsule capsule Take  by mouth 3 (Three) Times a Day With Meals.   5/31/2022 at Unknown time   • nadolol (CORGARD) 40 MG tablet Take 1 tablet by mouth Daily. 90 tablet 3 5/31/2022 at Unknown time   • pantoprazole (PROTONIX) 40 MG EC tablet Take 1 tablet by mouth 2 (Two) Times a Day. 180 tablet 3 5/31/2022 at Unknown time   • spironolactone (Aldactone) 25 MG tablet Take 1 tablet by mouth Daily. 90 tablet 3 5/31/2022 at Unknown time   • traZODone (DESYREL) 50 MG tablet Take 50 mg by mouth Every Night.   5/31/2022 at Unknown time       Allergies:  Patient has no known allergies.    ROS:    Pertinent items are noted in HPI     Objective     Blood pressure 102/70, pulse 57, resp. rate 15, height 175.3 cm (69\"), weight 70.4 kg (155 lb 1.6 oz), SpO2 97 %.    Physical Exam   Constitutional: Pt is oriented to person, place, and time and well-developed, well-nourished, and in no distress.   Mouth/Throat: Oropharynx is clear and moist.   Neck: Normal range of motion.   Cardiovascular: Normal rate, regular rhythm and normal heart sounds.    Pulmonary/Chest: Effort normal and breath sounds normal.   Abdominal: Soft. Nontender  Skin: Skin is warm and dry.   Psychiatric: Mood, memory, affect and judgment normal.     Assessment & Plan     Diagnosis:  Esophageal varices  Cirrhosis    Anticipated Surgical Procedure:  EGD    The risks, benefits, and alternatives of this procedure have been discussed with the patient or the responsible party- the patient understands and agrees to proceed.                                                          "

## 2022-06-06 RX ORDER — PANTOPRAZOLE SODIUM 40 MG/1
40 TABLET, DELAYED RELEASE ORAL 2 TIMES DAILY
Qty: 180 TABLET | Refills: 3 | Status: SHIPPED | OUTPATIENT
Start: 2022-06-06 | End: 2022-08-11 | Stop reason: SDUPTHER

## 2022-06-06 RX ORDER — SPIRONOLACTONE 25 MG/1
25 TABLET ORAL DAILY
Qty: 90 TABLET | Refills: 3 | Status: SHIPPED | OUTPATIENT
Start: 2022-06-06 | End: 2022-08-11 | Stop reason: SDUPTHER

## 2022-06-06 RX ORDER — FUROSEMIDE 40 MG/1
40 TABLET ORAL DAILY
Qty: 90 TABLET | Refills: 3 | Status: SHIPPED | OUTPATIENT
Start: 2022-06-06 | End: 2022-08-11 | Stop reason: SDUPTHER

## 2022-06-06 RX ORDER — NADOLOL 40 MG/1
40 TABLET ORAL DAILY
Qty: 90 TABLET | Refills: 3 | Status: SHIPPED | OUTPATIENT
Start: 2022-06-06 | End: 2022-08-11 | Stop reason: SDUPTHER

## 2022-08-12 RX ORDER — FUROSEMIDE 40 MG/1
40 TABLET ORAL DAILY
Qty: 90 TABLET | Refills: 3 | Status: SHIPPED | OUTPATIENT
Start: 2022-08-12 | End: 2022-10-21 | Stop reason: SDUPTHER

## 2022-08-12 RX ORDER — PANTOPRAZOLE SODIUM 40 MG/1
40 TABLET, DELAYED RELEASE ORAL 2 TIMES DAILY
Qty: 180 TABLET | Refills: 3 | Status: SHIPPED | OUTPATIENT
Start: 2022-08-12 | End: 2023-01-20 | Stop reason: SDUPTHER

## 2022-08-12 RX ORDER — SPIRONOLACTONE 25 MG/1
25 TABLET ORAL DAILY
Qty: 90 TABLET | Refills: 3 | Status: SHIPPED | OUTPATIENT
Start: 2022-08-12 | End: 2022-10-21 | Stop reason: SDUPTHER

## 2022-08-12 RX ORDER — NADOLOL 40 MG/1
40 TABLET ORAL DAILY
Qty: 90 TABLET | Refills: 3 | Status: SHIPPED | OUTPATIENT
Start: 2022-08-12 | End: 2022-10-21 | Stop reason: SDUPTHER

## 2022-09-08 ENCOUNTER — APPOINTMENT (OUTPATIENT)
Dept: OTHER | Facility: HOSPITAL | Age: 42
End: 2022-09-08

## 2022-10-25 RX ORDER — NADOLOL 40 MG/1
40 TABLET ORAL DAILY
Qty: 90 TABLET | Refills: 3 | Status: SHIPPED | OUTPATIENT
Start: 2022-10-25 | End: 2023-01-20 | Stop reason: SDUPTHER

## 2022-10-25 RX ORDER — SPIRONOLACTONE 25 MG/1
25 TABLET ORAL DAILY
Qty: 90 TABLET | Refills: 3 | Status: SHIPPED | OUTPATIENT
Start: 2022-10-25 | End: 2023-01-20 | Stop reason: SDUPTHER

## 2022-10-25 RX ORDER — FUROSEMIDE 40 MG/1
40 TABLET ORAL DAILY
Qty: 90 TABLET | Refills: 3 | Status: SHIPPED | OUTPATIENT
Start: 2022-10-25 | End: 2023-01-20 | Stop reason: SDUPTHER

## 2023-01-23 RX ORDER — SPIRONOLACTONE 25 MG/1
25 TABLET ORAL DAILY
Qty: 90 TABLET | Refills: 3 | Status: SHIPPED | OUTPATIENT
Start: 2023-01-23

## 2023-01-23 RX ORDER — NADOLOL 40 MG/1
40 TABLET ORAL DAILY
Qty: 90 TABLET | Refills: 3 | Status: SHIPPED | OUTPATIENT
Start: 2023-01-23

## 2023-01-23 RX ORDER — FUROSEMIDE 40 MG/1
40 TABLET ORAL DAILY
Qty: 90 TABLET | Refills: 3 | Status: SHIPPED | OUTPATIENT
Start: 2023-01-23

## 2023-01-23 RX ORDER — PANTOPRAZOLE SODIUM 40 MG/1
40 TABLET, DELAYED RELEASE ORAL 2 TIMES DAILY
Qty: 180 TABLET | Refills: 3 | Status: SHIPPED | OUTPATIENT
Start: 2023-01-23

## 2023-02-10 ENCOUNTER — TELEPHONE (OUTPATIENT)
Dept: GASTROENTEROLOGY | Facility: CLINIC | Age: 43
End: 2023-02-10
Payer: MEDICARE

## 2023-02-10 NOTE — TELEPHONE ENCOUNTER
Domo Banuelos:   I got a message on this patient saying he is having fatigue nausea and vomiting with diarrhea and possible jaundice.  He has liver cirrhosis if he is that sick he may want to come over here to [facility] ER to be evaluated.

## 2023-02-10 NOTE — TELEPHONE ENCOUNTER
Pt's wife called, states pt is has been having severe fatigue, vomiting, diarrhea x3 days. States pt has sallow skin tone. Denies blood in stool or emesis, denies fever,  Increase in abdominal girth and SOA.   Advised will send a msg to BIANCA Banuelos.     Ok leave msg on her phone or call her mother and leave msg.

## 2023-02-10 NOTE — TELEPHONE ENCOUNTER
Returned spouse's phone call. Advised as per Lakisha's note. She states she will take him to [facility] ER.

## 2023-02-12 ENCOUNTER — HOSPITAL ENCOUNTER (OUTPATIENT)
Facility: HOSPITAL | Age: 43
Setting detail: OBSERVATION
Discharge: HOME | End: 2023-02-13
Attending: EMERGENCY MEDICINE | Admitting: EMERGENCY MEDICINE
Payer: MEDICARE

## 2023-02-12 ENCOUNTER — APPOINTMENT (OUTPATIENT)
Dept: CT IMAGING | Facility: HOSPITAL | Age: 43
End: 2023-02-12
Payer: MEDICARE

## 2023-02-12 DIAGNOSIS — K74.60 HEPATIC CIRRHOSIS, UNSPECIFIED HEPATIC CIRRHOSIS TYPE, UNSPECIFIED WHETHER ASCITES PRESENT: ICD-10-CM

## 2023-02-12 DIAGNOSIS — R10.84 GENERALIZED ABDOMINAL PAIN: Primary | ICD-10-CM

## 2023-02-12 PROBLEM — R10.9 ABDOMINAL PAIN: Status: ACTIVE | Noted: 2023-02-12

## 2023-02-12 LAB
ALBUMIN SERPL-MCNC: 4 G/DL (ref 3.5–5.2)
ALBUMIN/GLOB SERPL: 1.3 G/DL
ALP SERPL-CCNC: 94 U/L (ref 39–117)
ALT SERPL W P-5'-P-CCNC: 25 U/L (ref 1–41)
ANION GAP SERPL CALCULATED.3IONS-SCNC: 10.2 MMOL/L (ref 5–15)
AST SERPL-CCNC: 28 U/L (ref 1–40)
BACTERIA UR QL AUTO: ABNORMAL /HPF
BASOPHILS # BLD AUTO: 0.03 10*3/MM3 (ref 0–0.2)
BASOPHILS NFR BLD AUTO: 0.5 % (ref 0–1.5)
BILIRUB SERPL-MCNC: 1.8 MG/DL (ref 0–1.2)
BILIRUB UR QL STRIP: ABNORMAL
BUN SERPL-MCNC: 16 MG/DL (ref 6–20)
BUN/CREAT SERPL: 18.8 (ref 7–25)
CALCIUM SPEC-SCNC: 9.4 MG/DL (ref 8.6–10.5)
CHLORIDE SERPL-SCNC: 102 MMOL/L (ref 98–107)
CLARITY UR: ABNORMAL
CO2 SERPL-SCNC: 27.8 MMOL/L (ref 22–29)
COLOR UR: ABNORMAL
CREAT SERPL-MCNC: 0.85 MG/DL (ref 0.76–1.27)
D-LACTATE SERPL-SCNC: 1.3 MMOL/L (ref 0.5–2)
DEPRECATED RDW RBC AUTO: 47.6 FL (ref 37–54)
EGFRCR SERPLBLD CKD-EPI 2021: 109.9 ML/MIN/1.73
EOSINOPHIL # BLD AUTO: 0.36 10*3/MM3 (ref 0–0.4)
EOSINOPHIL NFR BLD AUTO: 6.1 % (ref 0.3–6.2)
ERYTHROCYTE [DISTWIDTH] IN BLOOD BY AUTOMATED COUNT: 14.4 % (ref 12.3–15.4)
GLOBULIN UR ELPH-MCNC: 3 GM/DL
GLUCOSE SERPL-MCNC: 106 MG/DL (ref 65–99)
GLUCOSE UR STRIP-MCNC: NEGATIVE MG/DL
HCT VFR BLD AUTO: 43.5 % (ref 37.5–51)
HGB BLD-MCNC: 14.6 G/DL (ref 13–17.7)
HGB UR QL STRIP.AUTO: NEGATIVE
HYALINE CASTS UR QL AUTO: ABNORMAL /LPF
INR PPP: 1.44 (ref 0.9–1.1)
KETONES UR QL STRIP: ABNORMAL
LEUKOCYTE ESTERASE UR QL STRIP.AUTO: ABNORMAL
LIPASE SERPL-CCNC: 68 U/L (ref 13–60)
LYMPHOCYTES # BLD AUTO: 0.61 10*3/MM3 (ref 0.7–3.1)
LYMPHOCYTES NFR BLD AUTO: 10.3 % (ref 19.6–45.3)
MAGNESIUM SERPL-MCNC: 1.7 MG/DL (ref 1.6–2.6)
MCH RBC QN AUTO: 30.3 PG (ref 26.6–33)
MCHC RBC AUTO-ENTMCNC: 33.6 G/DL (ref 31.5–35.7)
MCV RBC AUTO: 90.2 FL (ref 79–97)
MONOCYTES # BLD AUTO: 0.9 10*3/MM3 (ref 0.1–0.9)
MONOCYTES NFR BLD AUTO: 15.2 % (ref 5–12)
NEUTROPHILS NFR BLD AUTO: 3.99 10*3/MM3 (ref 1.7–7)
NEUTROPHILS NFR BLD AUTO: 67.6 % (ref 42.7–76)
NITRITE UR QL STRIP: NEGATIVE
PH UR STRIP.AUTO: 5.5 [PH] (ref 5–8)
PLATELET # BLD AUTO: 77 10*3/MM3 (ref 140–450)
PMV BLD AUTO: 11.8 FL (ref 6–12)
POTASSIUM SERPL-SCNC: 3.3 MMOL/L (ref 3.5–5.2)
PROCALCITONIN SERPL-MCNC: 0.33 NG/ML (ref 0–0.25)
PROT SERPL-MCNC: 7 G/DL (ref 6–8.5)
PROT UR QL STRIP: ABNORMAL
PROTHROMBIN TIME: 17.7 SECONDS (ref 11.7–14.2)
RBC # BLD AUTO: 4.82 10*6/MM3 (ref 4.14–5.8)
RBC # UR STRIP: ABNORMAL /HPF
REF LAB TEST METHOD: ABNORMAL
SODIUM SERPL-SCNC: 140 MMOL/L (ref 136–145)
SP GR UR STRIP: >=1.03 (ref 1–1.03)
SQUAMOUS #/AREA URNS HPF: ABNORMAL /HPF
UROBILINOGEN UR QL STRIP: ABNORMAL
WBC # UR STRIP: ABNORMAL /HPF
WBC NRBC COR # BLD: 5.91 10*3/MM3 (ref 3.4–10.8)

## 2023-02-12 PROCEDURE — 25010000002 ONDANSETRON PER 1 MG: Performed by: EMERGENCY MEDICINE

## 2023-02-12 PROCEDURE — 96375 TX/PRO/DX INJ NEW DRUG ADDON: CPT

## 2023-02-12 PROCEDURE — 83735 ASSAY OF MAGNESIUM: CPT | Performed by: NURSE PRACTITIONER

## 2023-02-12 PROCEDURE — 84145 PROCALCITONIN (PCT): CPT | Performed by: NURSE PRACTITIONER

## 2023-02-12 PROCEDURE — G0378 HOSPITAL OBSERVATION PER HR: HCPCS

## 2023-02-12 PROCEDURE — 25010000002 MORPHINE PER 10 MG: Performed by: EMERGENCY MEDICINE

## 2023-02-12 PROCEDURE — 25010000002 MORPHINE PER 10 MG: Performed by: NURSE PRACTITIONER

## 2023-02-12 PROCEDURE — 25010000002 DIPHENHYDRAMINE PER 50 MG

## 2023-02-12 PROCEDURE — 80053 COMPREHEN METABOLIC PANEL: CPT | Performed by: PHYSICIAN ASSISTANT

## 2023-02-12 PROCEDURE — 85025 COMPLETE CBC W/AUTO DIFF WBC: CPT | Performed by: PHYSICIAN ASSISTANT

## 2023-02-12 PROCEDURE — 99285 EMERGENCY DEPT VISIT HI MDM: CPT

## 2023-02-12 PROCEDURE — 96374 THER/PROPH/DIAG INJ IV PUSH: CPT

## 2023-02-12 PROCEDURE — 81001 URINALYSIS AUTO W/SCOPE: CPT | Performed by: PHYSICIAN ASSISTANT

## 2023-02-12 PROCEDURE — 83690 ASSAY OF LIPASE: CPT | Performed by: PHYSICIAN ASSISTANT

## 2023-02-12 PROCEDURE — 96361 HYDRATE IV INFUSION ADD-ON: CPT

## 2023-02-12 PROCEDURE — 85610 PROTHROMBIN TIME: CPT | Performed by: NURSE PRACTITIONER

## 2023-02-12 PROCEDURE — 96376 TX/PRO/DX INJ SAME DRUG ADON: CPT

## 2023-02-12 PROCEDURE — 74176 CT ABD & PELVIS W/O CONTRAST: CPT

## 2023-02-12 PROCEDURE — 83605 ASSAY OF LACTIC ACID: CPT | Performed by: NURSE PRACTITIONER

## 2023-02-12 RX ORDER — SODIUM CHLORIDE 0.9 % (FLUSH) 0.9 %
10 SYRINGE (ML) INJECTION EVERY 12 HOURS SCHEDULED
Status: DISCONTINUED | OUTPATIENT
Start: 2023-02-12 | End: 2023-02-13 | Stop reason: HOSPADM

## 2023-02-12 RX ORDER — SODIUM CHLORIDE 0.9 % (FLUSH) 0.9 %
10 SYRINGE (ML) INJECTION AS NEEDED
Status: DISCONTINUED | OUTPATIENT
Start: 2023-02-12 | End: 2023-02-13 | Stop reason: HOSPADM

## 2023-02-12 RX ORDER — MULTIPLE VITAMINS W/ MINERALS TAB 9MG-400MCG
1 TAB ORAL DAILY
COMMUNITY

## 2023-02-12 RX ORDER — HYDROCODONE BITARTRATE AND ACETAMINOPHEN 7.5; 325 MG/1; MG/1
1 TABLET ORAL ONCE
Status: COMPLETED | OUTPATIENT
Start: 2023-02-12 | End: 2023-02-12

## 2023-02-12 RX ORDER — TRAZODONE HYDROCHLORIDE 50 MG/1
50 TABLET ORAL NIGHTLY PRN
Status: DISCONTINUED | OUTPATIENT
Start: 2023-02-12 | End: 2023-02-13 | Stop reason: HOSPADM

## 2023-02-12 RX ORDER — HYDROCODONE BITARTRATE AND ACETAMINOPHEN 5; 325 MG/1; MG/1
1 TABLET ORAL EVERY 6 HOURS PRN
Status: DISCONTINUED | OUTPATIENT
Start: 2023-02-12 | End: 2023-02-12

## 2023-02-12 RX ORDER — PANTOPRAZOLE SODIUM 40 MG/1
40 TABLET, DELAYED RELEASE ORAL 2 TIMES DAILY
Status: DISCONTINUED | OUTPATIENT
Start: 2023-02-12 | End: 2023-02-13 | Stop reason: HOSPADM

## 2023-02-12 RX ORDER — ONDANSETRON 4 MG/1
4 TABLET, FILM COATED ORAL EVERY 6 HOURS PRN
Status: DISCONTINUED | OUTPATIENT
Start: 2023-02-12 | End: 2023-02-13 | Stop reason: HOSPADM

## 2023-02-12 RX ORDER — POTASSIUM CHLORIDE 7.45 MG/ML
10 INJECTION INTRAVENOUS
Status: DISCONTINUED | OUTPATIENT
Start: 2023-02-12 | End: 2023-02-12

## 2023-02-12 RX ORDER — NADOLOL 40 MG/1
40 TABLET ORAL DAILY
Status: DISCONTINUED | OUTPATIENT
Start: 2023-02-12 | End: 2023-02-13 | Stop reason: HOSPADM

## 2023-02-12 RX ORDER — ACETAMINOPHEN 325 MG/1
650 TABLET ORAL EVERY 4 HOURS PRN
Status: DISCONTINUED | OUTPATIENT
Start: 2023-02-12 | End: 2023-02-13 | Stop reason: HOSPADM

## 2023-02-12 RX ORDER — FUROSEMIDE 40 MG/1
40 TABLET ORAL DAILY
Status: DISCONTINUED | OUTPATIENT
Start: 2023-02-12 | End: 2023-02-13 | Stop reason: HOSPADM

## 2023-02-12 RX ORDER — DIPHENHYDRAMINE HYDROCHLORIDE 50 MG/ML
25 INJECTION INTRAMUSCULAR; INTRAVENOUS EVERY 6 HOURS PRN
Status: DISCONTINUED | OUTPATIENT
Start: 2023-02-12 | End: 2023-02-13 | Stop reason: HOSPADM

## 2023-02-12 RX ORDER — SODIUM CHLORIDE 9 MG/ML
40 INJECTION, SOLUTION INTRAVENOUS AS NEEDED
Status: DISCONTINUED | OUTPATIENT
Start: 2023-02-12 | End: 2023-02-13 | Stop reason: HOSPADM

## 2023-02-12 RX ORDER — SODIUM CHLORIDE, SODIUM LACTATE, POTASSIUM CHLORIDE, CALCIUM CHLORIDE 600; 310; 30; 20 MG/100ML; MG/100ML; MG/100ML; MG/100ML
100 INJECTION, SOLUTION INTRAVENOUS CONTINUOUS
Status: DISCONTINUED | OUTPATIENT
Start: 2023-02-12 | End: 2023-02-13 | Stop reason: HOSPADM

## 2023-02-12 RX ORDER — ONDANSETRON 2 MG/ML
4 INJECTION INTRAMUSCULAR; INTRAVENOUS EVERY 6 HOURS PRN
Status: DISCONTINUED | OUTPATIENT
Start: 2023-02-12 | End: 2023-02-13 | Stop reason: HOSPADM

## 2023-02-12 RX ORDER — POTASSIUM CHLORIDE 1.5 G/1.77G
40 POWDER, FOR SOLUTION ORAL AS NEEDED
Status: DISCONTINUED | OUTPATIENT
Start: 2023-02-12 | End: 2023-02-13 | Stop reason: HOSPADM

## 2023-02-12 RX ORDER — SPIRONOLACTONE 25 MG/1
25 TABLET ORAL DAILY
Status: DISCONTINUED | OUTPATIENT
Start: 2023-02-12 | End: 2023-02-13 | Stop reason: HOSPADM

## 2023-02-12 RX ORDER — POTASSIUM CHLORIDE 750 MG/1
40 TABLET, FILM COATED, EXTENDED RELEASE ORAL AS NEEDED
Status: DISCONTINUED | OUTPATIENT
Start: 2023-02-12 | End: 2023-02-13 | Stop reason: HOSPADM

## 2023-02-12 RX ORDER — HYDROCODONE BITARTRATE AND ACETAMINOPHEN 7.5; 325 MG/1; MG/1
1 TABLET ORAL EVERY 6 HOURS PRN
Status: DISCONTINUED | OUTPATIENT
Start: 2023-02-12 | End: 2023-02-13 | Stop reason: HOSPADM

## 2023-02-12 RX ORDER — MORPHINE SULFATE 2 MG/ML
4 INJECTION, SOLUTION INTRAMUSCULAR; INTRAVENOUS ONCE
Status: COMPLETED | OUTPATIENT
Start: 2023-02-12 | End: 2023-02-12

## 2023-02-12 RX ORDER — ONDANSETRON 2 MG/ML
4 INJECTION INTRAMUSCULAR; INTRAVENOUS ONCE
Status: COMPLETED | OUTPATIENT
Start: 2023-02-12 | End: 2023-02-12

## 2023-02-12 RX ADMIN — POTASSIUM CHLORIDE 40 MEQ: 750 TABLET, EXTENDED RELEASE ORAL at 20:20

## 2023-02-12 RX ADMIN — Medication 10 ML: at 20:21

## 2023-02-12 RX ADMIN — MORPHINE SULFATE 4 MG: 2 INJECTION, SOLUTION INTRAMUSCULAR; INTRAVENOUS at 18:25

## 2023-02-12 RX ADMIN — SODIUM CHLORIDE, POTASSIUM CHLORIDE, SODIUM LACTATE AND CALCIUM CHLORIDE 100 ML/HR: 600; 310; 30; 20 INJECTION, SOLUTION INTRAVENOUS at 20:21

## 2023-02-12 RX ADMIN — PANTOPRAZOLE SODIUM 40 MG: 40 TABLET, DELAYED RELEASE ORAL at 20:20

## 2023-02-12 RX ADMIN — TRAZODONE HYDROCHLORIDE 50 MG: 50 TABLET ORAL at 23:39

## 2023-02-12 RX ADMIN — POTASSIUM CHLORIDE 40 MEQ: 750 TABLET, EXTENDED RELEASE ORAL at 16:42

## 2023-02-12 RX ADMIN — HYDROCODONE BITARTRATE AND ACETAMINOPHEN 1 TABLET: 7.5; 325 TABLET ORAL at 23:39

## 2023-02-12 RX ADMIN — HYDROCODONE BITARTRATE AND ACETAMINOPHEN 1 TABLET: 7.5; 325 TABLET ORAL at 17:11

## 2023-02-12 RX ADMIN — MORPHINE SULFATE 4 MG: 2 INJECTION, SOLUTION INTRAMUSCULAR; INTRAVENOUS at 12:31

## 2023-02-12 RX ADMIN — ONDANSETRON 4 MG: 2 INJECTION INTRAMUSCULAR; INTRAVENOUS at 12:31

## 2023-02-12 RX ADMIN — DIPHENHYDRAMINE HYDROCHLORIDE 25 MG: 50 INJECTION, SOLUTION INTRAMUSCULAR; INTRAVENOUS at 21:18

## 2023-02-12 NOTE — ED TRIAGE NOTES
.Pt masked on arrival, staff masked    Pt reports abd pain for several days, improved and then returned today, worse to luq and radiates around to back

## 2023-02-12 NOTE — H&P
[facility]   HISTORY AND PHYSICAL    Patient Name: El Jiang  : 1980  MRN: [id number]  Primary Care Physician:  Adrian Ibrahim MD  Date of admission: 2023    Subjective   Subjective     Chief Complaint:   Chief Complaint   Patient presents with   • Abdominal Pain         HPI:    El Jiang is a pleasant afebrile ambulatory 42 y.o.  male with a past medical history of alcohol cirrhosis, esophageal varices, alcohol abuse 3 years in remission, paroxysmal atrial fibrillation not on anticoagulation, and ascites chronic liver disease.    Patient presents to the emergency department [facility] today with complaint of abdominal pain.  He has been admitted to the observation unit for further testing and evaluation.    Patient endorses diffuse lower abdominal discomfort that has been constant since Wednesday.  He endorses nausea, vomiting and diarrhea.  States he is having 10-12 stools daily without any use of laxatives or lactulose.  His stools are watery and brown in color without evidence of melena or blood.  His emesis is yellow in color without any evidence of coffee-grounds.  Patient denies any known history of infectious diarrhea C. difficile.  He denies any recent antibiotic usage.  He has a known history of alcoholic cirrhosis and reports he has been abstinent for 30 years and follows with Dr. Magaña as his gastroenterologist.    In the emergency department a CT abdomen pelvis was obtained which shows gallbladder stones with possible pericholecystic fluid.  The ED provider spoke with Dr. Silvestre on-call for general surgery service who recommends HIDA scan but low suspicion of acute cholecystitis based on patient's lower abdominal discomfort presentation.    Review of Systems   All systems were reviewed and negative except for: Abdominal pain, nausea vomiting diarrhea    Personal History     Past Medical History:   Diagnosis Date   • Acquired stuttering     WIFE  "STATES HE CAN BE HARD TO UNDERSTAND.. \"[employer]\"   • Bleeding esophageal varices (HCC)    • Cirrhosis (HCC)     end-stage   • H/O PAF (paroxysmal atrial fibrillation) (CMS/HCC)    • Hepatosplenomegaly     secondary to cirrhosis   • History of alcohol abuse    • History of anemia    • History of ascites    • History of sepsis    • History of transfusion    • Hyperlipidemia        Past Surgical History:   Procedure Laterality Date   • ENDOSCOPY N/A 11/18/2020    Procedure: ESOPHAGOGASTRODUODENOSCOPY with esophageal banding x3;  Surgeon: Erik Magaña MD;  Location: [facility] ENDOSCOPY;  Service: Gastroenterology;  Laterality: N/A;  pre - cirrhosis of the liver and varices  post - gastritis, portal hypertensive gastropathy   • ENDOSCOPY N/A 03/23/2022    Procedure: ESOPHAGOGASTRODUODENOSCOPY WITH COLD BIOPSIES AND BANDING x 5;  Surgeon: Erik Magaña MD;  Location: [facility] ENDOSCOPY;  Service: Gastroenterology;  Laterality: N/A;  HISTORY OF VARICES  VARICES, EROSIVE GASTRITIS   • ENDOSCOPY N/A 06/01/2022    Procedure: ESOPHAGOGASTRODUODENOSCOPY;  Surgeon: Erik Magaña MD;  Location: [facility] ENDOSCOPY;  Service: Gastroenterology;  Laterality: N/A;  pre-hx varices  post-gastritis   • ENDOSCOPY W/ BANDING  2020    varices   • ENDOSCOPY W/ BANDING     • HERNIA REPAIR     • PARACENTESIS      900 cc fluid drained       Family History: family history includes Ovarian cancer in his mother; Throat cancer in his father. Otherwise pertinent FHx was reviewed and not pertinent to current issue.    Social History:  reports that he has been smoking cigarettes. He has been smoking an average of .5 packs per day. He has never used smokeless tobacco. He reports that he does not currently use alcohol. He reports current drug use. Frequency: 1.00 time per week. Drug: Marijuana.    Home Medications:  ferrous sulfate, furosemide, glucosamine-chondroitin, nadolol, pantoprazole, spironolactone, and " traZODone    Allergies:  No Known Allergies    Objective   Objective     Vitals:   Temp:  [97.5 °F (36.4 °C)] 97.5 °F (36.4 °C)  Heart Rate:  [62-77] 64  Resp:  [18] 18  BP: (102-115)/(64-83) 105/68  Physical Exam    Constitutional: Awake, alert, chronically ill-appearing, thin   Eyes: PERRLA, sclerae anicteric, no conjunctival injection   HENT: NCAT, mucous membranes moist   Neck: Supple, no thyromegaly, no lymphadenopathy, trachea midline   Respiratory: Clear to auscultation bilaterally, nonlabored respirations    Cardiovascular: RRR, no murmurs, rubs, or gallops, palpable pedal pulses bilaterally   Gastrointestinal: Positive bowel sounds, soft, mild right left lower quadrant abdominal tenderness to palpation without rebound or guarding, nondistended   Musculoskeletal: No bilateral ankle edema, no clubbing or cyanosis to extremities   Psychiatric: Appropriate affect, cooperative   Neurologic: Oriented x 3, strength symmetric in all extremities, Cranial Nerves grossly intact to confrontation, speech clear   Skin: No rashes, mild generalized pallor    Result Review    Result Review:  I have personally reviewed the results from the time of this admission to 2/12/2023 14:19 EST and agree with these findings:  [x]  Laboratory list / accordion  []  Microbiology  [x]  Radiology  []  EKG/Telemetry   []  Cardiology/Vascular   []  Pathology  []  Old records  []  Other:  Most notable findings include: Blood glucose 106, potassium 3.3, lipase 68, CT abdomen pelvis shows cholelithiasis without any acute inflammatory bowel process    Assessment & Plan   Assessment / Plan     Brief Patient Summary:  El Jiang is a 42 y.o. male who is being evaluated for diffuse lower abdominal discomfort that has been constant over the last 4 days without any fevers or chills.  Known liver disease but not on any medications that would induce diarrhea.  Plan to check stool studies including C. difficile although patient CPM pelvis does not  show any evidence of colitis certainly this is on the differential.  Plan to consult general surgery as patient CT abdomen pelvis shows possible cholecystitis changes but he has no right upper quadrant abdominal pain and his serum bilirubin is stable secondary to his liver cirrhosis.    Active Hospital Problems:  Active Hospital Problems    Diagnosis    • **Abdominal pain      Plan:     Abdominal pain  Consulted general surgery  HIDA scan pending  Stool studies pending  CT abdomen pelvis shows cholelithiasis with gallbladder wall thickening and/or pericholecystic fluid without acute inflammatory process of bowel, cirrhotic liver splenomegaly  Consult to general surgery    Hypokalemia  Magnesium level pending  Electrolyte replacement protocol initiated  Trend with a.m. labs    History of liver cirrhosis  Continue Lasix and spironolactone    DVT prophylaxis:  Mechanical DVT prophylaxis orders are present.    CODE STATUS:    Level Of Support Discussed With: Patient  Code Status (Patient has no pulse and is not breathing): CPR (Attempt to Resuscitate)  Medical Interventions (Patient has pulse or is breathing): Full Support    Admission Status:  I believe this patient meets observation status.    Electronically signed by LAN Mak, 02/12/23, 2:19 PM EST.       I have worn appropriate PPE during this patient encounter, sanitized my hands both with entering and exiting patient's room.    78 minutes has been spent by [facility] providers in the care of this patient while under observation status

## 2023-02-12 NOTE — PLAN OF CARE
Goal Outcome Evaluation:  Plan of Care Reviewed With: patient        Progress: no change  Outcome Evaluation: Patient is alert and oriented. Patient admitted to observation for abd pain. Pain medication given. On room air. Potassium replaced. Will continue to monitor for changes.

## 2023-02-12 NOTE — ED PROVIDER NOTES
EMERGENCY DEPARTMENT ENCOUNTER    Room Number:  112/1  Date seen:  2/12/2023  PCP: Adrian Ibrahim MD  Discussed/ obtained information from independent historians: patient and wife      HPI:  Chief Complaint: lower abdominal pain  A complete HPI/ROS/PMH/PSH/SH/FH are unobtainable due to: none  Context: El Jiang is a 42 y.o. male with a history of alcohol induced cirrhosis who presents to the ED c/o 4-day history of constant waxing and waning lower abdominal pain that occasionally radiates to the low back.  He rates it an 8 out of 10 currently, states he thinks it is exacerbated by acidic foods and also notices it more when he is smoking.  Tends to spontaneously improve at times, has not really noted anything in particular that makes it better.  He has had some nausea with one episode of vomiting.  Denies any hematuria dysuria or urinary frequency, has had several episodes of diarrhea daily for the past few days.  Denies fever chest pain shortness of breath.  He denies melena, hematemesis hematochezia abdominal distention.  No changes in mental status.  He is abstinent from alcohol, is approaching 3 years of sobriety.  He has had previous upper endoscopies, no history of colonoscopy.    External (non-ED) record review: Office visit with [facility] on 11/16/2022.  History of decompensated alcohol-related cirrhosis with ascites and esophageal variceal bleeding.  Alcohol use disorder in remission, hemoglobin was stable at 13.9.      PAST MEDICAL HISTORY  Active Ambulatory Problems     Diagnosis Date Noted   • Thrombocytopenia (HCC) 10/09/2020   • Anemia 10/09/2020   • PAF (paroxysmal atrial fibrillation) (HCC)    • Cirrhosis (HCC)    • Bleeding esophageal varices (HCC)    • History of alcohol abuse    • Tobacco dependence    • Alcoholic cirrhosis of liver with ascites (HCC) 10/27/2020   • Secondary esophageal varices without bleeding (HCC) 10/27/2020     Resolved Ambulatory Problems      Diagnosis Date Noted   • No Resolved Ambulatory Problems     Past Medical History:   Diagnosis Date   • Acquired stuttering    • Hepatosplenomegaly    • History of anemia    • History of ascites    • History of sepsis    • History of transfusion    • Hyperlipidemia          PAST SURGICAL HISTORY  Past Surgical History:   Procedure Laterality Date   • ENDOSCOPY N/A 11/18/2020    Procedure: ESOPHAGOGASTRODUODENOSCOPY with esophageal banding x3;  Surgeon: Erik Magaña MD;  Location: [facility] ENDOSCOPY;  Service: Gastroenterology;  Laterality: N/A;  pre - cirrhosis of the liver and varices  post - gastritis, portal hypertensive gastropathy   • ENDOSCOPY N/A 03/23/2022    Procedure: ESOPHAGOGASTRODUODENOSCOPY WITH COLD BIOPSIES AND BANDING x 5;  Surgeon: Erik Magaña MD;  Location: [facility] ENDOSCOPY;  Service: Gastroenterology;  Laterality: N/A;  HISTORY OF VARICES  VARICES, EROSIVE GASTRITIS   • ENDOSCOPY N/A 06/01/2022    Procedure: ESOPHAGOGASTRODUODENOSCOPY;  Surgeon: Erik Magaña MD;  Location: [facility] ENDOSCOPY;  Service: Gastroenterology;  Laterality: N/A;  pre-hx varices  post-gastritis   • ENDOSCOPY W/ BANDING  2020    varices   • ENDOSCOPY W/ BANDING     • HERNIA REPAIR     • PARACENTESIS      900 cc fluid drained         FAMILY HISTORY  Family History   Problem Relation Age of Onset   • Ovarian cancer Mother    • Throat cancer Father    • Malig Hyperthermia Neg Hx          SOCIAL HISTORY  Social History     Socioeconomic History   • Marital status:    Tobacco Use   • Smoking status: Every Day     Packs/day: 0.50     Types: Cigarettes   • Smokeless tobacco: Never   • Tobacco comments:     quit 6 months ago   Vaping Use   • Vaping Use: Never used   Substance and Sexual Activity   • Alcohol use: Not Currently     Comment: H/O alcoholism (quit 08/2020)   • Drug use: Yes     Frequency: 1.0 times per week     Types: Marijuana   • Sexual activity: Defer         ALLERGIES  Patient has no  known allergies.        REVIEW OF SYSTEMS  Review of Systems         PHYSICAL EXAM  ED Triage Vitals   Temp Heart Rate Resp BP SpO2   02/12/23 1132 02/12/23 1132 02/12/23 1132 02/12/23 1154 02/12/23 1132   97.5 °F (36.4 °C) 77 18 107/78 99 %      Temp src Heart Rate Source Patient Position BP Location FiO2 (%)   -- 02/12/23 1154 02/12/23 1154 02/12/23 1154 --    Monitor Sitting Left arm        Physical Exam      GENERAL: no acute distress  HENT: normocephalic, atraumatic  EYES: no scleral icterus  CV: regular rhythm, normal rate  RESPIRATORY: normal effort CTA B  ABDOMEN: nondistended, soft, mild diffuse lower abdominal tenderness, no guarding or rigidity, no CVA tenderness.  MUSCULOSKELETAL: no deformity  NEURO: alert, moves all extremities, follows commands  PSYCH:  calm, cooperative  SKIN: warm, dry    Vital signs and nursing notes reviewed.          LAB RESULTS  Recent Results (from the past 24 hour(s))   Comprehensive Metabolic Panel    Collection Time: 02/12/23 11:57 AM    Specimen: Blood   Result Value Ref Range    Glucose 106 (H) 65 - 99 mg/dL    BUN 16 6 - 20 mg/dL    Creatinine 0.85 0.76 - 1.27 mg/dL    Sodium 140 136 - 145 mmol/L    Potassium 3.3 (L) 3.5 - 5.2 mmol/L    Chloride 102 98 - 107 mmol/L    CO2 27.8 22.0 - 29.0 mmol/L    Calcium 9.4 8.6 - 10.5 mg/dL    Total Protein 7.0 6.0 - 8.5 g/dL    Albumin 4.0 3.5 - 5.2 g/dL    ALT (SGPT) 25 1 - 41 U/L    AST (SGOT) 28 1 - 40 U/L    Alkaline Phosphatase 94 39 - 117 U/L    Total Bilirubin 1.8 (H) 0.0 - 1.2 mg/dL    Globulin 3.0 gm/dL    A/G Ratio 1.3 g/dL    BUN/Creatinine Ratio 18.8 7.0 - 25.0    Anion Gap 10.2 5.0 - 15.0 mmol/L    eGFR 109.9 >60.0 mL/min/1.73   Lipase    Collection Time: 02/12/23 11:57 AM    Specimen: Blood   Result Value Ref Range    Lipase 68 (H) 13 - 60 U/L   CBC Auto Differential    Collection Time: 02/12/23 11:57 AM    Specimen: Blood   Result Value Ref Range    WBC 5.91 3.40 - 10.80 10*3/mm3    RBC 4.82 4.14 - 5.80 10*6/mm3     Hemoglobin 14.6 13.0 - 17.7 g/dL    Hematocrit 43.5 37.5 - 51.0 %    MCV 90.2 79.0 - 97.0 fL    MCH 30.3 26.6 - 33.0 pg    MCHC 33.6 31.5 - 35.7 g/dL    RDW 14.4 12.3 - 15.4 %    RDW-SD 47.6 37.0 - 54.0 fl    MPV 11.8 6.0 - 12.0 fL    Platelets 77 (L) 140 - 450 10*3/mm3    Neutrophil % 67.6 42.7 - 76.0 %    Lymphocyte % 10.3 (L) 19.6 - 45.3 %    Monocyte % 15.2 (H) 5.0 - 12.0 %    Eosinophil % 6.1 0.3 - 6.2 %    Basophil % 0.5 0.0 - 1.5 %    Neutrophils, Absolute 3.99 1.70 - 7.00 10*3/mm3    Lymphocytes, Absolute 0.61 (L) 0.70 - 3.10 10*3/mm3    Monocytes, Absolute 0.90 0.10 - 0.90 10*3/mm3    Eosinophils, Absolute 0.36 0.00 - 0.40 10*3/mm3    Basophils, Absolute 0.03 0.00 - 0.20 10*3/mm3   Procalcitonin    Collection Time: 02/12/23 11:57 AM    Specimen: Blood   Result Value Ref Range    Procalcitonin 0.33 (H) 0.00 - 0.25 ng/mL   Magnesium    Collection Time: 02/12/23 11:57 AM    Specimen: Blood   Result Value Ref Range    Magnesium 1.7 1.6 - 2.6 mg/dL   Urinalysis With Microscopic If Indicated (No Culture) - Urine, Clean Catch    Collection Time: 02/12/23 12:14 PM    Specimen: Urine, Clean Catch   Result Value Ref Range    Color, UA Dark Yellow (A) Yellow, Straw    Appearance, UA Cloudy (A) Clear    pH, UA 5.5 5.0 - 8.0    Specific Gravity, UA >=1.030 1.005 - 1.030    Glucose, UA Negative Negative    Ketones, UA Trace (A) Negative    Bilirubin, UA Small (1+) (A) Negative    Blood, UA Negative Negative    Protein, UA Trace (A) Negative    Leuk Esterase, UA Trace (A) Negative    Nitrite, UA Negative Negative    Urobilinogen, UA 1.0 E.U./dL 0.2 - 1.0 E.U./dL   Urinalysis, Microscopic Only - Urine, Clean Catch    Collection Time: 02/12/23 12:14 PM    Specimen: Urine, Clean Catch   Result Value Ref Range    RBC, UA None Seen None Seen, 0-2 /HPF    WBC, UA 6-12 (A) None Seen, 0-2 /HPF    Bacteria, UA 1+ (A) None Seen /HPF    Squamous Epithelial Cells, UA 3-6 (A) None Seen, 0-2 /HPF    Hyaline Casts, UA 21-30 None Seen  /LPF    Methodology Manual Light Microscopy    Lactic Acid, Plasma    Collection Time: 02/12/23  4:12 PM    Specimen: Arm, Left; Blood   Result Value Ref Range    Lactate 1.3 0.5 - 2.0 mmol/L   Protime-INR    Collection Time: 02/12/23  4:20 PM    Specimen: Hand, Right; Blood   Result Value Ref Range    Protime 17.7 (H) 11.7 - 14.2 Seconds    INR 1.44 (H) 0.90 - 1.10       Ordered the above labs and reviewed the results.        RADIOLOGY  CT Abdomen Pelvis Without Contrast    Result Date: 2/12/2023  CT ABDOMEN PELVIS WO CONTRAST-  INDICATIONS: Pain  TECHNIQUE: Radiation dose reduction techniques were utilized, including automated exposure control and exposure modulation based on body size. Unenhanced ABDOMEN AND PELVIS CT  COMPARISON: Correlated with MRI from 05/07/2021  FINDINGS:  Gallstones are seen in the gallbladder, and there appears to be gallbladder wall thickening and/or pericholecystic fluid that may represent acute cholecystitis; edema could also contribute to this appearance. If further imaging evaluation is indicated, ultrasound or hepatobiliary scintigraphy could be considered.  A nodular contour of the liver is noted, compatible with cirrhosis. Attenuation of the liver is heterogeneous. The spleen is enlarged, 19.0 cm CC.  Otherwise unremarkable unenhanced appearance of the liver, spleen, adrenal glands, pancreas, kidneys, bladder.  No bowel obstruction or abnormal bowel thickening is identified. Assessment for inflammatory changes of bowel is limited by diffuse mesenteric edema.  No free intraperitoneal gas or free fluid. Ventral hernias of fat are noted.  Scattered small mesenteric and para-aortic lymph nodes are seen that are not significant by size criteria.  Abdominal aorta is not aneurysmal. Aortic and other arterial calcifications are present.  The lung bases are clear.  Degenerative changes are seen in the spine. No acute fracture is identified.          1. Cholelithiasis with findings that may  be evidence of acute cholecystitis, correlate clinically. 2. No acute inflammatory process of bowel is identified, follow up as indications persist. 3. No urolithiasis or hydronephrosis. 4. Cirrhotic liver, splenomegaly. Ventral hernias of fat.  This report was finalized on 2/12/2023 12:56 PM by Dr. Oscar Martinez M.D.        Ordered the above noted radiological studies. Reviewed by me in PACS.            PROCEDURES  Procedures              MEDICATIONS GIVEN IN ER  Medications   sodium chloride 0.9 % flush 10 mL (10 mL Intravenous Not Given 2/12/23 1429)   sodium chloride 0.9 % flush 10 mL (has no administration in time range)   sodium chloride 0.9 % infusion 40 mL (has no administration in time range)   acetaminophen (TYLENOL) tablet 650 mg (has no administration in time range)   ondansetron (ZOFRAN) tablet 4 mg (has no administration in time range)     Or   ondansetron (ZOFRAN) injection 4 mg (has no administration in time range)   influenza vac split quad (FLUZONE,FLUARIX,AFLURIA,FLULAVAL) injection 0.5 mL (has no administration in time range)   furosemide (LASIX) tablet 40 mg (40 mg Oral Not Given 2/12/23 1603)   spironolactone (ALDACTONE) tablet 25 mg (25 mg Oral Not Given 2/12/23 1604)   traZODone (DESYREL) tablet 50 mg (has no administration in time range)   pantoprazole (PROTONIX) EC tablet 40 mg (has no administration in time range)   nadolol (CORGARD) tablet 40 mg (40 mg Oral Not Given 2/12/23 1603)   potassium chloride (K-DUR,KLOR-CON) ER tablet 40 mEq (40 mEq Oral Given 2/12/23 1642)   potassium chloride (KLOR-CON) packet 40 mEq (has no administration in time range)   ondansetron (ZOFRAN) injection 4 mg (4 mg Intravenous Given 2/12/23 1231)   morphine injection 4 mg (4 mg Intravenous Given 2/12/23 1231)   HYDROcodone-acetaminophen (NORCO) 7.5-325 MG per tablet 1 tablet (1 tablet Oral Given 2/12/23 1711)   morphine injection 4 mg (4 mg Intravenous Given 2/12/23 1825)                   MEDICAL DECISION  MAKING, PROGRESS, and CONSULTS    All labs have been independently reviewed by me.  All radiology studies have been reviewed by me and I have also reviewed the radiology report.   EKG's independently viewed and interpreted by me.  Discussion below represents my analysis of pertinent findings related to patient's condition, differential diagnosis, treatment plan and final disposition.      Additional sources:    - Chronic or social conditions impacting care: cirrhosis    - Shared decision making: I offered the patient admission secondary to gallbladder findings and alternate explanation not found for pain in the setting of significant comorbidity of cirrhosis.  He accepts offer for admission and further evaluation and treatment.      Orders placed during this visit:  Orders Placed This Encounter   Procedures   • Gastrointestinal Panel, PCR - Stool, Per Rectum   • Clostridioides difficile Toxin - Stool, Per Rectum   • Clostridioides difficile Toxin, PCR - Stool, Per Rectum   • CT Abdomen Pelvis Without Contrast   • NM HIDA SCAN WITH PHARMACOLOGICAL INTERVENTION   • Comprehensive Metabolic Panel   • Lipase   • Urinalysis With Microscopic If Indicated (No Culture) - Urine, Clean Catch   • CBC Auto Differential   • Urinalysis, Microscopic Only - Urine, Clean Catch   • CBC Auto Differential   • Comprehensive Metabolic Panel   • Protime-INR   • Lipase   • Procalcitonin   • Potassium   • Magnesium   • Lactic Acid, Plasma   • NPO Diet NPO Type: Sips with Meds   • Diet: Liquid Diets; Full Liquid; Texture: Regular Texture (IDDSI 7); Fluid Consistency: Thin (IDDSI 0)   • Cardiac Monitoring   • Vital Signs   • Up With Assistance   • Intake & Output   • Weigh Patient   • Oral Care   • Saline Lock & Maintain IV Access   • Place Sequential Compression Device   • Maintain Sequential Compression Device   • Patient Currently On Electrolyte Replacement Protocol - Please Refer to MAR for Protocol Details  Misc Nursing Order (Specify)    • Opioid Administration - Continuous Pulse Oximetry (SpO2) Monitoring   • Opioid Administration - Document SpO2 Value With Each Set of Vitals & Any Change in Patient Status   • Opioid Administration - Notify Provider Pulse Oximetry (SpO2)   • Code Status and Medical Interventions:   • Surgery (on-call MD unless specified)   • Inpatient General Surgery Consult   • Patient Isolation Contact Spore   • Oxygen Therapy- Nasal Cannula; Titrate for SPO2: 90% - 95%   • Insert Peripheral IV   • Initiate ED Observation Status   • CBC & Differential             Differential diagnosis:  Differential diagnosis includes but is not limited to:  - hepatobiliary pathology such as cholecystitis, cholangitis, and symptomatic cholelithiasis  - Pancreatitis  - Dyspepsia  - Small bowel obstruction  - Appendicitis  - Diverticulitis  - UTI including pyelonephritis  - Ureteral stone  - Zoster  - Colitis, including infectious and ischemic  - Atypical ACS        Independent interpretation of labs, radiology studies, and discussions with consultants:  ED Course as of 02/12/23 1953   Sun Feb 12, 2023   1243 Lipase(!): 68 [TD]   1243 Creatinine: 0.85 [TD]   1243 WBC: 5.91 [TD]   1243 Hemoglobin: 14.6 [TD]   1244 CT scan of the abdomen and pelvis interpreted by myself.  My findings include cirrhotic appearing liver.  Pericholecystic fluid with gallstones. [TD]   1403 I discussed patient with Dr. Silvestre, general surgery.  We reviewed the patient's history presentation labs, she has reviewed his imaging.  Based on his presentation, gallbladder findings most significant with edema related to cirrhosis and not acute cholecystitis, very low suspicion for that at this time.  If patient needs to be admitted, could get HIDA scan and she will consult. [KA]   1417 I discussed all lab and imaging results with the patient and family.  No alternative explanation for day [KA]   1417 There is no specific alternative explanation for his symptoms at this time,  gallbladder findings may be related to chronic cirrhosis or an acute problem.  They agreeable with the plan for admission for further evaluation. [KA]   1417 I discussed the patient with LAN Campbell for the ED observation unit who agrees to admit the patient on behalf of Dr. Hdez for further evaluation and treatment. [KA]      ED Course User Index  [KA] Elma Joseph PA  [TD] Max Mcneill II, MD             Patient was wearing a face mask when I entered the room and they continued to wear a mask throughout their stay in the ED.  I wore PPE, including  gloves, face mask with shield or face mask with goggles whenever I was in the room with patient.     DIAGNOSIS  Final diagnoses:   Generalized abdominal pain   Hepatic cirrhosis, unspecified hepatic cirrhosis type, unspecified whether ascites present (HCC)       Latest Documented Vital Signs:  As of 19:53 EST  BP- 96/66 HR- 59 Temp- 97.6 °F (36.4 °C) (Oral) O2 sat- 96%              --    Please note that portions of this were completed with a voice recognition program.       Note Disclaimer: At [facility], we believe that sharing information builds trust and better relationships. You are receiving this note because you are receiving care at [facility] or recently visited. It is possible you will see health information before a provider has talked with you about it. This kind of information can be easy to misunderstand. To help you fully understand what it means for your health, we urge you to discuss this note with your provider.           Elma Joseph PA  02/12/23 1953

## 2023-02-12 NOTE — ED NOTES
"Nursing report ED to floor  El Jiang  42 y.o.  male    HPI :   Chief Complaint   Patient presents with    Abdominal Pain       Admitting doctor:   Leandro Hdez MD    Admitting diagnosis:   There were no encounter diagnoses.    Code status:   Current Code Status       Date Active Code Status Order ID Comments User Context       2/12/2023 1410 CPR (Attempt to Resuscitate) [id number]  [facility]        Question Answer    Code Status (Patient has no pulse and is not breathing) CPR (Attempt to Resuscitate)    Medical Interventions (Patient has pulse or is breathing) Full Support    Level Of Support Discussed With Patient                    Allergies:   Patient has no known allergies.    Isolation:   No active isolations    Intake and Output  No intake or output data in the 24 hours ending 02/12/23 1411    Weight:       02/12/23  1154   Weight: 70.3 kg (155 lb)       Most recent vitals:   Vitals:    02/12/23 1154 02/12/23 1158 02/12/23 1201 02/12/23 1230   BP: 107/78 110/75 115/83 112/64   BP Location: Left arm      Patient Position: Sitting      Pulse: 64 75 62    Resp: 18      Temp:       SpO2: 99% 98% 99%    Weight: 70.3 kg (155 lb)      Height: 175.3 cm (69\")          Active LDAs/IV Access:   Lines, Drains & Airways       Active LDAs       Name Placement date Placement time Site Days    Peripheral IV 02/12/23 1146 Right Antecubital 02/12/23  1146  Antecubital  less than 1                    Labs (abnormal labs have a star):   Labs Reviewed   COMPREHENSIVE METABOLIC PANEL - Abnormal; Notable for the following components:       Result Value    Glucose 106 (*)     Potassium 3.3 (*)     Total Bilirubin 1.8 (*)     All other components within normal limits    Narrative:     GFR Normal >60  Chronic Kidney Disease <60  Kidney Failure <15     LIPASE - Abnormal; Notable for the following components:    Lipase 68 (*)     All other components within normal limits   URINALYSIS W/ MICROSCOPIC IF INDICATED (NO " CULTURE) - Abnormal; Notable for the following components:    Color, UA Dark Yellow (*)     Appearance, UA Cloudy (*)     Ketones, UA Trace (*)     Bilirubin, UA Small (1+) (*)     Protein, UA Trace (*)     Leuk Esterase, UA Trace (*)     All other components within normal limits   CBC WITH AUTO DIFFERENTIAL - Abnormal; Notable for the following components:    Platelets 77 (*)     Lymphocyte % 10.3 (*)     Monocyte % 15.2 (*)     Lymphocytes, Absolute 0.61 (*)     All other components within normal limits   URINALYSIS, MICROSCOPIC ONLY - Abnormal; Notable for the following components:    WBC, UA 6-12 (*)     Bacteria, UA 1+ (*)     Squamous Epithelial Cells, UA 3-6 (*)     All other components within normal limits   CBC AND DIFFERENTIAL    Narrative:     The following orders were created for panel order CBC & Differential.  Procedure                               Abnormality         Status                     ---------                               -----------         ------                     CBC Auto Differential[[id number]]        Abnormal            Final result                 Please view results for these tests on the individual orders.       EKG:   No orders to display       Meds given in ED:   Medications   sodium chloride 0.9 % flush 10 mL (has no administration in time range)   sodium chloride 0.9 % flush 10 mL (has no administration in time range)   sodium chloride 0.9 % infusion 40 mL (has no administration in time range)   acetaminophen (TYLENOL) tablet 650 mg (has no administration in time range)   ondansetron (ZOFRAN) tablet 4 mg (has no administration in time range)     Or   ondansetron (ZOFRAN) injection 4 mg (has no administration in time range)   ondansetron (ZOFRAN) injection 4 mg (4 mg Intravenous Given 2/12/23 1231)   morphine injection 4 mg (4 mg Intravenous Given 2/12/23 1231)       Imaging results:  CT Abdomen Pelvis Without Contrast    Result Date: 2/12/2023    1. Cholelithiasis with  findings that may be evidence of acute cholecystitis, correlate clinically. 2. No acute inflammatory process of bowel is identified, follow up as indications persist. 3. No urolithiasis or hydronephrosis. 4. Cirrhotic liver, splenomegaly. Ventral hernias of fat.  This report was finalized on 2/12/2023 12:56 PM by Dr. Oscar Martinez M.D.       Ambulatory status:   Partial assist    Social issues:   Social History     Socioeconomic History    Marital status:    Tobacco Use    Smoking status: Every Day     Packs/day: 0.50     Types: Cigarettes    Smokeless tobacco: Never    Tobacco comments:     quit 6 months ago   Vaping Use    Vaping Use: Never used   Substance and Sexual Activity    Alcohol use: Not Currently     Comment: H/O alcoholism (quit 08/2020)    Drug use: Yes     Frequency: 1.0 times per week     Types: Marijuana    Sexual activity: Defer       NIH Stroke Scale:         Mary Carmen Ocampo RN  02/12/23 14:11 EST

## 2023-02-13 ENCOUNTER — APPOINTMENT (OUTPATIENT)
Dept: NUCLEAR MEDICINE | Facility: HOSPITAL | Age: 43
End: 2023-02-13
Payer: MEDICARE

## 2023-02-13 VITALS
RESPIRATION RATE: 18 BRPM | SYSTOLIC BLOOD PRESSURE: 122 MMHG | OXYGEN SATURATION: 95 % | TEMPERATURE: 97 F | DIASTOLIC BLOOD PRESSURE: 92 MMHG | WEIGHT: 144.5 LBS | BODY MASS INDEX: 21.4 KG/M2 | HEIGHT: 69 IN | HEART RATE: 71 BPM

## 2023-02-13 LAB
ALBUMIN SERPL-MCNC: 3.3 G/DL (ref 3.5–5.2)
ALBUMIN/GLOB SERPL: 1.3 G/DL
ALP SERPL-CCNC: 77 U/L (ref 39–117)
ALT SERPL W P-5'-P-CCNC: 20 U/L (ref 1–41)
ANION GAP SERPL CALCULATED.3IONS-SCNC: 5.8 MMOL/L (ref 5–15)
AST SERPL-CCNC: 25 U/L (ref 1–40)
BASOPHILS # BLD MANUAL: 0.04 10*3/MM3 (ref 0–0.2)
BASOPHILS NFR BLD MANUAL: 1.2 % (ref 0–1.5)
BILIRUB SERPL-MCNC: 1.4 MG/DL (ref 0–1.2)
BUN SERPL-MCNC: 14 MG/DL (ref 6–20)
BUN/CREAT SERPL: 21.5 (ref 7–25)
CALCIUM SPEC-SCNC: 8.3 MG/DL (ref 8.6–10.5)
CHLORIDE SERPL-SCNC: 104 MMOL/L (ref 98–107)
CO2 SERPL-SCNC: 24.2 MMOL/L (ref 22–29)
CREAT SERPL-MCNC: 0.65 MG/DL (ref 0.76–1.27)
DACRYOCYTES BLD QL SMEAR: ABNORMAL
DEPRECATED RDW RBC AUTO: 46.7 FL (ref 37–54)
EGFRCR SERPLBLD CKD-EPI 2021: 119.2 ML/MIN/1.73
EOSINOPHIL # BLD MANUAL: 0.26 10*3/MM3 (ref 0–0.4)
EOSINOPHIL NFR BLD MANUAL: 7 % (ref 0.3–6.2)
ERYTHROCYTE [DISTWIDTH] IN BLOOD BY AUTOMATED COUNT: 14.3 % (ref 12.3–15.4)
GLOBULIN UR ELPH-MCNC: 2.5 GM/DL
GLUCOSE SERPL-MCNC: 111 MG/DL (ref 65–99)
HCT VFR BLD AUTO: 36 % (ref 37.5–51)
HGB BLD-MCNC: 12.3 G/DL (ref 13–17.7)
LIPASE SERPL-CCNC: 48 U/L (ref 13–60)
LYMPHOCYTES # BLD MANUAL: 0.52 10*3/MM3 (ref 0.7–3.1)
LYMPHOCYTES NFR BLD MANUAL: 11.6 % (ref 5–12)
MCH RBC QN AUTO: 30.5 PG (ref 26.6–33)
MCHC RBC AUTO-ENTMCNC: 34.2 G/DL (ref 31.5–35.7)
MCV RBC AUTO: 89.3 FL (ref 79–97)
MONOCYTES # BLD: 0.43 10*3/MM3 (ref 0.1–0.9)
NEUTROPHILS # BLD AUTO: 2.47 10*3/MM3 (ref 1.7–7)
NEUTROPHILS NFR BLD MANUAL: 66.3 % (ref 42.7–76)
OVALOCYTES BLD QL SMEAR: ABNORMAL
PLAT MORPH BLD: NORMAL
PLATELET # BLD AUTO: 57 10*3/MM3 (ref 140–450)
PMV BLD AUTO: 11.5 FL (ref 6–12)
POIKILOCYTOSIS BLD QL SMEAR: ABNORMAL
POTASSIUM SERPL-SCNC: 4.2 MMOL/L (ref 3.5–5.2)
PROT SERPL-MCNC: 5.8 G/DL (ref 6–8.5)
RBC # BLD AUTO: 4.03 10*6/MM3 (ref 4.14–5.8)
SMUDGE CELLS BLD QL SMEAR: ABNORMAL
SODIUM SERPL-SCNC: 134 MMOL/L (ref 136–145)
VARIANT LYMPHS NFR BLD MANUAL: 14 % (ref 19.6–45.3)
WBC NRBC COR # BLD: 3.72 10*3/MM3 (ref 3.4–10.8)

## 2023-02-13 PROCEDURE — 85025 COMPLETE CBC W/AUTO DIFF WBC: CPT | Performed by: NURSE PRACTITIONER

## 2023-02-13 PROCEDURE — 0 TECHNETIUM TC 99M MEBROFENIN KIT: Performed by: EMERGENCY MEDICINE

## 2023-02-13 PROCEDURE — 96361 HYDRATE IV INFUSION ADD-ON: CPT

## 2023-02-13 PROCEDURE — 85007 BL SMEAR W/DIFF WBC COUNT: CPT | Performed by: NURSE PRACTITIONER

## 2023-02-13 PROCEDURE — G0378 HOSPITAL OBSERVATION PER HR: HCPCS

## 2023-02-13 PROCEDURE — 80053 COMPREHEN METABOLIC PANEL: CPT | Performed by: NURSE PRACTITIONER

## 2023-02-13 PROCEDURE — 25010000002 INFLUENZA VAC SPLIT QUAD 0.5 ML SUSPENSION PREFILLED SYRINGE: Performed by: NURSE PRACTITIONER

## 2023-02-13 PROCEDURE — 83690 ASSAY OF LIPASE: CPT | Performed by: NURSE PRACTITIONER

## 2023-02-13 PROCEDURE — 25010000002 SINCALIDE PER 5 MCG: Performed by: EMERGENCY MEDICINE

## 2023-02-13 PROCEDURE — A9537 TC99M MEBROFENIN: HCPCS | Performed by: EMERGENCY MEDICINE

## 2023-02-13 PROCEDURE — 78227 HEPATOBIL SYST IMAGE W/DRUG: CPT

## 2023-02-13 PROCEDURE — 99213 OFFICE O/P EST LOW 20 MIN: CPT | Performed by: PHYSICIAN ASSISTANT

## 2023-02-13 PROCEDURE — G0008 ADMIN INFLUENZA VIRUS VAC: HCPCS | Performed by: NURSE PRACTITIONER

## 2023-02-13 PROCEDURE — 90686 IIV4 VACC NO PRSV 0.5 ML IM: CPT | Performed by: NURSE PRACTITIONER

## 2023-02-13 RX ORDER — NICOTINE 21 MG/24HR
1 PATCH, TRANSDERMAL 24 HOURS TRANSDERMAL
Status: DISCONTINUED | OUTPATIENT
Start: 2023-02-13 | End: 2023-02-13 | Stop reason: HOSPADM

## 2023-02-13 RX ORDER — ONDANSETRON 4 MG/1
4 TABLET, FILM COATED ORAL EVERY 6 HOURS PRN
Qty: 30 TABLET | Refills: 1 | Status: SHIPPED | OUTPATIENT
Start: 2023-02-13

## 2023-02-13 RX ORDER — KIT FOR THE PREPARATION OF TECHNETIUM TC 99M MEBROFENIN 45 MG/10ML
1 INJECTION, POWDER, LYOPHILIZED, FOR SOLUTION INTRAVENOUS
Status: COMPLETED | OUTPATIENT
Start: 2023-02-13 | End: 2023-02-13

## 2023-02-13 RX ORDER — TRAZODONE HYDROCHLORIDE 50 MG/1
50 TABLET ORAL NIGHTLY PRN
Qty: 30 TABLET | Refills: 0 | Status: SHIPPED | OUTPATIENT
Start: 2023-02-13

## 2023-02-13 RX ADMIN — INFLUENZA VIRUS VACCINE 0.5 ML: 15; 15; 15; 15 SUSPENSION INTRAMUSCULAR at 14:00

## 2023-02-13 RX ADMIN — SODIUM CHLORIDE, POTASSIUM CHLORIDE, SODIUM LACTATE AND CALCIUM CHLORIDE 100 ML/HR: 600; 310; 30; 20 INJECTION, SOLUTION INTRAVENOUS at 05:11

## 2023-02-13 RX ADMIN — MEBROFENIN 1 DOSE: 45 INJECTION, POWDER, LYOPHILIZED, FOR SOLUTION INTRAVENOUS at 06:15

## 2023-02-13 RX ADMIN — PANTOPRAZOLE SODIUM 40 MG: 40 TABLET, DELAYED RELEASE ORAL at 09:35

## 2023-02-13 RX ADMIN — SODIUM CHLORIDE 1.3 MCG: 9 INJECTION, SOLUTION INTRAVENOUS at 07:29

## 2023-02-13 RX ADMIN — Medication 10 ML: at 09:28

## 2023-02-13 NOTE — DISCHARGE INSTRUCTIONS
Continue home medication as prescribed.  Continue established follow-up with GI on 3/8/2023  Follow-up with your primary care doctor in 1 to 2 weeks.    Return to the emergency department with worsening symptoms, uncontrolled pain, inability to tolerate oral liquids, fever greater than 101°F not controlled by Tylenol or as needed with emergent concerns.

## 2023-02-13 NOTE — PLAN OF CARE
Goal Outcome Evaluation:  Plan of Care Reviewed With: patient  Patient is alert and oriented. Patient being discharged home. AVS and discharge instructions given to patient. Patient to follow up with Gastroenterology and primary care. Patient is agreeable with plan. Questions and concerns addressed. Patient discharge home with family via private vehicle. Prescriptions sent to patient's preferred pharmacy of choice.    no

## 2023-02-13 NOTE — ED PROVIDER NOTES
MD ATTESTATION NOTE    The GENO and I have discussed this patient's history, physical exam, and treatment plan.    I provided a substantive portion of the care of this patient. I personally performed the physical exam, in its entirety. The attached note describes my personal findings.      El Jiang is a 42 y.o. male who presents to the ED c/o abdominal pain for the past 4 days.  Pain is bilateral and at the level of the umbilicus.      On exam:  GENERAL: not distressed  HENT: nares patent  EYES: no scleral icterus  CV: regular rhythm, regular rate  RESPIRATORY: normal effort  ABDOMEN: soft, generalized abdominal tenderness, no rebound or guarding  MUSCULOSKELETAL: no deformity  NEURO: alert, moves all extremities, follows commands  SKIN: warm, dry    Labs  Recent Results (from the past 24 hour(s))   Comprehensive Metabolic Panel    Collection Time: 02/12/23 11:57 AM    Specimen: Blood   Result Value Ref Range    Glucose 106 (H) 65 - 99 mg/dL    BUN 16 6 - 20 mg/dL    Creatinine 0.85 0.76 - 1.27 mg/dL    Sodium 140 136 - 145 mmol/L    Potassium 3.3 (L) 3.5 - 5.2 mmol/L    Chloride 102 98 - 107 mmol/L    CO2 27.8 22.0 - 29.0 mmol/L    Calcium 9.4 8.6 - 10.5 mg/dL    Total Protein 7.0 6.0 - 8.5 g/dL    Albumin 4.0 3.5 - 5.2 g/dL    ALT (SGPT) 25 1 - 41 U/L    AST (SGOT) 28 1 - 40 U/L    Alkaline Phosphatase 94 39 - 117 U/L    Total Bilirubin 1.8 (H) 0.0 - 1.2 mg/dL    Globulin 3.0 gm/dL    A/G Ratio 1.3 g/dL    BUN/Creatinine Ratio 18.8 7.0 - 25.0    Anion Gap 10.2 5.0 - 15.0 mmol/L    eGFR 109.9 >60.0 mL/min/1.73   Lipase    Collection Time: 02/12/23 11:57 AM    Specimen: Blood   Result Value Ref Range    Lipase 68 (H) 13 - 60 U/L   CBC Auto Differential    Collection Time: 02/12/23 11:57 AM    Specimen: Blood   Result Value Ref Range    WBC 5.91 3.40 - 10.80 10*3/mm3    RBC 4.82 4.14 - 5.80 10*6/mm3    Hemoglobin 14.6 13.0 - 17.7 g/dL    Hematocrit 43.5 37.5 - 51.0 %    MCV 90.2 79.0 - 97.0 fL    MCH 30.3 26.6  - 33.0 pg    MCHC 33.6 31.5 - 35.7 g/dL    RDW 14.4 12.3 - 15.4 %    RDW-SD 47.6 37.0 - 54.0 fl    MPV 11.8 6.0 - 12.0 fL    Platelets 77 (L) 140 - 450 10*3/mm3    Neutrophil % 67.6 42.7 - 76.0 %    Lymphocyte % 10.3 (L) 19.6 - 45.3 %    Monocyte % 15.2 (H) 5.0 - 12.0 %    Eosinophil % 6.1 0.3 - 6.2 %    Basophil % 0.5 0.0 - 1.5 %    Neutrophils, Absolute 3.99 1.70 - 7.00 10*3/mm3    Lymphocytes, Absolute 0.61 (L) 0.70 - 3.10 10*3/mm3    Monocytes, Absolute 0.90 0.10 - 0.90 10*3/mm3    Eosinophils, Absolute 0.36 0.00 - 0.40 10*3/mm3    Basophils, Absolute 0.03 0.00 - 0.20 10*3/mm3   Procalcitonin    Collection Time: 02/12/23 11:57 AM    Specimen: Blood   Result Value Ref Range    Procalcitonin 0.33 (H) 0.00 - 0.25 ng/mL   Magnesium    Collection Time: 02/12/23 11:57 AM    Specimen: Blood   Result Value Ref Range    Magnesium 1.7 1.6 - 2.6 mg/dL   Urinalysis With Microscopic If Indicated (No Culture) - Urine, Clean Catch    Collection Time: 02/12/23 12:14 PM    Specimen: Urine, Clean Catch   Result Value Ref Range    Color, UA Dark Yellow (A) Yellow, Straw    Appearance, UA Cloudy (A) Clear    pH, UA 5.5 5.0 - 8.0    Specific Gravity, UA >=1.030 1.005 - 1.030    Glucose, UA Negative Negative    Ketones, UA Trace (A) Negative    Bilirubin, UA Small (1+) (A) Negative    Blood, UA Negative Negative    Protein, UA Trace (A) Negative    Leuk Esterase, UA Trace (A) Negative    Nitrite, UA Negative Negative    Urobilinogen, UA 1.0 E.U./dL 0.2 - 1.0 E.U./dL   Urinalysis, Microscopic Only - Urine, Clean Catch    Collection Time: 02/12/23 12:14 PM    Specimen: Urine, Clean Catch   Result Value Ref Range    RBC, UA None Seen None Seen, 0-2 /HPF    WBC, UA 6-12 (A) None Seen, 0-2 /HPF    Bacteria, UA 1+ (A) None Seen /HPF    Squamous Epithelial Cells, UA 3-6 (A) None Seen, 0-2 /HPF    Hyaline Casts, UA 21-30 None Seen /LPF    Methodology Manual Light Microscopy    Lactic Acid, Plasma    Collection Time: 02/12/23  4:12 PM     Specimen: Arm, Left; Blood   Result Value Ref Range    Lactate 1.3 0.5 - 2.0 mmol/L   Protime-INR    Collection Time: 02/12/23  4:20 PM    Specimen: Hand, Right; Blood   Result Value Ref Range    Protime 17.7 (H) 11.7 - 14.2 Seconds    INR 1.44 (H) 0.90 - 1.10       Radiology  CT Abdomen Pelvis Without Contrast    Result Date: 2/12/2023  CT ABDOMEN PELVIS WO CONTRAST-  INDICATIONS: Pain  TECHNIQUE: Radiation dose reduction techniques were utilized, including automated exposure control and exposure modulation based on body size. Unenhanced ABDOMEN AND PELVIS CT  COMPARISON: Correlated with MRI from 05/07/2021  FINDINGS:  Gallstones are seen in the gallbladder, and there appears to be gallbladder wall thickening and/or pericholecystic fluid that may represent acute cholecystitis; edema could also contribute to this appearance. If further imaging evaluation is indicated, ultrasound or hepatobiliary scintigraphy could be considered.  A nodular contour of the liver is noted, compatible with cirrhosis. Attenuation of the liver is heterogeneous. The spleen is enlarged, 19.0 cm CC.  Otherwise unremarkable unenhanced appearance of the liver, spleen, adrenal glands, pancreas, kidneys, bladder.  No bowel obstruction or abnormal bowel thickening is identified. Assessment for inflammatory changes of bowel is limited by diffuse mesenteric edema.  No free intraperitoneal gas or free fluid. Ventral hernias of fat are noted.  Scattered small mesenteric and para-aortic lymph nodes are seen that are not significant by size criteria.  Abdominal aorta is not aneurysmal. Aortic and other arterial calcifications are present.  The lung bases are clear.  Degenerative changes are seen in the spine. No acute fracture is identified.          1. Cholelithiasis with findings that may be evidence of acute cholecystitis, correlate clinically. 2. No acute inflammatory process of bowel is identified, follow up as indications persist. 3. No  urolithiasis or hydronephrosis. 4. Cirrhotic liver, splenomegaly. Ventral hernias of fat.  This report was finalized on 2/12/2023 12:56 PM by Dr. Oscar Martinez M.D.        Medical Decision Making:  ED Course as of 02/12/23 1907   Sun Feb 12, 2023   1243 Lipase(!): 68 [TD]   1243 Creatinine: 0.85 [TD]   1243 WBC: 5.91 [TD]   1243 Hemoglobin: 14.6 [TD]   1244 CT scan of the abdomen and pelvis interpreted by myself.  My findings include cirrhotic appearing liver.  Pericholecystic fluid with gallstones. [TD]   1403 I discussed patient with Dr. Silvestre, general surgery.  We reviewed the patient's history presentation labs, she has reviewed his imaging.  Based on his presentation, gallbladder findings most significant with edema related to cirrhosis and not acute cholecystitis, very low suspicion for that at this time.  If patient needs to be admitted, could get HIDA scan and she will consult. [KA]   1417 I discussed all lab and imaging results with the patient and family.  No alternative explanation for day [KA]   1417 There is no specific alternative explanation for his symptoms at this time, gallbladder findings may be related to chronic cirrhosis or an acute problem.  They agreeable with the plan for admission for further evaluation. [KA]   1417 I discussed the patient with [facility] for the ED observation unit who agrees to admit the patient on behalf of Dr. Hdez for further evaluation and treatment. [KA]      ED Course User Index  [KA] Elma Joseph PA  [TD] Max Mcneill II, MD         PPE: Both the patient and I wore a surgical mask throughout the entire patient encounter.     Diagnosis  Final diagnoses:   Generalized abdominal pain   Hepatic cirrhosis, unspecified hepatic cirrhosis type, unspecified whether ascites present (HCC)        Max Mcneill II, MD  02/12/23 1909

## 2023-02-13 NOTE — PLAN OF CARE
Goal Outcome Evaluation:  Plan of Care Reviewed With: patient        Progress: no change  Outcome Evaluation: A/O, medicated for pain, potassium replaced, NPO, HIDA scan today, need stool sample, IVF infusing, VSS, will continue to monitor.

## 2023-02-13 NOTE — PROGRESS NOTES
SUZETTE RYDER ATTESTATION NOTE    The GENO and I have discussed this patient's history, physical exam, and treatment plan.  I have reviewed the documentation and personally had a face to face interaction with the patient. I affirm the documentation and agree with the treatment and plan.  The attached note describes my personal findings.      I provided a substantive portion of the care of this patient. I personally performed the physical exam, in its entirety.    El Jiang is a 42 y.o. male who presented to the emergency department today after having some lower abdominal pain.  He reports the pain has been waxing waning over the last 4 days.  He has a history of alcohol induced cirrhosis.  Laboratory evaluation showed mild hypokalemia.  He had a normal CBC except for some low platelets of 77.  He has some cholelithiasis on CT scan and cholecystitis could not be ruled out.  He was admitted to the observation unit for general surgery consult and symptom control.  He does report he has persistent pain and the pain medicine only lasts for about an hour.  He denies any significant nausea.  He does request some trazodone for sleep.      On exam:  GENERAL: Awake, alert, no acute distress  SKIN: Warm, dry  HENT: Normocephalic, atraumatic  EYES: no scleral icterus  CV: regular rhythm, regular rate  RESPIRATORY: normal effort, lungs clear  ABDOMEN: soft, mild diffuse lower abdominal tenderness, nondistended  MUSCULOSKELETAL: no deformity  NEURO: alert, moves all extremities, follows commands        Labs  Recent Results (from the past 24 hour(s))   Comprehensive Metabolic Panel    Collection Time: 02/12/23 11:57 AM    Specimen: Blood   Result Value Ref Range    Glucose 106 (H) 65 - 99 mg/dL    BUN 16 6 - 20 mg/dL    Creatinine 0.85 0.76 - 1.27 mg/dL    Sodium 140 136 - 145 mmol/L    Potassium 3.3 (L) 3.5 - 5.2 mmol/L    Chloride 102 98 - 107 mmol/L    CO2 27.8 22.0 - 29.0 mmol/L    Calcium 9.4 8.6 - 10.5 mg/dL    Total Protein  7.0 6.0 - 8.5 g/dL    Albumin 4.0 3.5 - 5.2 g/dL    ALT (SGPT) 25 1 - 41 U/L    AST (SGOT) 28 1 - 40 U/L    Alkaline Phosphatase 94 39 - 117 U/L    Total Bilirubin 1.8 (H) 0.0 - 1.2 mg/dL    Globulin 3.0 gm/dL    A/G Ratio 1.3 g/dL    BUN/Creatinine Ratio 18.8 7.0 - 25.0    Anion Gap 10.2 5.0 - 15.0 mmol/L    eGFR 109.9 >60.0 mL/min/1.73   Lipase    Collection Time: 02/12/23 11:57 AM    Specimen: Blood   Result Value Ref Range    Lipase 68 (H) 13 - 60 U/L   CBC Auto Differential    Collection Time: 02/12/23 11:57 AM    Specimen: Blood   Result Value Ref Range    WBC 5.91 3.40 - 10.80 10*3/mm3    RBC 4.82 4.14 - 5.80 10*6/mm3    Hemoglobin 14.6 13.0 - 17.7 g/dL    Hematocrit 43.5 37.5 - 51.0 %    MCV 90.2 79.0 - 97.0 fL    MCH 30.3 26.6 - 33.0 pg    MCHC 33.6 31.5 - 35.7 g/dL    RDW 14.4 12.3 - 15.4 %    RDW-SD 47.6 37.0 - 54.0 fl    MPV 11.8 6.0 - 12.0 fL    Platelets 77 (L) 140 - 450 10*3/mm3    Neutrophil % 67.6 42.7 - 76.0 %    Lymphocyte % 10.3 (L) 19.6 - 45.3 %    Monocyte % 15.2 (H) 5.0 - 12.0 %    Eosinophil % 6.1 0.3 - 6.2 %    Basophil % 0.5 0.0 - 1.5 %    Neutrophils, Absolute 3.99 1.70 - 7.00 10*3/mm3    Lymphocytes, Absolute 0.61 (L) 0.70 - 3.10 10*3/mm3    Monocytes, Absolute 0.90 0.10 - 0.90 10*3/mm3    Eosinophils, Absolute 0.36 0.00 - 0.40 10*3/mm3    Basophils, Absolute 0.03 0.00 - 0.20 10*3/mm3   Procalcitonin    Collection Time: 02/12/23 11:57 AM    Specimen: Blood   Result Value Ref Range    Procalcitonin 0.33 (H) 0.00 - 0.25 ng/mL   Magnesium    Collection Time: 02/12/23 11:57 AM    Specimen: Blood   Result Value Ref Range    Magnesium 1.7 1.6 - 2.6 mg/dL   Urinalysis With Microscopic If Indicated (No Culture) - Urine, Clean Catch    Collection Time: 02/12/23 12:14 PM    Specimen: Urine, Clean Catch   Result Value Ref Range    Color, UA Dark Yellow (A) Yellow, Straw    Appearance, UA Cloudy (A) Clear    pH, UA 5.5 5.0 - 8.0    Specific Gravity, UA >=1.030 1.005 - 1.030    Glucose, UA Negative  Negative    Ketones, UA Trace (A) Negative    Bilirubin, UA Small (1+) (A) Negative    Blood, UA Negative Negative    Protein, UA Trace (A) Negative    Leuk Esterase, UA Trace (A) Negative    Nitrite, UA Negative Negative    Urobilinogen, UA 1.0 E.U./dL 0.2 - 1.0 E.U./dL   Urinalysis, Microscopic Only - Urine, Clean Catch    Collection Time: 02/12/23 12:14 PM    Specimen: Urine, Clean Catch   Result Value Ref Range    RBC, UA None Seen None Seen, 0-2 /HPF    WBC, UA 6-12 (A) None Seen, 0-2 /HPF    Bacteria, UA 1+ (A) None Seen /HPF    Squamous Epithelial Cells, UA 3-6 (A) None Seen, 0-2 /HPF    Hyaline Casts, UA 21-30 None Seen /LPF    Methodology Manual Light Microscopy    Lactic Acid, Plasma    Collection Time: 02/12/23  4:12 PM    Specimen: Arm, Left; Blood   Result Value Ref Range    Lactate 1.3 0.5 - 2.0 mmol/L   Protime-INR    Collection Time: 02/12/23  4:20 PM    Specimen: Hand, Right; Blood   Result Value Ref Range    Protime 17.7 (H) 11.7 - 14.2 Seconds    INR 1.44 (H) 0.90 - 1.10       Radiology  CT Abdomen Pelvis Without Contrast    Result Date: 2/12/2023  CT ABDOMEN PELVIS WO CONTRAST-  INDICATIONS: Pain  TECHNIQUE: Radiation dose reduction techniques were utilized, including automated exposure control and exposure modulation based on body size. Unenhanced ABDOMEN AND PELVIS CT  COMPARISON: Correlated with MRI from 05/07/2021  FINDINGS:  Gallstones are seen in the gallbladder, and there appears to be gallbladder wall thickening and/or pericholecystic fluid that may represent acute cholecystitis; edema could also contribute to this appearance. If further imaging evaluation is indicated, ultrasound or hepatobiliary scintigraphy could be considered.  A nodular contour of the liver is noted, compatible with cirrhosis. Attenuation of the liver is heterogeneous. The spleen is enlarged, 19.0 cm CC.  Otherwise unremarkable unenhanced appearance of the liver, spleen, adrenal glands, pancreas, kidneys, bladder.   No bowel obstruction or abnormal bowel thickening is identified. Assessment for inflammatory changes of bowel is limited by diffuse mesenteric edema.  No free intraperitoneal gas or free fluid. Ventral hernias of fat are noted.  Scattered small mesenteric and para-aortic lymph nodes are seen that are not significant by size criteria.  Abdominal aorta is not aneurysmal. Aortic and other arterial calcifications are present.  The lung bases are clear.  Degenerative changes are seen in the spine. No acute fracture is identified.          1. Cholelithiasis with findings that may be evidence of acute cholecystitis, correlate clinically. 2. No acute inflammatory process of bowel is identified, follow up as indications persist. 3. No urolithiasis or hydronephrosis. 4. Cirrhotic liver, splenomegaly. Ventral hernias of fat.  This report was finalized on 2/12/2023 12:56 PM by Dr. Oscar Martinez M.D.                PPE: The patient and I wore a mask throughout the entire patient encounter.        Note Disclaimer: At [facility], we believe that sharing information builds trust and better relationships. You are receiving this note because you recently visited [facility]. It is possible you will see health information before a provider has talked with you about it. This kind of information can be easy to misunderstand. To help you fully understand what it means for your health, we urge you to discuss this note with your provider.

## 2023-02-14 ENCOUNTER — READMISSION MANAGEMENT (OUTPATIENT)
Dept: CALL CENTER | Facility: HOSPITAL | Age: 43
End: 2023-02-14
Payer: MEDICARE

## 2023-02-14 NOTE — OUTREACH NOTE
Prep Survey    Flowsheet Row Responses   [facility] [employer] facility patient discharged from? [place]   Is LACE score < 7 ? No   Eligibility Readm Mgmt   Discharge diagnosis  Cholelithiasis    Does the patient have one of the following disease processes/diagnoses(primary or secondary)? Other   Does the patient have Home health ordered? No   Is there a DME ordered? No   Prep survey completed? Yes          Omayra RUBY - Registered Nurse

## 2023-02-22 ENCOUNTER — READMISSION MANAGEMENT (OUTPATIENT)
Dept: CALL CENTER | Facility: HOSPITAL | Age: 43
End: 2023-02-22
Payer: MEDICARE

## 2023-02-22 NOTE — OUTREACH NOTE
Medical Week 2 Survey    Flowsheet Row Responses   [facility] patient discharged from? [place]   Does the patient have one of the following disease processes/diagnoses(primary or secondary)? Other   Week 2 attempt successful? Yes   Call start time 1535   Discharge diagnosis  Cholelithiasis    Call end time 1540   Is patient permission given to speak with other caregiver? Yes   List who call center can speak with Sanaz spouse    Person spoke with today (if not patient) and relationship Sanaz spouse    Meds reviewed with patient/caregiver? Yes   Is the patient having any side effects they believe may be caused by any medication additions or changes? No   Does the patient have all medications ordered at discharge? Yes   Is the patient taking all medications as directed (includes completed medication regime)? Yes   Comments regarding appointments 2/23/23 gastroenterology apt ,  3/8/23 gastroenterology    Does the patient have a primary care provider?  Yes   Has the patient kept scheduled appointments due by today? N/A   Has home health visited the patient within 72 hours of discharge? N/A   Psychosocial issues? No   Did the patient receive a copy of their discharge instructions? Yes   Nursing interventions Reviewed instructions with patient   What is the patient's perception of their health status since discharge? Improving   Is the patient/caregiver able to teach back signs and symptoms related to disease process for when to call PCP? Yes   Is the patient/caregiver able to teach back signs and symptoms related to disease process for when to call 911? Yes   Is the patient/caregiver able to teach back the hierarchy of who to call/visit for symptoms/problems? PCP, Specialist, Home health nurse, Urgent Care, ED, 911 Yes   If the patient is a current smoker, are they able to teach back resources for cessation? [id number]   Week 2 Call Completed? Yes   Graduated Yes   Did the patient feel the follow up calls  were helpful during their recovery period? Yes   Graduated/Revoked comments Pt has a FU apt with his gastroenterologist tomorrow, 2/23/23          Trudy PENN - Registered Nurse

## 2023-02-23 ENCOUNTER — OFFICE VISIT (OUTPATIENT)
Dept: GASTROENTEROLOGY | Facility: CLINIC | Age: 43
End: 2023-02-23
Payer: MEDICARE

## 2023-02-23 VITALS
OXYGEN SATURATION: 98 % | BODY MASS INDEX: 22.42 KG/M2 | TEMPERATURE: 96.5 F | HEART RATE: 62 BPM | HEIGHT: 69 IN | DIASTOLIC BLOOD PRESSURE: 55 MMHG | SYSTOLIC BLOOD PRESSURE: 93 MMHG | WEIGHT: 151.4 LBS

## 2023-02-23 DIAGNOSIS — R10.30 LOWER ABDOMINAL PAIN: Primary | ICD-10-CM

## 2023-02-23 LAB
ALBUMIN SERPL-MCNC: 3.6 G/DL (ref 3.5–5.2)
ALBUMIN/GLOB SERPL: 1.3 G/DL
ALP SERPL-CCNC: 84 U/L (ref 39–117)
ALT SERPL-CCNC: 43 U/L (ref 1–41)
AST SERPL-CCNC: 29 U/L (ref 1–40)
BASOPHILS # BLD AUTO: 0.03 10*3/MM3 (ref 0–0.2)
BASOPHILS NFR BLD AUTO: 0.8 % (ref 0–1.5)
BILIRUB SERPL-MCNC: 1.1 MG/DL (ref 0–1.2)
BUN SERPL-MCNC: 11 MG/DL (ref 6–20)
BUN/CREAT SERPL: 16.9 (ref 7–25)
CALCIUM SERPL-MCNC: 9.1 MG/DL (ref 8.6–10.5)
CHLORIDE SERPL-SCNC: 104 MMOL/L (ref 98–107)
CO2 SERPL-SCNC: 29.9 MMOL/L (ref 22–29)
CREAT SERPL-MCNC: 0.65 MG/DL (ref 0.76–1.27)
EGFRCR SERPLBLD CKD-EPI 2021: 118.4 ML/MIN/1.73
EOSINOPHIL # BLD AUTO: 0.17 10*3/MM3 (ref 0–0.4)
EOSINOPHIL NFR BLD AUTO: 4.3 % (ref 0.3–6.2)
ERYTHROCYTE [DISTWIDTH] IN BLOOD BY AUTOMATED COUNT: 14.1 % (ref 12.3–15.4)
GLOBULIN SER CALC-MCNC: 2.8 GM/DL
GLUCOSE SERPL-MCNC: 87 MG/DL (ref 65–99)
HCT VFR BLD AUTO: 36.7 % (ref 37.5–51)
HGB BLD-MCNC: 12.7 G/DL (ref 13–17.7)
IMM GRANULOCYTES # BLD AUTO: 0.02 10*3/MM3 (ref 0–0.05)
IMM GRANULOCYTES NFR BLD AUTO: 0.5 % (ref 0–0.5)
LYMPHOCYTES # BLD AUTO: 0.43 10*3/MM3 (ref 0.7–3.1)
LYMPHOCYTES NFR BLD AUTO: 11 % (ref 19.6–45.3)
MCH RBC QN AUTO: 30.8 PG (ref 26.6–33)
MCHC RBC AUTO-ENTMCNC: 34.6 G/DL (ref 31.5–35.7)
MCV RBC AUTO: 88.9 FL (ref 79–97)
MONOCYTES # BLD AUTO: 0.43 10*3/MM3 (ref 0.1–0.9)
MONOCYTES NFR BLD AUTO: 11 % (ref 5–12)
NEUTROPHILS # BLD AUTO: 2.83 10*3/MM3 (ref 1.7–7)
NEUTROPHILS NFR BLD AUTO: 72.4 % (ref 42.7–76)
PLATELET # BLD AUTO: 62 10*3/MM3 (ref 140–450)
POTASSIUM SERPL-SCNC: 4.2 MMOL/L (ref 3.5–5.2)
PROT SERPL-MCNC: 6.4 G/DL (ref 6–8.5)
RBC # BLD AUTO: 4.13 10*6/MM3 (ref 4.14–5.8)
SODIUM SERPL-SCNC: 140 MMOL/L (ref 136–145)
WBC # BLD AUTO: 3.91 10*3/MM3 (ref 3.4–10.8)

## 2023-02-23 PROCEDURE — 99213 OFFICE O/P EST LOW 20 MIN: CPT | Performed by: INTERNAL MEDICINE

## 2023-02-23 NOTE — PROGRESS NOTES
"Chief Complaint   Patient presents with   • Abdominal Pain     Lower abdomen        El Jiang is a  43 y.o. male here for a follow up visit for lower abdominal pain.    HPI     Patient 43-year-old male with history of alcohol-related cirrhosis with related treated varices who presented to the emergency room with acute abdominal discomfort with nausea vomiting diarrhea.  Patient seen 2 weeks ago reports the nausea and the vomiting have improved as well as the diarrhea but still having lower abdominal crampy pain.  Patient denies any bright red blood per rectum or melena.  Labs in the ER were at baseline.  No stools were sent though his stool symptoms have spontaneously resolved here for further recommendations.    Past Medical History:   Diagnosis Date   • Acquired stuttering     WIFE STATES HE CAN BE HARD TO UNDERSTAND.. \"FRENCH ACCENT AND STUTTERS\"   • Bleeding esophageal varices (HCC)    • Cirrhosis (HCC)     end-stage   • H/O PAF (paroxysmal atrial fibrillation) (CMS/HCC)    • Hepatosplenomegaly     secondary to cirrhosis   • History of alcohol abuse    • History of anemia    • History of ascites    • History of sepsis    • History of transfusion    • Hyperlipidemia          Current Outpatient Medications:   •  ferrous sulfate 324 (65 Fe) MG tablet delayed-release EC tablet, Take 324 mg by mouth Daily With Breakfast., Disp: , Rfl:   •  furosemide (LASIX) 40 MG tablet, Take 1 tablet by mouth Daily., Disp: 90 tablet, Rfl: 3  •  glucosamine-chondroitin 500-400 MG capsule capsule, Take  by mouth Daily., Disp: , Rfl:   •  multivitamin with minerals tablet tablet, Take 1 tablet by mouth Daily., Disp: , Rfl:   •  nadolol (CORGARD) 40 MG tablet, Take 1 tablet by mouth Daily., Disp: 90 tablet, Rfl: 3  •  ondansetron (ZOFRAN) 4 MG tablet, Take 1 tablet by mouth Every 6 (Six) Hours As Needed for Nausea or Vomiting., Disp: 30 tablet, Rfl: 1  •  pantoprazole (PROTONIX) 40 MG EC tablet, Take 1 tablet by mouth 2 (Two) " Times a Day. (Patient taking differently: Take 40 mg by mouth Daily.), Disp: 180 tablet, Rfl: 3  •  spironolactone (Aldactone) 25 MG tablet, Take 1 tablet by mouth Daily., Disp: 90 tablet, Rfl: 3  •  traZODone (DESYREL) 50 MG tablet, Take 1 tablet by mouth At Night As Needed for Sleep., Disp: 30 tablet, Rfl: 0  •  hyoscyamine (LEVSIN) 0.125 MG SL tablet, Take 1 tablet by mouth 4 (Four) Times a Day With Meals & at Bedtime., Disp: 120 tablet, Rfl: 5    No Known Allergies    Social History     Socioeconomic History   • Marital status:    Tobacco Use   • Smoking status: Every Day     Packs/day: 0.50     Types: Cigarettes   • Smokeless tobacco: Never   • Tobacco comments:     quit 6 months ago   Vaping Use   • Vaping Use: Never used   Substance and Sexual Activity   • Alcohol use: Not Currently     Comment: H/O alcoholism (quit 08/2020)   • Drug use: Yes     Frequency: 1.0 times per week     Types: Marijuana   • Sexual activity: Defer       Family History   Problem Relation Age of Onset   • Ovarian cancer Mother    • Throat cancer Father    • Malig Hyperthermia Neg Hx        Review of Systems   Constitutional: Negative.    Respiratory: Negative.    Cardiovascular: Negative.    Gastrointestinal: Positive for abdominal pain. Negative for abdominal distention, anal bleeding, blood in stool, constipation, diarrhea, nausea, rectal pain and vomiting.   Musculoskeletal: Negative.    Skin: Negative.    Hematological: Negative.        Vitals:    02/23/23 0915   BP: 93/55   Pulse: 62   Temp: 96.5 °F (35.8 °C)   SpO2: 98%       Physical Exam  Vitals reviewed.   Constitutional:       Appearance: Normal appearance. He is well-developed.   HENT:      Head: Normocephalic and atraumatic.   Eyes:      General: No scleral icterus.     Pupils: Pupils are equal, round, and reactive to light.   Pulmonary:      Effort: Pulmonary effort is normal. No respiratory distress.      Breath sounds: Normal breath sounds.   Abdominal:       General: Bowel sounds are normal. There is no distension.      Palpations: Abdomen is soft. There is no mass.      Tenderness: There is no abdominal tenderness.      Hernia: No hernia is present.   Skin:     General: Skin is warm and dry.      Coloration: Skin is not jaundiced.      Findings: No rash.   Neurological:      General: No focal deficit present.      Mental Status: He is alert and oriented to person, place, and time.      Cranial Nerves: No cranial nerve deficit.   Psychiatric:         Behavior: Behavior normal.         Thought Content: Thought content normal.         Judgment: Judgment normal.         Admission on 02/12/2023, Discharged on 02/13/2023   Component Date Value Ref Range Status   • Glucose 02/12/2023 106 (H)  65 - 99 mg/dL Final   • BUN 02/12/2023 16  6 - 20 mg/dL Final   • Creatinine 02/12/2023 0.85  0.76 - 1.27 mg/dL Final   • Sodium 02/12/2023 140  136 - 145 mmol/L Final   • Potassium 02/12/2023 3.3 (L)  3.5 - 5.2 mmol/L Final   • Chloride 02/12/2023 102  98 - 107 mmol/L Final   • CO2 02/12/2023 27.8  22.0 - 29.0 mmol/L Final   • Calcium 02/12/2023 9.4  8.6 - 10.5 mg/dL Final   • Total Protein 02/12/2023 7.0  6.0 - 8.5 g/dL Final   • Albumin 02/12/2023 4.0  3.5 - 5.2 g/dL Final   • ALT (SGPT) 02/12/2023 25  1 - 41 U/L Final   • AST (SGOT) 02/12/2023 28  1 - 40 U/L Final   • Alkaline Phosphatase 02/12/2023 94  39 - 117 U/L Final   • Total Bilirubin 02/12/2023 1.8 (H)  0.0 - 1.2 mg/dL Final   • Globulin 02/12/2023 3.0  gm/dL Final   • A/G Ratio 02/12/2023 1.3  g/dL Final   • BUN/Creatinine Ratio 02/12/2023 18.8  7.0 - 25.0 Final   • Anion Gap 02/12/2023 10.2  5.0 - 15.0 mmol/L Final   • eGFR 02/12/2023 109.9  >60.0 mL/min/1.73 Final   • Lipase 02/12/2023 68 (H)  13 - 60 U/L Final   • Color, UA 02/12/2023 Dark Yellow (A)  Yellow, Straw Final   • Appearance, UA 02/12/2023 Cloudy (A)  Clear Final   • pH, UA 02/12/2023 5.5  5.0 - 8.0 Final   • Specific Gravity, UA 02/12/2023 >=1.030  1.005 -  1.030 Final   • Glucose, UA 02/12/2023 Negative  Negative Final   • Ketones, UA 02/12/2023 Trace (A)  Negative Final   • Bilirubin, UA 02/12/2023 Small (1+) (A)  Negative Final   • Blood, UA 02/12/2023 Negative  Negative Final   • Protein, UA 02/12/2023 Trace (A)  Negative Final   • Leuk Esterase, UA 02/12/2023 Trace (A)  Negative Final   • Nitrite, UA 02/12/2023 Negative  Negative Final   • Urobilinogen, UA 02/12/2023 1.0 E.U./dL  0.2 - 1.0 E.U./dL Final   • WBC 02/12/2023 5.91  3.40 - 10.80 10*3/mm3 Final   • RBC 02/12/2023 4.82  4.14 - 5.80 10*6/mm3 Final   • Hemoglobin 02/12/2023 14.6  13.0 - 17.7 g/dL Final   • Hematocrit 02/12/2023 43.5  37.5 - 51.0 % Final   • MCV 02/12/2023 90.2  79.0 - 97.0 fL Final   • MCH 02/12/2023 30.3  26.6 - 33.0 pg Final   • MCHC 02/12/2023 33.6  31.5 - 35.7 g/dL Final   • RDW 02/12/2023 14.4  12.3 - 15.4 % Final   • RDW-SD 02/12/2023 47.6  37.0 - 54.0 fl Final   • MPV 02/12/2023 11.8  6.0 - 12.0 fL Final   • Platelets 02/12/2023 77 (L)  140 - 450 10*3/mm3 Final   • Neutrophil % 02/12/2023 67.6  42.7 - 76.0 % Final   • Lymphocyte % 02/12/2023 10.3 (L)  19.6 - 45.3 % Final   • Monocyte % 02/12/2023 15.2 (H)  5.0 - 12.0 % Final   • Eosinophil % 02/12/2023 6.1  0.3 - 6.2 % Final   • Basophil % 02/12/2023 0.5  0.0 - 1.5 % Final   • Neutrophils, Absolute 02/12/2023 3.99  1.70 - 7.00 10*3/mm3 Final   • Lymphocytes, Absolute 02/12/2023 0.61 (L)  0.70 - 3.10 10*3/mm3 Final   • Monocytes, Absolute 02/12/2023 0.90  0.10 - 0.90 10*3/mm3 Final   • Eosinophils, Absolute 02/12/2023 0.36  0.00 - 0.40 10*3/mm3 Final   • Basophils, Absolute 02/12/2023 0.03  0.00 - 0.20 10*3/mm3 Final   • RBC, UA 02/12/2023 None Seen  None Seen, 0-2 /HPF Final   • WBC, UA 02/12/2023 6-12 (A)  None Seen, 0-2 /HPF Final   • Bacteria, UA 02/12/2023 1+ (A)  None Seen /HPF Final   • Squamous Epithelial Cells, UA 02/12/2023 3-6 (A)  None Seen, 0-2 /HPF Final   • Hyaline Casts, UA 02/12/2023 21-30  None Seen /LPF Final   •  Methodology 02/12/2023 Manual Light Microscopy   Final   • Protime 02/12/2023 17.7 (H)  11.7 - 14.2 Seconds Final   • INR 02/12/2023 1.44 (H)  0.90 - 1.10 Final   • Procalcitonin 02/12/2023 0.33 (H)  0.00 - 0.25 ng/mL Final   • Magnesium 02/12/2023 1.7  1.6 - 2.6 mg/dL Final   • Lactate 02/12/2023 1.3  0.5 - 2.0 mmol/L Final   • WBC 02/13/2023 3.72  3.40 - 10.80 10*3/mm3 Final   • RBC 02/13/2023 4.03 (L)  4.14 - 5.80 10*6/mm3 Final   • Hemoglobin 02/13/2023 12.3 (L)  13.0 - 17.7 g/dL Final   • Hematocrit 02/13/2023 36.0 (L)  37.5 - 51.0 % Final   • MCV 02/13/2023 89.3  79.0 - 97.0 fL Final   • MCH 02/13/2023 30.5  26.6 - 33.0 pg Final   • MCHC 02/13/2023 34.2  31.5 - 35.7 g/dL Final   • RDW 02/13/2023 14.3  12.3 - 15.4 % Final   • RDW-SD 02/13/2023 46.7  37.0 - 54.0 fl Final   • MPV 02/13/2023 11.5  6.0 - 12.0 fL Final   • Platelets 02/13/2023 57 (L)  140 - 450 10*3/mm3 Final   • Glucose 02/13/2023 111 (H)  65 - 99 mg/dL Final   • BUN 02/13/2023 14  6 - 20 mg/dL Final   • Creatinine 02/13/2023 0.65 (L)  0.76 - 1.27 mg/dL Final   • Sodium 02/13/2023 134 (L)  136 - 145 mmol/L Final   • Potassium 02/13/2023 4.2  3.5 - 5.2 mmol/L Final   • Chloride 02/13/2023 104  98 - 107 mmol/L Final   • CO2 02/13/2023 24.2  22.0 - 29.0 mmol/L Final   • Calcium 02/13/2023 8.3 (L)  8.6 - 10.5 mg/dL Final   • Total Protein 02/13/2023 5.8 (L)  6.0 - 8.5 g/dL Final   • Albumin 02/13/2023 3.3 (L)  3.5 - 5.2 g/dL Final   • ALT (SGPT) 02/13/2023 20  1 - 41 U/L Final   • AST (SGOT) 02/13/2023 25  1 - 40 U/L Final   • Alkaline Phosphatase 02/13/2023 77  39 - 117 U/L Final   • Total Bilirubin 02/13/2023 1.4 (H)  0.0 - 1.2 mg/dL Final   • Globulin 02/13/2023 2.5  gm/dL Final   • A/G Ratio 02/13/2023 1.3  g/dL Final   • BUN/Creatinine Ratio 02/13/2023 21.5  7.0 - 25.0 Final   • Anion Gap 02/13/2023 5.8  5.0 - 15.0 mmol/L Final   • eGFR 02/13/2023 119.2  >60.0 mL/min/1.73 Final   • Lipase 02/13/2023 48  13 - 60 U/L Final   • Neutrophil %  02/13/2023 66.3  42.7 - 76.0 % Final   • Lymphocyte % 02/13/2023 14.0 (L)  19.6 - 45.3 % Final   • Monocyte % 02/13/2023 11.6  5.0 - 12.0 % Final   • Eosinophil % 02/13/2023 7.0 (H)  0.3 - 6.2 % Final   • Basophil % 02/13/2023 1.2  0.0 - 1.5 % Final   • Neutrophils Absolute 02/13/2023 2.47  1.70 - 7.00 10*3/mm3 Final   • Lymphocytes Absolute 02/13/2023 0.52 (L)  0.70 - 3.10 10*3/mm3 Final   • Monocytes Absolute 02/13/2023 0.43  0.10 - 0.90 10*3/mm3 Final   • Eosinophils Absolute 02/13/2023 0.26  0.00 - 0.40 10*3/mm3 Final   • Basophils Absolute 02/13/2023 0.04  0.00 - 0.20 10*3/mm3 Final   • Dacrocytes 02/13/2023 Slight/1+  None Seen Final   • Ovalocytes 02/13/2023 Slight/1+  None Seen Final   • Poikilocytes 02/13/2023 Mod/2+  None Seen Final   • Smudge Cells 02/13/2023 Slight/1+  None Seen Final   • Platelet Morphology 02/13/2023 Normal  Normal Final       Diagnoses and all orders for this visit:    1. Lower abdominal pain (Primary)  -     Comprehensive Metabolic Panel  -     CBC & Differential    Other orders  -     hyoscyamine (LEVSIN) 0.125 MG SL tablet; Take 1 tablet by mouth 4 (Four) Times a Day With Meals & at Bedtime.  Dispense: 120 tablet; Refill: 5      Patient 43-year-old male with history of alcohol related cirrhosis currently abstinent developed acute abdominal issues seen in the emergency room on the 12th.  Patient had nausea vomiting and diarrhea having as well as lower abdominal pain.  Since that time patient reports his lower abdominal pain continues though the nausea vomiting and the diarrhea have resolved.  Symptoms seems to be cramping related.  Patient reports spasms last for few minutes up to 3 times a day unrelated to eating or drinking and unrelated to bowel movements.  Pain will spontaneously resolve after a while but the pain can be significant.  This point likely postinfectious spasms.  Recommend Levsin sublingual as needed and follow clinically.  We will recheck labs to confirm no other  abnormalities in the meantime.  Patient is scheduled for follow-up in 2 weeks.

## 2023-03-08 ENCOUNTER — OFFICE VISIT (OUTPATIENT)
Dept: GASTROENTEROLOGY | Facility: CLINIC | Age: 43
End: 2023-03-08
Payer: MEDICARE

## 2023-03-08 VITALS
HEART RATE: 60 BPM | WEIGHT: 150 LBS | TEMPERATURE: 96.9 F | BODY MASS INDEX: 22.22 KG/M2 | HEIGHT: 69 IN | SYSTOLIC BLOOD PRESSURE: 104 MMHG | DIASTOLIC BLOOD PRESSURE: 54 MMHG

## 2023-03-08 DIAGNOSIS — R10.9 ABDOMINAL CRAMPS: Primary | ICD-10-CM

## 2023-03-08 DIAGNOSIS — R14.0 BLOATING SYMPTOM: ICD-10-CM

## 2023-03-08 DIAGNOSIS — K21.9 GASTROESOPHAGEAL REFLUX DISEASE, UNSPECIFIED WHETHER ESOPHAGITIS PRESENT: ICD-10-CM

## 2023-03-08 DIAGNOSIS — I85.10 SECONDARY ESOPHAGEAL VARICES WITHOUT BLEEDING: ICD-10-CM

## 2023-03-08 DIAGNOSIS — K70.30 ALCOHOLIC CIRRHOSIS OF LIVER WITHOUT ASCITES: ICD-10-CM

## 2023-03-08 PROCEDURE — 99214 OFFICE O/P EST MOD 30 MIN: CPT | Performed by: NURSE PRACTITIONER

## 2023-03-09 ENCOUNTER — TELEPHONE (OUTPATIENT)
Dept: GASTROENTEROLOGY | Facility: CLINIC | Age: 43
End: 2023-03-09
Payer: MEDICARE

## 2023-03-09 NOTE — TELEPHONE ENCOUNTER
Caller: Sanaz Jiang    Relationship: Emergency Contact    Best call back number: [phone]     Which medication are you concerned about: XIFAXAN 550 MG    Who prescribed you this medication: ANABELA PRESSLEY    What are your concerns: WAS TOLD TO CONTACT THE OFFICE IF THE MEDICATION IS TO COSTLY. THE MEDICATION COST $785. WOULD LIKE TO KNOW IF ANOTHER MED CAN BE SENT OVER     How long have you had these concerns: PATIENT JUST PRESCRIBED MED 3/8/23

## 2023-03-09 NOTE — TELEPHONE ENCOUNTER
----- Message from LAN Berry sent at 3/8/2023  3:07 PM EST -----  Can we make sure that Placido gets back in for 3-month follow-up with Dr. Magaña

## 2023-03-16 NOTE — TELEPHONE ENCOUNTER
Per [facility]:  Request Reference Number: [id number]. XIFAXAN TAB 550MG is approved through 03/27/2023. Your patient may now fill this prescription and it will be covered.    Patient's wife notified.

## 2023-03-22 ENCOUNTER — TELEPHONE (OUTPATIENT)
Dept: GASTROENTEROLOGY | Facility: CLINIC | Age: 43
End: 2023-03-22
Payer: MEDICARE

## 2023-06-15 ENCOUNTER — OFFICE VISIT (OUTPATIENT)
Dept: GASTROENTEROLOGY | Facility: CLINIC | Age: 43
End: 2023-06-15
Payer: MEDICARE

## 2023-06-15 ENCOUNTER — TELEPHONE (OUTPATIENT)
Dept: GASTROENTEROLOGY | Facility: CLINIC | Age: 43
End: 2023-06-15
Payer: MEDICARE

## 2023-06-15 VITALS
TEMPERATURE: 97.7 F | HEIGHT: 69 IN | WEIGHT: 148 LBS | DIASTOLIC BLOOD PRESSURE: 70 MMHG | BODY MASS INDEX: 21.92 KG/M2 | OXYGEN SATURATION: 99 % | SYSTOLIC BLOOD PRESSURE: 109 MMHG | HEART RATE: 59 BPM

## 2023-06-15 DIAGNOSIS — I85.10 SECONDARY ESOPHAGEAL VARICES WITHOUT BLEEDING: Primary | ICD-10-CM

## 2023-06-15 DIAGNOSIS — K70.30 ALCOHOLIC CIRRHOSIS OF LIVER WITHOUT ASCITES: ICD-10-CM

## 2023-06-15 PROCEDURE — 1160F RVW MEDS BY RX/DR IN RCRD: CPT | Performed by: INTERNAL MEDICINE

## 2023-06-15 PROCEDURE — 99213 OFFICE O/P EST LOW 20 MIN: CPT | Performed by: INTERNAL MEDICINE

## 2023-06-15 PROCEDURE — 1159F MED LIST DOCD IN RCRD: CPT | Performed by: INTERNAL MEDICINE

## 2023-06-15 RX ORDER — DICYCLOMINE HYDROCHLORIDE 10 MG/1
10 CAPSULE ORAL 2 TIMES DAILY
Qty: 60 CAPSULE | Refills: 11 | Status: SHIPPED | OUTPATIENT
Start: 2023-06-15

## 2023-06-15 NOTE — TELEPHONE ENCOUNTER
OK FOR HUB TO READ  PT IS [facility] FOR EGD ON 7/7/23  PT HANDED PREP ANAYELI BLANTON/ NATALIO

## 2023-06-15 NOTE — PROGRESS NOTES
"Chief Complaint   Patient presents with   • Cirrhosis       El Jiang is a  43 y.o. male here for a follow up visit for alcohol-related cirrhosis.    HPI     Patient 43-year-old male with history of cirrhosis, paroxysmal A-fib and hyperlipidemia here for follow-up of his cirrhosis.  Patient reports alternating stools from normal to green to dark.  Patient also experiencing crampy abdominal discomfort intermittently.  Patient last scope June 2022 with no significant varices recently increased his nadolol by transplant team to better control heart rate.  Patient here for follow-up.    Past Medical History:   Diagnosis Date   • Acquired stuttering     WIFE STATES HE CAN BE HARD TO UNDERSTAND.. \"FRENCH ACCENT AND STUTTERS\"   • Bleeding esophageal varices    • Cirrhosis     end-stage   • H/O PAF (paroxysmal atrial fibrillation) (CMS/HCC)    • Hepatosplenomegaly     secondary to cirrhosis   • History of alcohol abuse    • History of anemia    • History of ascites    • History of sepsis    • History of transfusion    • Hyperlipidemia          Current Outpatient Medications:   •  ferrous sulfate 324 (65 Fe) MG tablet delayed-release EC tablet, Take 1 tablet by mouth Daily With Breakfast., Disp: , Rfl:   •  furosemide (LASIX) 40 MG tablet, Take 1 tablet by mouth Daily., Disp: 90 tablet, Rfl: 3  •  glucosamine-chondroitin 500-400 MG capsule capsule, Take  by mouth Daily., Disp: , Rfl:   •  multivitamin with minerals tablet tablet, Take 1 tablet by mouth Daily., Disp: , Rfl:   •  nadolol (CORGARD) 40 MG tablet, Take 1 tablet by mouth Daily., Disp: 90 tablet, Rfl: 3  •  ondansetron (ZOFRAN) 4 MG tablet, Take 1 tablet by mouth Every 6 (Six) Hours As Needed for Nausea or Vomiting., Disp: 30 tablet, Rfl: 1  •  pantoprazole (PROTONIX) 40 MG EC tablet, Take 1 tablet by mouth 2 (Two) Times a Day. (Patient taking differently: Take 1 tablet by mouth Daily.), Disp: 180 tablet, Rfl: 3  •  spironolactone (Aldactone) 25 MG tablet, " Take 1 tablet by mouth Daily., Disp: 90 tablet, Rfl: 3  •  traZODone (DESYREL) 50 MG tablet, Take 1 tablet by mouth At Night As Needed for Sleep., Disp: 30 tablet, Rfl: 0  •  dicyclomine (BENTYL) 10 MG capsule, Take 1 capsule by mouth 2 (Two) Times a Day., Disp: 60 capsule, Rfl: 11    No Known Allergies    Social History     Socioeconomic History   • Marital status:    Tobacco Use   • Smoking status: Every Day     Packs/day: 0.50     Types: Cigarettes   • Smokeless tobacco: Never   • Tobacco comments:     quit 6 months ago   Vaping Use   • Vaping Use: Never used   Substance and Sexual Activity   • Alcohol use: Not Currently     Comment: H/O alcoholism (quit 08/2020)   • Drug use: Yes     Frequency: 1.0 times per week     Types: Marijuana   • Sexual activity: Defer       Family History   Problem Relation Age of Onset   • Ovarian cancer Mother    • Throat cancer Father    • Malig Hyperthermia Neg Hx        Review of Systems   Constitutional: Negative.    Respiratory: Negative.    Cardiovascular: Negative.    Gastrointestinal: Positive for abdominal pain. Negative for abdominal distention, anal bleeding, blood in stool, constipation, diarrhea, nausea, rectal pain and vomiting.   Musculoskeletal: Negative.    Skin: Negative.    Hematological: Negative.        Vitals:    06/15/23 1323   BP: 109/70   Pulse: 59   Temp: 97.7 °F (36.5 °C)   SpO2: 99%       Physical Exam  Vitals reviewed.   Constitutional:       Appearance: Normal appearance. He is well-developed and normal weight.   HENT:      Head: Normocephalic and atraumatic.      Mouth/Throat:      Mouth: Mucous membranes are moist.   Eyes:      General: No scleral icterus.     Pupils: Pupils are equal, round, and reactive to light.   Cardiovascular:      Rate and Rhythm: Normal rate and regular rhythm.      Heart sounds: Normal heart sounds.   Pulmonary:      Effort: Pulmonary effort is normal. No respiratory distress.      Breath sounds: Normal breath sounds.    Abdominal:      General: Bowel sounds are normal. There is no distension.      Palpations: Abdomen is soft.      Tenderness: There is no abdominal tenderness.   Skin:     General: Skin is warm and dry.      Coloration: Skin is not jaundiced.      Findings: No rash.   Neurological:      General: No focal deficit present.      Mental Status: He is alert and oriented to person, place, and time.      Cranial Nerves: No cranial nerve deficit.   Psychiatric:         Mood and Affect: Mood normal.         Behavior: Behavior normal.         Thought Content: Thought content normal.         No visits with results within 2 Month(s) from this visit.   Latest known visit with results is:   Office Visit on 02/23/2023   Component Date Value Ref Range Status   • Glucose 02/23/2023 87  65 - 99 mg/dL Final   • BUN 02/23/2023 11  6 - 20 mg/dL Final   • Creatinine 02/23/2023 0.65 (L)  0.76 - 1.27 mg/dL Final   • EGFR Result 02/23/2023 118.4  >60.0 mL/min/1.73 Final   • BUN/Creatinine Ratio 02/23/2023 16.9  7.0 - 25.0 Final   • Sodium 02/23/2023 140  136 - 145 mmol/L Final   • Potassium 02/23/2023 4.2  3.5 - 5.2 mmol/L Final   • Chloride 02/23/2023 104  98 - 107 mmol/L Final   • Total CO2 02/23/2023 29.9 (H)  22.0 - 29.0 mmol/L Final   • Calcium 02/23/2023 9.1  8.6 - 10.5 mg/dL Final   • Total Protein 02/23/2023 6.4  6.0 - 8.5 g/dL Final   • Albumin 02/23/2023 3.6  3.5 - 5.2 g/dL Final   • Globulin 02/23/2023 2.8  gm/dL Final   • A/G Ratio 02/23/2023 1.3  g/dL Final   • Total Bilirubin 02/23/2023 1.1  0.0 - 1.2 mg/dL Final   • Alkaline Phosphatase 02/23/2023 84  39 - 117 U/L Final   • AST (SGOT) 02/23/2023 29  1 - 40 U/L Final   • ALT (SGPT) 02/23/2023 43 (H)  1 - 41 U/L Final   • WBC 02/23/2023 3.91  3.40 - 10.80 10*3/mm3 Final   • RBC 02/23/2023 4.13 (L)  4.14 - 5.80 10*6/mm3 Final   • Hemoglobin 02/23/2023 12.7 (L)  13.0 - 17.7 g/dL Final   • Hematocrit 02/23/2023 36.7 (L)  37.5 - 51.0 % Final   • MCV 02/23/2023 88.9  79.0 - 97.0 fL  Final   • MCH 02/23/2023 30.8  26.6 - 33.0 pg Final   • MCHC 02/23/2023 34.6  31.5 - 35.7 g/dL Final   • RDW 02/23/2023 14.1  12.3 - 15.4 % Final   • Platelets 02/23/2023 62 (L)  140 - 450 10*3/mm3 Final   • Neutrophil Rel % 02/23/2023 72.4  42.7 - 76.0 % Final   • Lymphocyte Rel % 02/23/2023 11.0 (L)  19.6 - 45.3 % Final   • Monocyte Rel % 02/23/2023 11.0  5.0 - 12.0 % Final   • Eosinophil Rel % 02/23/2023 4.3  0.3 - 6.2 % Final   • Basophil Rel % 02/23/2023 0.8  0.0 - 1.5 % Final   • Neutrophils Absolute 02/23/2023 2.83  1.70 - 7.00 10*3/mm3 Final   • Lymphocytes Absolute 02/23/2023 0.43 (L)  0.70 - 3.10 10*3/mm3 Final   • Monocytes Absolute 02/23/2023 0.43  0.10 - 0.90 10*3/mm3 Final   • Eosinophils Absolute 02/23/2023 0.17  0.00 - 0.40 10*3/mm3 Final   • Basophils Absolute 02/23/2023 0.03  0.00 - 0.20 10*3/mm3 Final   • Immature Granulocyte Rel % 02/23/2023 0.5  0.0 - 0.5 % Final   • Immature Grans Absolute 02/23/2023 0.02  0.00 - 0.05 10*3/mm3 Final       Diagnoses and all orders for this visit:    1. Secondary esophageal varices without bleeding (Primary)  -     Case Request; Standing  -     Implement Anesthesia orders day of procedure.; Standing  -     Obtain informed consent; Standing  -     Case Request    2. Alcoholic cirrhosis of liver without ascites  -     Case Request; Standing  -     Implement Anesthesia orders day of procedure.; Standing  -     Obtain informed consent; Standing  -     Case Request    Other orders  -     dicyclomine (BENTYL) 10 MG capsule; Take 1 capsule by mouth 2 (Two) Times a Day.  Dispense: 60 capsule; Refill: 11      Patient 43-year-old male with history of alcohol related cirrhosis now abstinent for 2 years with a history of varices status post band ligation.  Patient is due for follow-up EGD, will place request.  Patient with ongoing intermittent crampy pain and did not find improvement with Levsin we will give trial of twice daily Bentyl as a preventative and follow  clinically.  We will continue to monitor and repeat labs to check on progression.

## 2023-06-16 LAB
AFP-TM SERPL-MCNC: 2.3 NG/ML (ref 0–6.9)
ALBUMIN SERPL-MCNC: 4.1 G/DL (ref 3.5–5.2)
ALBUMIN/GLOB SERPL: 1.5 G/DL
ALP SERPL-CCNC: 67 U/L (ref 39–117)
ALT SERPL-CCNC: 31 U/L (ref 1–41)
AST SERPL-CCNC: 34 U/L (ref 1–40)
BASOPHILS # BLD AUTO: 0.05 10*3/MM3 (ref 0–0.2)
BASOPHILS NFR BLD AUTO: 1.1 % (ref 0–1.5)
BILIRUB SERPL-MCNC: 1.6 MG/DL (ref 0–1.2)
BUN SERPL-MCNC: 10 MG/DL (ref 6–20)
BUN/CREAT SERPL: 16.1 (ref 7–25)
CALCIUM SERPL-MCNC: 9.9 MG/DL (ref 8.6–10.5)
CHLORIDE SERPL-SCNC: 105 MMOL/L (ref 98–107)
CO2 SERPL-SCNC: 28.1 MMOL/L (ref 22–29)
CREAT SERPL-MCNC: 0.62 MG/DL (ref 0.76–1.27)
EGFRCR SERPLBLD CKD-EPI 2021: 120.1 ML/MIN/1.73
EOSINOPHIL # BLD AUTO: 0.4 10*3/MM3 (ref 0–0.4)
EOSINOPHIL NFR BLD AUTO: 8.5 % (ref 0.3–6.2)
ERYTHROCYTE [DISTWIDTH] IN BLOOD BY AUTOMATED COUNT: 13.7 % (ref 12.3–15.4)
GLOBULIN SER CALC-MCNC: 2.7 GM/DL
GLUCOSE SERPL-MCNC: 72 MG/DL (ref 65–99)
HCT VFR BLD AUTO: 39.7 % (ref 37.5–51)
HGB BLD-MCNC: 14 G/DL (ref 13–17.7)
IMM GRANULOCYTES # BLD AUTO: 0.02 10*3/MM3 (ref 0–0.05)
IMM GRANULOCYTES NFR BLD AUTO: 0.4 % (ref 0–0.5)
INR PPP: 1.31 (ref 0.9–1.1)
LYMPHOCYTES # BLD AUTO: 0.61 10*3/MM3 (ref 0.7–3.1)
LYMPHOCYTES NFR BLD AUTO: 13 % (ref 19.6–45.3)
MCH RBC QN AUTO: 31.8 PG (ref 26.6–33)
MCHC RBC AUTO-ENTMCNC: 35.3 G/DL (ref 31.5–35.7)
MCV RBC AUTO: 90.2 FL (ref 79–97)
MONOCYTES # BLD AUTO: 0.51 10*3/MM3 (ref 0.1–0.9)
MONOCYTES NFR BLD AUTO: 10.9 % (ref 5–12)
NEUTROPHILS # BLD AUTO: 3.1 10*3/MM3 (ref 1.7–7)
NEUTROPHILS NFR BLD AUTO: 66.1 % (ref 42.7–76)
NRBC BLD AUTO-RTO: 0 /100 WBC (ref 0–0.2)
PLATELET # BLD AUTO: 61 10*3/MM3 (ref 140–450)
POTASSIUM SERPL-SCNC: 4.2 MMOL/L (ref 3.5–5.2)
PROT SERPL-MCNC: 6.8 G/DL (ref 6–8.5)
PROTHROMBIN TIME: 16.5 SECONDS (ref 11.7–14.2)
RBC # BLD AUTO: 4.4 10*6/MM3 (ref 4.14–5.8)
SODIUM SERPL-SCNC: 143 MMOL/L (ref 136–145)
WBC # BLD AUTO: 4.69 10*3/MM3 (ref 3.4–10.8)

## 2023-07-27 ENCOUNTER — TELEPHONE (OUTPATIENT)
Dept: GASTROENTEROLOGY | Facility: CLINIC | Age: 43
End: 2023-07-27
Payer: MEDICARE

## 2023-07-27 DIAGNOSIS — I85.00 ESOPHAGEAL VARICES WITHOUT BLEEDING, UNSPECIFIED ESOPHAGEAL VARICES TYPE: Primary | ICD-10-CM

## 2023-07-27 NOTE — TELEPHONE ENCOUNTER
----- Message from Erik Magaña MD sent at 7/26/2023  4:05 PM EDT -----  Biopsies with gastritis, continue current medication and repeat EGD as directed

## 2023-07-31 ENCOUNTER — TELEPHONE (OUTPATIENT)
Dept: GASTROENTEROLOGY | Facility: CLINIC | Age: 43
End: 2023-07-31
Payer: MEDICARE

## 2023-10-09 ENCOUNTER — ANESTHESIA (OUTPATIENT)
Dept: GASTROENTEROLOGY | Facility: HOSPITAL | Age: 43
End: 2023-10-09
Payer: MEDICARE

## 2023-10-09 ENCOUNTER — ANESTHESIA EVENT (OUTPATIENT)
Dept: GASTROENTEROLOGY | Facility: HOSPITAL | Age: 43
End: 2023-10-09
Payer: MEDICARE

## 2023-10-09 ENCOUNTER — HOSPITAL ENCOUNTER (OUTPATIENT)
Facility: HOSPITAL | Age: 43
Setting detail: HOSPITAL OUTPATIENT SURGERY
Discharge: HOME | End: 2023-10-09
Attending: INTERNAL MEDICINE | Admitting: INTERNAL MEDICINE
Payer: MEDICARE

## 2023-10-09 VITALS
RESPIRATION RATE: 15 BRPM | WEIGHT: 154 LBS | BODY MASS INDEX: 22.81 KG/M2 | HEART RATE: 58 BPM | OXYGEN SATURATION: 99 % | SYSTOLIC BLOOD PRESSURE: 117 MMHG | HEIGHT: 69 IN | DIASTOLIC BLOOD PRESSURE: 89 MMHG

## 2023-10-09 PROCEDURE — 25810000003 LACTATED RINGERS PER 1000 ML: Performed by: INTERNAL MEDICINE

## 2023-10-09 PROCEDURE — 43244 EGD VARICES LIGATION: CPT | Performed by: INTERNAL MEDICINE

## 2023-10-09 PROCEDURE — S0260 H&P FOR SURGERY: HCPCS | Performed by: INTERNAL MEDICINE

## 2023-10-09 PROCEDURE — 25010000002 PROPOFOL 10 MG/ML EMULSION: Performed by: ANESTHESIOLOGY

## 2023-10-09 RX ORDER — SODIUM CHLORIDE 0.9 % (FLUSH) 0.9 %
10 SYRINGE (ML) INJECTION AS NEEDED
Status: DISCONTINUED | OUTPATIENT
Start: 2023-10-09 | End: 2023-10-09 | Stop reason: HOSPADM

## 2023-10-09 RX ORDER — PROPOFOL 10 MG/ML
VIAL (ML) INTRAVENOUS AS NEEDED
Status: DISCONTINUED | OUTPATIENT
Start: 2023-10-09 | End: 2023-10-09 | Stop reason: SURG

## 2023-10-09 RX ORDER — LIDOCAINE HYDROCHLORIDE 20 MG/ML
INJECTION, SOLUTION INFILTRATION; PERINEURAL AS NEEDED
Status: DISCONTINUED | OUTPATIENT
Start: 2023-10-09 | End: 2023-10-09 | Stop reason: SURG

## 2023-10-09 RX ORDER — SODIUM CHLORIDE, SODIUM LACTATE, POTASSIUM CHLORIDE, CALCIUM CHLORIDE 600; 310; 30; 20 MG/100ML; MG/100ML; MG/100ML; MG/100ML
1000 INJECTION, SOLUTION INTRAVENOUS CONTINUOUS
Status: DISCONTINUED | OUTPATIENT
Start: 2023-10-09 | End: 2023-10-09 | Stop reason: HOSPADM

## 2023-10-09 RX ADMIN — LIDOCAINE HYDROCHLORIDE 60 MG: 20 INJECTION, SOLUTION INFILTRATION; PERINEURAL at 09:36

## 2023-10-09 RX ADMIN — PROPOFOL 120 MG: 10 INJECTION, EMULSION INTRAVENOUS at 09:37

## 2023-10-09 RX ADMIN — SODIUM CHLORIDE, POTASSIUM CHLORIDE, SODIUM LACTATE AND CALCIUM CHLORIDE: 600; 310; 30; 20 INJECTION, SOLUTION INTRAVENOUS at 09:23

## 2023-10-09 RX ADMIN — PROPOFOL 160 MG/KG/HR: 10 INJECTION, EMULSION INTRAVENOUS at 09:38

## 2023-10-09 RX ADMIN — SODIUM CHLORIDE, POTASSIUM CHLORIDE, SODIUM LACTATE AND CALCIUM CHLORIDE 1000 ML: 600; 310; 30; 20 INJECTION, SOLUTION INTRAVENOUS at 09:25

## 2023-10-09 NOTE — ANESTHESIA POSTPROCEDURE EVALUATION
"Patient: El Jiang    Procedure Summary       Date: 10/09/23 Room / Location:  [facility] /  [facility]    Anesthesia Start: 0930 Anesthesia Stop: 1003    Procedure: ESOPHAGOGASTRODUODENOSCOPY WITH VARICIES BANDING X 5 (Esophagus) Diagnosis:       Esophageal varices without bleeding, unspecified esophageal varices type      (Esophageal varices without bleeding, unspecified esophageal varices type [I85.00])    Surgeons: Erik Magaña MD Provider: Fernando Hope MD    Anesthesia Type: MAC ASA Status: 3            Anesthesia Type: MAC    Vitals  Vitals Value Taken Time   /89 10/09/23 1015   Temp     Pulse 54 10/09/23 1022   Resp 15 10/09/23 1015   SpO2 98 % 10/09/23 1022   Vitals shown include unfiled device data.        Post Anesthesia Care and Evaluation    Level of consciousness: awake and alert  Pain management: adequate  Anesthetic complications: No anesthetic complications    Cardiovascular status: acceptable  Respiratory status: acceptable  Hydration status: acceptable    Comments: /89   Pulse 58   Resp 15   Ht 175.3 cm (69\")   Wt 69.9 kg (154 lb)   SpO2 99%   BMI 22.74 kg/mý       "

## 2023-10-09 NOTE — BRIEF OP NOTE
ESOPHAGOGASTRODUODENOSCOPY  Progress Note    El Jiang  10/9/2023    Pre-op Diagnosis:   Esophageal varices without bleeding, unspecified esophageal varices type [I85.00]       Post-Op Diagnosis Codes:     * Esophageal varices without bleeding, unspecified esophageal varices type [I85.00]    Procedure/CPTr Codes:        Procedure(s):  ESOPHAGOGASTRODUODENOSCOPY WITH VARICIES BANDING X 5              Surgeon(s):  Erik Magaña MD    Anesthesia: Monitored Anesthesia Care    Staff:   Endo Technician: Radha Candelaria RN  Endo Nurse: Oumou Singh RN; Jaky Dodd RN         Estimated Blood Loss: none    Urine Voided: * No values recorded between 10/9/2023  9:30 AM and 10/9/2023 10:00 AM *    Specimens:                None          Drains: * No LDAs found *    Findings: EGD with band ligation performed revealing mild residual varices grade 2 treated with a total of 5 bands.  Patient tolerated well.  The remainder of the upper GI significant for portal hypertensive gastropathy changes.        Complications: None          Erik Magaña MD     Date: 10/9/2023  Time: 10:01 EDT

## 2023-10-09 NOTE — H&P
"[facility]  Pre Procedure History & Physical    Chief Complaint:   Esophageal varices    Subjective     HPI:   Patient 43-year-old male with history of ascites, alcohol abuse, cirrhosis with varices status post band ligation in July here for follow-up band ligation and EGD.  Patient currently with no complaints.    Past Medical History:   Past Medical History:   Diagnosis Date    Acquired stuttering     WIFE STATES HE CAN BE HARD TO UNDERSTAND.. \"[place] ACCENT AND STUTTERS\"    Bleeding esophageal varices     Cirrhosis     end-stage    H/O PAF (paroxysmal atrial fibrillation) (CMS/HCC)     Hepatosplenomegaly     secondary to cirrhosis    History of alcohol abuse     History of anemia     History of ascites     History of sepsis     Hyperlipidemia        Past Surgical History:  Past Surgical History:   Procedure Laterality Date    ENDOSCOPY N/A 11/18/2020    Procedure: ESOPHAGOGASTRODUODENOSCOPY with esophageal banding x3;  Surgeon: Erik Magaña MD;  Location: [facility] ENDOSCOPY;  Service: Gastroenterology;  Laterality: N/A;  pre - cirrhosis of the liver and varices  post - gastritis, portal hypertensive gastropathy    ENDOSCOPY N/A 03/23/2022    Procedure: ESOPHAGOGASTRODUODENOSCOPY WITH COLD BIOPSIES AND BANDING x 5;  Surgeon: Erik Magaña MD;  Location: [facility] ENDOSCOPY;  Service: Gastroenterology;  Laterality: N/A;  HISTORY OF VARICES  VARICES, EROSIVE GASTRITIS    ENDOSCOPY N/A 06/01/2022    Procedure: ESOPHAGOGASTRODUODENOSCOPY;  Surgeon: Erik Magaña MD;  Location: [facility] ENDOSCOPY;  Service: Gastroenterology;  Laterality: N/A;  pre-hx varices  post-gastritis    ENDOSCOPY N/A 7/7/2023    Procedure: ESOPHAGOGASTRODUODENOSCOPY with varices banding;  Surgeon: Erik Magaña MD;  Location: [facility] ENDOSCOPY;  Service: Gastroenterology;  Laterality: N/A;  pre varices w/o bleeding  post duodenitis varice banding 7 bands deployed    ENDOSCOPY W/ BANDING  2020    " varices    ENDOSCOPY W/ BANDING      HERNIA REPAIR      PARACENTESIS      900 cc fluid drained       Family History:  Family History   Problem Relation Age of Onset    Ovarian cancer Mother     Throat cancer Father     Malig Hyperthermia Neg Hx        Social History:   reports that he has been smoking cigarettes. He has been smoking an average of .5 packs per day. He has never used smokeless tobacco. He reports that he does not currently use alcohol. He reports current drug use. Frequency: 1.00 time per week. Drug: Marijuana.    Medications:   Medications Prior to Admission   Medication Sig Dispense Refill Last Dose    dicyclomine (BENTYL) 10 MG capsule Take 1 capsule by mouth 2 (Two) Times a Day. 60 capsule 11 Past Month    ferrous sulfate 324 (65 Fe) MG tablet delayed-release EC tablet Take 1 tablet by mouth Daily With Breakfast.   10/8/2023    glucosamine-chondroitin 500-400 MG capsule capsule Take  by mouth Daily.   10/8/2023    multivitamin with minerals tablet tablet Take 1 tablet by mouth Daily.   10/8/2023    nadolol (CORGARD) 40 MG tablet Take 1 tablet by mouth Daily. 90 tablet 3 10/8/2023    ondansetron (ZOFRAN) 4 MG tablet Take 1 tablet by mouth Every 6 (Six) Hours As Needed for Nausea or Vomiting. 30 tablet 1 Past Month    pantoprazole (PROTONIX) 40 MG EC tablet Take 1 tablet by mouth 2 (Two) Times a Day. 180 tablet 3 10/8/2023    spironolactone (Aldactone) 25 MG tablet Take 1 tablet by mouth Daily. 90 tablet 3 10/8/2023    traZODone (DESYREL) 50 MG tablet Take 1 tablet by mouth At Night As Needed for Sleep. 30 tablet 0 Past Month    furosemide (LASIX) 40 MG tablet Take 1 tablet by mouth Daily. 90 tablet 3 More than a month    sucralfate (Carafate) 1 GM/10ML suspension Take 10 mL by mouth 4 (Four) Times a Day. 414 mL 1 More than a month       Allergies:  Patient has no known allergies.    ROS:    Pertinent items are noted in HPI     Objective     Blood pressure 108/76, pulse 60, resp. rate 17, height  "175.3 cm (69\"), weight 69.9 kg (154 lb), SpO2 99%.    Physical Exam   Constitutional: Pt is oriented to person, place, and time and well-developed, well-nourished, and in no distress.   Mouth/Throat: Oropharynx is clear and moist.   Neck: Normal range of motion.   Cardiovascular: Normal rate, regular rhythm and normal heart sounds.    Pulmonary/Chest: Effort normal and breath sounds normal.   Abdominal: Soft. Nontender  Skin: Skin is warm and dry.   Psychiatric: Mood, memory, affect and judgment normal.     Assessment & Plan     Diagnosis:  Esophageal varices  Cirrhosis    Anticipated Surgical Procedure:  EGD    The risks, benefits, and alternatives of this procedure have been discussed with the patient or the responsible party- the patient understands and agrees to proceed.                                                          "

## 2024-01-23 ENCOUNTER — TELEPHONE (OUTPATIENT)
Dept: GASTROENTEROLOGY | Facility: CLINIC | Age: 44
End: 2024-01-23
Payer: MEDICARE

## 2024-02-12 ENCOUNTER — PREP FOR SURGERY (OUTPATIENT)
Dept: OTHER | Facility: HOSPITAL | Age: 44
End: 2024-02-12
Payer: MEDICARE

## 2024-02-12 ENCOUNTER — TELEPHONE (OUTPATIENT)
Dept: GASTROENTEROLOGY | Facility: CLINIC | Age: 44
End: 2024-02-12
Payer: MEDICARE

## 2024-02-12 DIAGNOSIS — Z12.11 SCREEN FOR COLON CANCER: Primary | ICD-10-CM

## 2024-02-13 ENCOUNTER — TELEPHONE (OUTPATIENT)
Dept: GASTROENTEROLOGY | Facility: CLINIC | Age: 44
End: 2024-02-13
Payer: MEDICARE

## 2024-02-15 ENCOUNTER — TELEPHONE (OUTPATIENT)
Dept: GASTROENTEROLOGY | Facility: CLINIC | Age: 44
End: 2024-02-15
Payer: MEDICARE

## 2024-02-26 ENCOUNTER — TELEPHONE (OUTPATIENT)
Dept: GASTROENTEROLOGY | Facility: CLINIC | Age: 44
End: 2024-02-26
Payer: MEDICARE

## 2024-02-26 NOTE — TELEPHONE ENCOUNTER
Caller: Sanaz Jiang    Relationship: Emergency Contact    Best call back number: [phone]    What is the best time to reach you: CALL HIS WIFE SINCE HE HAS A [place] ACCENT AND HARD TO UNDERSTAND    Who are you requesting to speak with (clinical staff, provider,  specific staff member): FRONT        What was the call regarding: RECALL- NEEDING TO SCHEDULE BOTH SCOPES

## 2024-02-26 NOTE — TELEPHONE ENCOUNTER
ALINA WELCH   IN SCHEDULING PT SCHEDULED 07/08/2024 ARRIVING AT 9:00AM.[employer] PREP MAILED TO ADDRESS ON FILE VERIFIED BY PT.OK FOR HUB TO READ

## 2024-02-26 NOTE — TELEPHONE ENCOUNTER
Hub staff attempted to follow warm transfer process and was unsuccessful     Caller: Sanaz Jiang    Relationship to patient: Emergency Contact    Best call back number: [phone]    Patient is needing: TO SCHEDULE COLONOSCOPY AND EGD

## 2024-02-26 NOTE — TELEPHONE ENCOUNTER
ALINA WELCH   IN SCHEDULING PT SCHEDULED 07/08/2024 ARRIVING AT 9:00AM.[employer] PREP MAILED TO ADDRESS ON FILE VERIFIED BY PT.OK FOR HUB TO READ

## 2024-02-26 NOTE — TELEPHONE ENCOUNTER
Caller: Sanaz Jiang    Relationship: Emergency Contact    Best call back number: [phone]    What is the best time to reach you: CALL HIS WIFE SINCE HE HAS A [place] ACCENT AND HARD TO UNDERSTAND    Who are you requesting to speak with (clinical staff, provider,  specific staff member): FRONT        What was the call regarding: RECALL- NEEDING TO SCHEDULE BOTH SCOPES

## 2024-02-29 ENCOUNTER — TELEPHONE (OUTPATIENT)
Dept: GASTROENTEROLOGY | Facility: CLINIC | Age: 44
End: 2024-02-29
Payer: MEDICARE

## 2024-03-01 ENCOUNTER — TELEPHONE (OUTPATIENT)
Dept: GASTROENTEROLOGY | Facility: CLINIC | Age: 44
End: 2024-03-01
Payer: MEDICARE

## 2024-03-01 NOTE — TELEPHONE ENCOUNTER
Called patient's spouse. Advised patient's next EGD is due in October of this year. She questions if the patient can either have EGD earlier or can his colonoscopy wait until October when his EGD is due. Advised will send an update to Dr. Magaña.  Advised he is out of the office until the week of 4/11. She verb understanding and is ok with waiting for an answer.

## 2024-03-01 NOTE — TELEPHONE ENCOUNTER
CALLED SCHEDULE PT PT WIFE STATES SHE WANTS HIM TO HAVE EGD WITH HIS C-SCOPE AND WANTS BOTH SCHEDULED TOGETHER.STATES SHE SW SOMEONE AND IT WAS SUPPOSED TO BE SCHEDULED.   THANK YOU

## 2024-03-01 NOTE — TELEPHONE ENCOUNTER
SW PT SPOUSE AND SHE VERBALIZES THEY ARE GOING TO WAIT TO SCHEDULE WHEN PT CAN HAVE BOTH PROCEDURES DONE.OK FOR HUB TO READ

## 2024-03-20 ENCOUNTER — TELEPHONE (OUTPATIENT)
Dept: GASTROENTEROLOGY | Facility: CLINIC | Age: 44
End: 2024-03-20
Payer: MEDICARE

## 2024-03-20 NOTE — TELEPHONE ENCOUNTER
CALLED TO SCHEDULE PT ,PT SPOUSE STATED SHE WOULD CALL BACK AND SCHEDULE BUSY AT THIS TIME.OK FOR HUB TO READ

## 2024-03-25 ENCOUNTER — TELEPHONE (OUTPATIENT)
Dept: GASTROENTEROLOGY | Facility: CLINIC | Age: 44
End: 2024-03-25
Payer: MEDICARE

## 2024-03-25 PROBLEM — I85.00 ESOPHAGEAL VARICES WITHOUT BLEEDING: Status: ACTIVE | Noted: 2024-03-01

## 2024-03-25 PROBLEM — Z12.11 SCREENING FOR COLON CANCER: Status: ACTIVE | Noted: 2024-03-01

## 2024-03-25 NOTE — TELEPHONE ENCOUNTER
ALINA BOO IN SCHEDULING PT SCHEDULED 07/31/2024 ARRIVING AT `1230PM.[employer] /EGD PREP INSTRUCTIONS MAILED TO ADDRESS ON FILE OK FOR HUB TO RELAY

## 2024-04-12 RX ORDER — PANTOPRAZOLE SODIUM 40 MG/1
40 TABLET, DELAYED RELEASE ORAL 2 TIMES DAILY
Qty: 180 TABLET | Refills: 3 | Status: SHIPPED | OUTPATIENT
Start: 2024-04-12

## 2024-04-12 RX ORDER — NADOLOL 40 MG/1
40 TABLET ORAL DAILY
Qty: 90 TABLET | Refills: 3 | Status: SHIPPED | OUTPATIENT
Start: 2024-04-12

## 2024-04-12 RX ORDER — SPIRONOLACTONE 25 MG/1
25 TABLET ORAL DAILY
Qty: 90 TABLET | Refills: 3 | Status: SHIPPED | OUTPATIENT
Start: 2024-04-12

## 2024-05-24 RX ORDER — FUROSEMIDE 40 MG/1
40 TABLET ORAL DAILY
Qty: 90 TABLET | Refills: 3 | Status: SHIPPED | OUTPATIENT
Start: 2024-05-24

## 2024-07-25 ENCOUNTER — TELEPHONE (OUTPATIENT)
Dept: GASTROENTEROLOGY | Facility: CLINIC | Age: 44
End: 2024-07-25
Payer: MEDICARE

## 2024-07-25 NOTE — TELEPHONE ENCOUNTER
OK FOR HUB TO RELAY   TRIED TO CALL PT TO CONFIRM APT FOR COLONOSCOPY AND EGD ON 7/31/24 AT 1230 PM WITH DR. MENA AT [facility]. PT HAD NO VOICEMAIL SET UP TO LEAVE A MESSAGE.

## 2024-07-29 ENCOUNTER — TELEPHONE (OUTPATIENT)
Dept: GASTROENTEROLOGY | Facility: CLINIC | Age: 44
End: 2024-07-29
Payer: MEDICARE

## 2024-07-29 ENCOUNTER — TELEPHONE (OUTPATIENT)
Dept: GASTROENTEROLOGY | Facility: CLINIC | Age: 44
End: 2024-07-29

## 2024-07-29 NOTE — TELEPHONE ENCOUNTER
Hub staff attempted to follow warm transfer process and was unsuccessful     Caller: Sanaz Jiang    Relationship to patient: Emergency Contact    Best call back number: [phone]    Patient is needing: PT WIFE IS CALLING TO SEE IF THE OFFICE CAN SEE IF THE PREP INSTRUCTION TO HIS MYCHART. ALSO WHAT KIND OF PREP HE NEEDS TO TAKE. PT IS SCHEDULE FOR PROCEDURE 07/31/2024. IF NEEDING TO CALL PT WIFE BACK AND NOT ABLE TO REACH HER IT IS OKAY TO LVM.

## 2024-07-31 ENCOUNTER — HOSPITAL ENCOUNTER (OUTPATIENT)
Facility: HOSPITAL | Age: 44
Setting detail: HOSPITAL OUTPATIENT SURGERY
Discharge: HOME | End: 2024-07-31
Attending: INTERNAL MEDICINE | Admitting: INTERNAL MEDICINE
Payer: MEDICARE

## 2024-07-31 ENCOUNTER — ANESTHESIA (OUTPATIENT)
Dept: GASTROENTEROLOGY | Facility: HOSPITAL | Age: 44
End: 2024-07-31
Payer: MEDICARE

## 2024-07-31 ENCOUNTER — ANESTHESIA EVENT (OUTPATIENT)
Dept: GASTROENTEROLOGY | Facility: HOSPITAL | Age: 44
End: 2024-07-31
Payer: MEDICARE

## 2024-07-31 VITALS
DIASTOLIC BLOOD PRESSURE: 70 MMHG | OXYGEN SATURATION: 100 % | WEIGHT: 137 LBS | BODY MASS INDEX: 20.29 KG/M2 | HEART RATE: 61 BPM | HEIGHT: 69 IN | SYSTOLIC BLOOD PRESSURE: 110 MMHG | RESPIRATION RATE: 16 BRPM

## 2024-07-31 DIAGNOSIS — I85.00 ESOPHAGEAL VARICES WITHOUT BLEEDING, UNSPECIFIED ESOPHAGEAL VARICES TYPE: ICD-10-CM

## 2024-07-31 DIAGNOSIS — Z12.11 SCREENING FOR COLON CANCER: ICD-10-CM

## 2024-07-31 PROCEDURE — 25010000002 PROPOFOL 200 MG/20ML EMULSION: Performed by: NURSE ANESTHETIST, CERTIFIED REGISTERED

## 2024-07-31 PROCEDURE — S0260 H&P FOR SURGERY: HCPCS | Performed by: INTERNAL MEDICINE

## 2024-07-31 PROCEDURE — 25810000003 LACTATED RINGERS PER 1000 ML: Performed by: INTERNAL MEDICINE

## 2024-07-31 PROCEDURE — 88305 TISSUE EXAM BY PATHOLOGIST: CPT | Performed by: INTERNAL MEDICINE

## 2024-07-31 PROCEDURE — 25010000002 GLYCOPYRROLATE 0.2 MG/ML SOLUTION: Performed by: NURSE ANESTHETIST, CERTIFIED REGISTERED

## 2024-07-31 PROCEDURE — 45385 COLONOSCOPY W/LESION REMOVAL: CPT | Performed by: INTERNAL MEDICINE

## 2024-07-31 PROCEDURE — 43239 EGD BIOPSY SINGLE/MULTIPLE: CPT | Performed by: INTERNAL MEDICINE

## 2024-07-31 PROCEDURE — 25010000002 PROPOFOL 1000 MG/100ML EMULSION: Performed by: NURSE ANESTHETIST, CERTIFIED REGISTERED

## 2024-07-31 PROCEDURE — 45380 COLONOSCOPY AND BIOPSY: CPT | Performed by: INTERNAL MEDICINE

## 2024-07-31 RX ORDER — GLYCOPYRROLATE 0.2 MG/ML
INJECTION INTRAMUSCULAR; INTRAVENOUS AS NEEDED
Status: DISCONTINUED | OUTPATIENT
Start: 2024-07-31 | End: 2024-07-31 | Stop reason: SURG

## 2024-07-31 RX ORDER — PROPOFOL 10 MG/ML
INJECTION, EMULSION INTRAVENOUS CONTINUOUS PRN
Status: DISCONTINUED | OUTPATIENT
Start: 2024-07-31 | End: 2024-07-31 | Stop reason: SURG

## 2024-07-31 RX ORDER — SODIUM CHLORIDE, SODIUM LACTATE, POTASSIUM CHLORIDE, CALCIUM CHLORIDE 600; 310; 30; 20 MG/100ML; MG/100ML; MG/100ML; MG/100ML
30 INJECTION, SOLUTION INTRAVENOUS CONTINUOUS PRN
Status: DISCONTINUED | OUTPATIENT
Start: 2024-07-31 | End: 2024-07-31 | Stop reason: HOSPADM

## 2024-07-31 RX ORDER — LIDOCAINE HYDROCHLORIDE 20 MG/ML
INJECTION, SOLUTION INFILTRATION; PERINEURAL AS NEEDED
Status: DISCONTINUED | OUTPATIENT
Start: 2024-07-31 | End: 2024-07-31 | Stop reason: SURG

## 2024-07-31 RX ORDER — PROPOFOL 10 MG/ML
INJECTION, EMULSION INTRAVENOUS AS NEEDED
Status: DISCONTINUED | OUTPATIENT
Start: 2024-07-31 | End: 2024-07-31 | Stop reason: SURG

## 2024-07-31 RX ADMIN — LIDOCAINE HYDROCHLORIDE 60 MG: 20 INJECTION, SOLUTION INFILTRATION; PERINEURAL at 14:47

## 2024-07-31 RX ADMIN — GLYCOPYRROLATE 0.2 MG: 0.2 INJECTION INTRAMUSCULAR; INTRAVENOUS at 14:44

## 2024-07-31 RX ADMIN — PROPOFOL 180 MCG/KG/MIN: 10 INJECTION, EMULSION INTRAVENOUS at 14:47

## 2024-07-31 RX ADMIN — SODIUM CHLORIDE, POTASSIUM CHLORIDE, SODIUM LACTATE AND CALCIUM CHLORIDE 30 ML/HR: 600; 310; 30; 20 INJECTION, SOLUTION INTRAVENOUS at 13:45

## 2024-07-31 RX ADMIN — PROPOFOL INJECTABLE EMULSION 100 MG: 10 INJECTION, EMULSION INTRAVENOUS at 14:46

## 2024-07-31 NOTE — ANESTHESIA POSTPROCEDURE EVALUATION
Patient: El Jiang    Procedure Summary       Date: 07/31/24 Room / Location: [facility] ENDOSCOPY 5 / [facility] ENDOSCOPY    Anesthesia Start: 1443 Anesthesia Stop: 1510    Procedures:       COLONOSCOPY to cecum ti with hot snare polypectomies/biopsyies      ESOPHAGOGASTRODUODENOSCOPY with cold biopsies (Esophagus) Diagnosis:       Esophageal varices without bleeding, unspecified esophageal varices type      Screening for colon cancer      (Esophageal varices without bleeding, unspecified esophageal varices type [I85.00])      (Screening for colon cancer [Z12.11])    Surgeons: Erik Magaña MD Provider: Gerardo Wray MD    Anesthesia Type: MAC ASA Status: 3            Anesthesia Type: MAC    Vitals  No vitals data found for the desired time range.          Post Anesthesia Care and Evaluation    Patient location during evaluation: PACU  Patient participation: complete - patient participated  Level of consciousness: awake and alert  Pain management: adequate    Airway patency: patent  Anesthetic complications: No anesthetic complications    Cardiovascular status: acceptable  Respiratory status: acceptable  Hydration status: acceptable    Comments: --------------------            07/31/24               1337     --------------------   BP:       110/75     Pulse:      57       Resp:       16       SpO2:      100%     --------------------

## 2024-07-31 NOTE — BRIEF OP NOTE
COLONOSCOPY, ESOPHAGOGASTRODUODENOSCOPY  Progress Note    El Jiang  7/31/2024    Pre-op Diagnosis:   Esophageal varices without bleeding, unspecified esophageal varices type [I85.00]  Screening for colon cancer [Z12.11]       Post-Op Diagnosis Codes:     * Esophageal varices without bleeding, unspecified esophageal varices type [I85.00]     * Screening for colon cancer [Z12.11]     * Colon polyps [K63.5]     * Internal hemorrhoids [K64.8]     * Gastritis [K29.70]     * Varices, esophageal [I85.00]     * Duodenitis [K29.8]    Procedure/CPT® Codes:        Procedure(s):  COLONOSCOPY to cecum ti with hot snare polypectomies/biopsyies  ESOPHAGOGASTRODUODENOSCOPY with cold biopsies              Surgeon(s):  Erik Magaña MD    Anesthesia: Monitored Anesthesia Care    Staff:   Endo Technician: Gaye Tamayo RN  Endo Nurse: Nancy Aguirre RN         Estimated Blood Loss: minimal    Urine Voided: * No values recorded between 7/31/2024  2:44 PM and 7/31/2024  3:11 PM *    Specimens:                Specimens       ID Source Type Tests Collected By Collected At Frozen?    A Large Intestine, Right / Ascending Colon Polyp TISSUE PATHOLOGY EXAM   Erik Magaña MD 7/31/24 1452 No    Description: ascend colon polyp    This specimen was not marked as sent.    B Large Intestine, Transverse Colon Polyp TISSUE PATHOLOGY EXAM   Erik Magaña MD 7/31/24 1454 No    Description: tv colon polyp    This specimen was not marked as sent.    C Large Intestine, Rectum Polyp TISSUE PATHOLOGY EXAM   Erik Magaña MD 7/31/24 1500 No    Description: rectal polyp    This specimen was not marked as sent.    D Gastric, Antrum Tissue TISSUE PATHOLOGY EXAM   Erik Magaña MD 7/31/24 1508 No    Description: gastric biopsies    This specimen was not marked as sent.                  Drains: * No LDAs found *    Findings: Colonoscopy to the terminal ileum with normal ileal mucosa.  Colon polyps in the  ascending transverse and rectum removed hot snare and cold biopsy.  Internal hemorrhoids noted as well.  EGD with biopsy with mild duodenitis of the duodenal bulb.  Antral gastritis that was nodular was noted biopsies were obtained.  Retroflexed view the GE junction was normal with no evident varices in the gastric fundus or cardia.  Old scarring in the distal esophagus consistent with his previous banding and trace to grade 1 varices in the distal esophagus remain.  The remainder the esophageal mucosa was normal.        Complications: None          Erik Magaña MD     Date: 7/31/2024  Time: 15:13 EDT

## 2024-07-31 NOTE — H&P
"[facility]  Pre Procedure History & Physical    Chief Complaint:   Colonoscopy screening and history of esophageal varices    Subjective     HPI:   Patient 44-year-old male with history of with alcohol-related cirrhosis, hyperlipidemia being evaluated for transplant of the liver here for colonoscopy screening and surveillance of esophageal varices.    Past Medical History:   Past Medical History:   Diagnosis Date    Acquired stuttering     WIFE STATES HE CAN BE HARD TO UNDERSTAND.. \"[place] ACCENT AND STUTTERS\"    Bleeding esophageal varices     Cirrhosis     end-stage    H/O PAF (paroxysmal atrial fibrillation) (CMS/HCC)     Hepatosplenomegaly     secondary to cirrhosis    History of alcohol abuse     History of anemia     History of ascites     History of sepsis     Hyperlipidemia        Past Surgical History:  Past Surgical History:   Procedure Laterality Date    ENDOSCOPY N/A 11/18/2020    Procedure: ESOPHAGOGASTRODUODENOSCOPY with esophageal banding x3;  Surgeon: Erik Magaña MD;  Location: [facility] ENDOSCOPY;  Service: Gastroenterology;  Laterality: N/A;  pre - cirrhosis of the liver and varices  post - gastritis, portal hypertensive gastropathy    ENDOSCOPY N/A 03/23/2022    Procedure: ESOPHAGOGASTRODUODENOSCOPY WITH COLD BIOPSIES AND BANDING x 5;  Surgeon: Erik Magaña MD;  Location: [facility] ENDOSCOPY;  Service: Gastroenterology;  Laterality: N/A;  HISTORY OF VARICES  VARICES, EROSIVE GASTRITIS    ENDOSCOPY N/A 06/01/2022    Procedure: ESOPHAGOGASTRODUODENOSCOPY;  Surgeon: Erik Magaña MD;  Location: [facility] ENDOSCOPY;  Service: Gastroenterology;  Laterality: N/A;  pre-hx varices  post-gastritis    ENDOSCOPY N/A 7/7/2023    Procedure: ESOPHAGOGASTRODUODENOSCOPY with varices banding;  Surgeon: Erik Magaña MD;  Location: [facility] ENDOSCOPY;  Service: Gastroenterology;  Laterality: N/A;  pre varices w/o bleeding  post duodenitis varice banding 7 bands deployed "    ENDOSCOPY N/A 10/9/2023    Procedure: ESOPHAGOGASTRODUODENOSCOPY WITH VARICIES BANDING X 5;  Surgeon: Erik Magaña MD;  Location: [facility] ENDOSCOPY;  Service: Gastroenterology;  Laterality: N/A;  PRE: HISTORY OF VARICIES /   POST: VARICIES    ENDOSCOPY W/ BANDING  2020    varices    ENDOSCOPY W/ BANDING      HERNIA REPAIR      PARACENTESIS      900 cc fluid drained       Family History:  Family History   Problem Relation Age of Onset    Ovarian cancer Mother     Throat cancer Father     Malig Hyperthermia Neg Hx        Social History:   reports that he has been smoking cigarettes. He has never used smokeless tobacco. He reports that he does not currently use alcohol. He reports current drug use. Frequency: 1.00 time per week. Drug: Marijuana.    Medications:   Medications Prior to Admission   Medication Sig Dispense Refill Last Dose    furosemide (LASIX) 40 MG tablet TAKE 1 TABLET BY MOUTH DAILY 90 tablet 3 7/30/2024    glucosamine-chondroitin 500-400 MG capsule capsule Take  by mouth Daily.   7/30/2024    multivitamin with minerals tablet tablet Take 1 tablet by mouth Daily.   7/30/2024    nadolol (CORGARD) 40 MG tablet TAKE 1 TABLET BY MOUTH DAILY 90 tablet 3 7/30/2024    pantoprazole (PROTONIX) 40 MG EC tablet TAKE 1 TABLET BY MOUTH TWICE DAILY 180 tablet 3 7/30/2024    spironolactone (ALDACTONE) 25 MG tablet TAKE 1 TABLET BY MOUTH DAILY 90 tablet 3 7/30/2024    dicyclomine (BENTYL) 10 MG capsule Take 1 capsule by mouth 2 (Two) Times a Day. 60 capsule 11     ferrous sulfate 324 (65 Fe) MG tablet delayed-release EC tablet Take 1 tablet by mouth Daily With Breakfast.       ondansetron (ZOFRAN) 4 MG tablet Take 1 tablet by mouth Every 6 (Six) Hours As Needed for Nausea or Vomiting. 30 tablet 1 More than a month    sucralfate (Carafate) 1 GM/10ML suspension Take 10 mL by mouth 4 (Four) Times a Day. 414 mL 1     traZODone (DESYREL) 50 MG tablet Take 1 tablet by mouth At Night As Needed for Sleep. 30 tablet 0  "       Allergies:  Patient has no known allergies.    ROS:    Pertinent items are noted in HPI     Objective     Blood pressure 110/75, pulse 57, resp. rate 16, height 175.3 cm (69\"), weight 62.1 kg (137 lb), SpO2 100%.    Physical Exam   Constitutional: Pt is oriented to person, place, and time and well-developed, well-nourished, and in no distress.   Mouth/Throat: Oropharynx is clear and moist.   Neck: Normal range of motion.   Cardiovascular: Normal rate, regular rhythm and normal heart sounds.    Pulmonary/Chest: Effort normal and breath sounds normal.   Abdominal: Soft. Nontender  Skin: Skin is warm and dry.   Psychiatric: Mood, memory, affect and judgment normal.     Assessment & Plan     Diagnosis:  History of varices  Colonoscopy screening    Anticipated Surgical Procedure:  EGD and colonoscopy    The risks, benefits, and alternatives of this procedure have been discussed with the patient or the responsible party- the patient understands and agrees to proceed.                                                          "

## 2024-07-31 NOTE — ANESTHESIA PREPROCEDURE EVALUATION
Anesthesia Evaluation     Patient summary reviewed and Nursing notes reviewed                Airway   Mallampati: II  Dental    (+) poor dentition    Pulmonary    (+) a smoker Current, COPD,  Cardiovascular     Patient on routine beta blocker  Rhythm: regular  Rate: normal    (+) dysrhythmias Paroxysmal Atrial Fib, hyperlipidemia      Neuro/Psych- negative ROS  GI/Hepatic/Renal/Endo    (+) GI bleeding , liver disease cirrhosis    Musculoskeletal (-) negative ROS    Abdominal    Substance History   (+) alcohol use (Liver cirrhosis with esophogeal varices)     OB/GYN negative ob/gyn ROS         Other                      Anesthesia Plan    ASA 3     MAC     (Alcoholic cirrhosis  Stutters with speach)  intravenous induction     Anesthetic plan, risks, benefits, and alternatives have been provided, discussed and informed consent has been obtained with: patient.    CODE STATUS:

## 2024-08-01 LAB
LAB AP CASE REPORT: NORMAL
PATH REPORT.FINAL DX SPEC: NORMAL
PATH REPORT.GROSS SPEC: NORMAL

## 2024-08-12 ENCOUNTER — TELEPHONE (OUTPATIENT)
Dept: GASTROENTEROLOGY | Facility: CLINIC | Age: 44
End: 2024-08-12
Payer: MEDICARE

## 2024-08-12 NOTE — TELEPHONE ENCOUNTER
----- Message from Erik Magaña sent at 8/3/2024 12:39 PM EDT -----  Benign polyps, repeat colon 5 years.  Gastric biopsies with mild gastritis continue meds.  F/U with transplant

## 2024-08-12 NOTE — TELEPHONE ENCOUNTER
Sent pt [employer] msg advising of results and recommendations. Advised to call if any questions.     Colonoscopy placed in  and recall for 7/31/29.

## 2024-10-15 DIAGNOSIS — K74.60 CIRRHOSIS OF LIVER WITHOUT ASCITES, UNSPECIFIED HEPATIC CIRRHOSIS TYPE: Primary | ICD-10-CM

## 2024-10-15 RX ORDER — PANTOPRAZOLE SODIUM 40 MG/1
40 TABLET, DELAYED RELEASE ORAL 2 TIMES DAILY
Qty: 180 TABLET | Refills: 3 | Status: SHIPPED | OUTPATIENT
Start: 2024-10-15

## 2024-10-15 RX ORDER — FUROSEMIDE 40 MG
40 TABLET ORAL DAILY
Qty: 90 TABLET | Refills: 3 | Status: SHIPPED | OUTPATIENT
Start: 2024-10-15

## 2024-10-15 RX ORDER — SPIRONOLACTONE 25 MG/1
25 TABLET ORAL DAILY
Qty: 90 TABLET | Refills: 3 | Status: SHIPPED | OUTPATIENT
Start: 2024-10-15

## 2024-10-15 RX ORDER — NADOLOL 40 MG/1
40 TABLET ORAL DAILY
Qty: 90 TABLET | Refills: 3 | Status: SHIPPED | OUTPATIENT
Start: 2024-10-15

## 2024-11-13 ENCOUNTER — TELEPHONE (OUTPATIENT)
Dept: GASTROENTEROLOGY | Facility: CLINIC | Age: 44
End: 2024-11-13
Payer: MEDICARE

## 2024-11-13 NOTE — TELEPHONE ENCOUNTER
Called patient and his wife answered, I asked her to have her  call, she said send him a [employer] message, so I did.

## 2024-11-18 ENCOUNTER — LAB (OUTPATIENT)
Dept: LAB | Facility: HOSPITAL | Age: 44
End: 2024-11-18
Payer: MEDICARE

## 2024-11-18 LAB
ALBUMIN SERPL-MCNC: 4 G/DL (ref 3.5–5.2)
ALBUMIN/GLOB SERPL: 1.5 G/DL
ALP SERPL-CCNC: 58 U/L (ref 39–117)
ALPHA-FETOPROTEIN: <2 NG/ML (ref 0–8.3)
ALT SERPL W P-5'-P-CCNC: 20 U/L (ref 1–41)
ANION GAP SERPL CALCULATED.3IONS-SCNC: 7.4 MMOL/L (ref 5–15)
AST SERPL-CCNC: 24 U/L (ref 1–40)
BILIRUB SERPL-MCNC: 1.4 MG/DL (ref 0–1.2)
BUN SERPL-MCNC: 11 MG/DL (ref 6–20)
BUN/CREAT SERPL: 15.7 (ref 7–25)
CALCIUM SPEC-SCNC: 9 MG/DL (ref 8.6–10.5)
CHLORIDE SERPL-SCNC: 106 MMOL/L (ref 98–107)
CO2 SERPL-SCNC: 26.6 MMOL/L (ref 22–29)
CREAT SERPL-MCNC: 0.7 MG/DL (ref 0.76–1.27)
DEPRECATED RDW RBC AUTO: 43.1 FL (ref 37–54)
EGFRCR SERPLBLD CKD-EPI 2021: 115.1 ML/MIN/1.73
ERYTHROCYTE [DISTWIDTH] IN BLOOD BY AUTOMATED COUNT: 13.4 % (ref 12.3–15.4)
GLOBULIN UR ELPH-MCNC: 2.7 GM/DL
GLUCOSE SERPL-MCNC: 105 MG/DL (ref 65–99)
HCT VFR BLD AUTO: 39.8 % (ref 37.5–51)
HGB BLD-MCNC: 13.7 G/DL (ref 13–17.7)
INR PPP: 1.37 (ref 0.9–1.1)
MCH RBC QN AUTO: 30.2 PG (ref 26.6–33)
MCHC RBC AUTO-ENTMCNC: 34.4 G/DL (ref 31.5–35.7)
MCV RBC AUTO: 87.7 FL (ref 79–97)
PLATELET # BLD AUTO: 76 10*3/MM3 (ref 140–450)
PMV BLD AUTO: 11.6 FL (ref 6–12)
POTASSIUM SERPL-SCNC: 4 MMOL/L (ref 3.5–5.2)
PROT SERPL-MCNC: 6.7 G/DL (ref 6–8.5)
PROTHROMBIN TIME: 17.1 SECONDS (ref 11.7–14.2)
RBC # BLD AUTO: 4.54 10*6/MM3 (ref 4.14–5.8)
SODIUM SERPL-SCNC: 140 MMOL/L (ref 136–145)
WBC NRBC COR # BLD AUTO: 5.78 10*3/MM3 (ref 3.4–10.8)

## 2024-11-18 PROCEDURE — 85027 COMPLETE CBC AUTOMATED: CPT | Performed by: NURSE PRACTITIONER

## 2024-11-18 PROCEDURE — 82105 ALPHA-FETOPROTEIN SERUM: CPT | Performed by: NURSE PRACTITIONER

## 2024-11-18 PROCEDURE — 80053 COMPREHEN METABOLIC PANEL: CPT | Performed by: NURSE PRACTITIONER

## 2024-11-18 PROCEDURE — 85610 PROTHROMBIN TIME: CPT | Performed by: NURSE PRACTITIONER

## 2025-02-07 ENCOUNTER — TELEPHONE (OUTPATIENT)
Dept: GASTROENTEROLOGY | Facility: CLINIC | Age: 45
End: 2025-02-07
Payer: MEDICARE

## 2025-02-07 NOTE — TELEPHONE ENCOUNTER
Ok for the hub to relay and schedule.  Left vm for the pt to call back and schedule follow up appt in the office to see Lakisha Garcia

## 2025-02-17 ENCOUNTER — TELEPHONE (OUTPATIENT)
Dept: GASTROENTEROLOGY | Facility: CLINIC | Age: 45
End: 2025-02-17

## 2025-02-17 DIAGNOSIS — K74.60 CIRRHOSIS OF LIVER WITHOUT ASCITES, UNSPECIFIED HEPATIC CIRRHOSIS TYPE: Primary | ICD-10-CM

## 2025-02-17 NOTE — TELEPHONE ENCOUNTER
Caller: Sanaz Jiang    Relationship to patient: Emergency Contact    Best call back number: [phone]     Patient is needing: PATIENT WENT TO HAVE AN US TODAY BUT THEY DIDN'T  HAVE AN ORDER IN HIS CHART.  PATIENT STATES HE SCHEDULED HIS US ONLINE.  PLEASE CALL BACK TO ADVISE.

## 2025-03-10 ENCOUNTER — HOSPITAL ENCOUNTER (OUTPATIENT)
Facility: HOSPITAL | Age: 45
Discharge: HOME | End: 2025-03-10
Admitting: NURSE PRACTITIONER
Payer: MEDICARE

## 2025-03-10 ENCOUNTER — RESULTS FOLLOW-UP (OUTPATIENT)
Facility: HOSPITAL | Age: 45
End: 2025-03-10
Payer: MEDICARE

## 2025-03-10 DIAGNOSIS — K74.60 CIRRHOSIS OF LIVER WITHOUT ASCITES, UNSPECIFIED HEPATIC CIRRHOSIS TYPE: ICD-10-CM

## 2025-03-10 PROCEDURE — 76705 ECHO EXAM OF ABDOMEN: CPT

## 2025-03-10 NOTE — PROGRESS NOTES
Please let the patient know US shows stable cirrhosis.  No worrisome liver lesion.  There is evidence of gallstones with some gallbladder wall thickening.  Please check to see if he is having an abdominal pain.  Thanks Lakisha

## 2025-03-14 ENCOUNTER — OFFICE VISIT (OUTPATIENT)
Dept: GASTROENTEROLOGY | Facility: CLINIC | Age: 45
End: 2025-03-14
Payer: MEDICARE

## 2025-03-14 VITALS
HEART RATE: 69 BPM | DIASTOLIC BLOOD PRESSURE: 80 MMHG | WEIGHT: 142.4 LBS | TEMPERATURE: 98.2 F | HEIGHT: 69 IN | BODY MASS INDEX: 21.09 KG/M2 | SYSTOLIC BLOOD PRESSURE: 117 MMHG

## 2025-03-14 DIAGNOSIS — Z86.0100 PERSONAL HISTORY OF COLON POLYPS, UNSPECIFIED: ICD-10-CM

## 2025-03-14 DIAGNOSIS — K80.20 GALLSTONES: ICD-10-CM

## 2025-03-14 DIAGNOSIS — R10.10 UPPER ABDOMINAL PAIN: ICD-10-CM

## 2025-03-14 DIAGNOSIS — K74.60 CIRRHOSIS OF LIVER WITHOUT ASCITES, UNSPECIFIED HEPATIC CIRRHOSIS TYPE: Primary | ICD-10-CM

## 2025-03-14 DIAGNOSIS — I85.10 SECONDARY ESOPHAGEAL VARICES WITHOUT BLEEDING: ICD-10-CM

## 2025-03-14 PROCEDURE — 99214 OFFICE O/P EST MOD 30 MIN: CPT | Performed by: NURSE PRACTITIONER

## 2025-03-14 PROCEDURE — 1160F RVW MEDS BY RX/DR IN RCRD: CPT | Performed by: NURSE PRACTITIONER

## 2025-03-14 PROCEDURE — 1159F MED LIST DOCD IN RCRD: CPT | Performed by: NURSE PRACTITIONER

## 2025-03-14 NOTE — PROGRESS NOTES
"Chief Complaint   Patient presents with    Cirrhosis of the liver       HPI    El Jiang is a  45 y.o. male here for a follow up visit for alcohol related cirrhosis.    This is a former patient of Dr. Magaña's that is known to me.    He has a history of decompensation with ascites and esophageal variceal bleed now recompensated.  He also follows with transplant hepatology at [facility].  He has been abstinent from alcohol since 2020.  MELD score has remained low and has not been recommended to undergo liver transplant.    Visit today he reports intermittent periumbilical pain that radiates to the epigastric area described as a mild aching sensation that comes and goes onset roughly 6 months ago.  He cannot be sure his pain is related to eating or not.  Denies nausea, vomiting, dysphagia, odynophagia, but does report poor appetite.  His weight has been stable.  He reports adequate control of dyspeptic symptoms on twice daily PPI therapy.    No evidence of ascites, jaundice or bilateral lower extremity edema.  No reports of icterus.  He is compliant with medication regimen to treat cirrhosis which includes beta-blocker, Lasix and Aldactone.    His bowel movements are daily without diarrhea, constipation, rectal bleeding or rectal pain.    US Liver (03/10/2025 09:44) - cirrhosis.  Splenomegaly.  No focal liver lesion to suggest hepatoma.  Gallstones with gallbladder wall thickening and pericholecystic fluid concerning for acute cholecystitis.    Endoscopic history reviewed:    Colonoscopy (07/31/2024 14:43) - Normal ileum.  Polyps.  Hemorrhoids.  Recall 5 years.    Upper GI Endoscopy (07/31/2024 14:40) - Small grade 1 esophageal varices.  Erythematous mucosa in the antrum.  Erythematous duodenopathy.  Recall 1 year for surveillance.    Past Medical History:   Diagnosis Date    Acquired stuttering     WIFE STATES HE CAN BE HARD TO UNDERSTAND.. \"[place] ACCENT AND STUTTERS\"    Alcoholism     Bleeding " esophageal varices     Cholelithiasis     Cirrhosis     end-stage    H/O PAF (paroxysmal atrial fibrillation) (CMS/HCC)     Hepatosplenomegaly     secondary to cirrhosis    Hernia     History of alcohol abuse     History of anemia     History of ascites     History of sepsis     Hyperlipidemia        Past Surgical History:   Procedure Laterality Date    COLONOSCOPY N/A 07/31/2024    Procedure: COLONOSCOPY to cecum ti with hot snare polypectomies/biopsyies;  Surgeon: Erik Magaña MD;  Location: [facility] ENDOSCOPY;  Service: Gastroenterology;  Laterality: N/A;  pre  post polyps    ENDOSCOPY N/A 11/18/2020    Procedure: ESOPHAGOGASTRODUODENOSCOPY with esophageal banding x3;  Surgeon: Erik Magaña MD;  Location: [facility] ENDOSCOPY;  Service: Gastroenterology;  Laterality: N/A;  pre - cirrhosis of the liver and varices  post - gastritis, portal hypertensive gastropathy    ENDOSCOPY N/A 03/23/2022    Procedure: ESOPHAGOGASTRODUODENOSCOPY WITH COLD BIOPSIES AND BANDING x 5;  Surgeon: Erik Magaña MD;  Location: [facility] ENDOSCOPY;  Service: Gastroenterology;  Laterality: N/A;  HISTORY OF VARICES  VARICES, EROSIVE GASTRITIS    ENDOSCOPY N/A 06/01/2022    Procedure: ESOPHAGOGASTRODUODENOSCOPY;  Surgeon: Erik Magaña MD;  Location: [facility] ENDOSCOPY;  Service: Gastroenterology;  Laterality: N/A;  pre-hx varices  post-gastritis    ENDOSCOPY N/A 07/07/2023    Procedure: ESOPHAGOGASTRODUODENOSCOPY with varices banding;  Surgeon: Erik Magaña MD;  Location: [facility] ENDOSCOPY;  Service: Gastroenterology;  Laterality: N/A;  pre varices w/o bleeding  post duodenitis varice banding 7 bands deployed    ENDOSCOPY N/A 10/09/2023    Procedure: ESOPHAGOGASTRODUODENOSCOPY WITH VARICIES BANDING X 5;  Surgeon: Erik Magaña MD;  Location: [facility] ENDOSCOPY;  Service: Gastroenterology;  Laterality: N/A;  PRE: HISTORY OF VARICIES /   POST: VARICIES    ENDOSCOPY N/A 07/31/2024    Procedure:  ESOPHAGOGASTRODUODENOSCOPY with cold biopsies;  Surgeon: Erik Magaña MD;  Location: [facility] ENDOSCOPY;  Service: Gastroenterology;  Laterality: N/A;  pre screen  hx esophageal varices  post gastritis duodenitis    ENDOSCOPY W/ BANDING  2020    varices    ENDOSCOPY W/ BANDING      HERNIA REPAIR      PARACENTESIS      900 cc fluid drained    UPPER GASTROINTESTINAL ENDOSCOPY         Scheduled Meds:     Continuous Infusions: No current facility-administered medications for this visit.      PRN Meds:     No Known Allergies    Social History     Socioeconomic History    Marital status:    Tobacco Use    Smoking status: Former     Current packs/day: 0.50     Average packs/day: 0.5 packs/day for 15.0 years (7.5 ttl pk-yrs)     Types: Cigarettes    Smokeless tobacco: Never    Tobacco comments:     quit 6 months ago   Vaping Use    Vaping status: Never Used   Substance and Sexual Activity    Alcohol use: Not Currently     Comment: H/O alcoholism (quit 08/2020)    Drug use: Yes     Frequency: 1.0 times per week     Types: Marijuana    Sexual activity: Yes     Partners: Female       Family History   Problem Relation Age of Onset    Ovarian cancer Mother     Throat cancer Father     Malig Hyperthermia Neg Hx        Review of Systems   Constitutional:  Positive for appetite change.   Gastrointestinal:  Positive for abdominal pain.       Vitals:    03/14/25 1243   BP: 117/80   Pulse: 69   Temp: 98.2 °F (36.8 °C)       Physical Exam  Constitutional:       Appearance: He is well-developed.   Abdominal:      General: Bowel sounds are normal. There is no distension.      Palpations: Abdomen is soft. There is no mass.      Tenderness: There is no abdominal tenderness. There is no guarding.      Hernia: No hernia is present.   Skin:     General: Skin is warm and dry.      Capillary Refill: Capillary refill takes less than 2 seconds.   Neurological:      Mental Status: He is alert and oriented to person, place, and time.    Psychiatric:         Behavior: Behavior normal.     Assessment    Diagnoses and all orders for this visit:    1. Cirrhosis of liver without ascites, unspecified hepatic cirrhosis type (Primary)    2. Upper abdominal pain    3. Secondary esophageal varices without bleeding    4. Gallstones    5. Personal history of colon polyps, unspecified    Plan    Very pleasant 45-year-old male seen today in follow-up with a history of cirrhosis reporting some intermittent upper abdominal pain with findings of gallstones on ultrasound.  Physical examination unremarkable.  At this point recommend updated labs today for reassessment of the liver function tests.  Consider referral to Dr. Moore for evaluation of gallstones pending serology.  Continue beta-blocker, diuretic regimen and PPI therapy in the interim.         LAN Berry  [facility]  [address] [place] [address]  Office: [phone]    I spent 30 minutes caring for El on this date of service. This time includes time spent by me in the following activities: preparing for the visit, reviewing tests, obtaining and/or reviewing a separately obtained history, performing a medically appropriate examination and/or evaluation , counseling and educating the patient/family/caregiver, ordering medications, tests, or procedures, documenting information in the medical record, independently interpreting results and communicating that information with the patient/family/caregiver and care coordination.

## 2025-03-15 LAB
AFP-TM SERPL-MCNC: 1.9 NG/ML (ref 0–6.9)
ALBUMIN SERPL-MCNC: 4 G/DL (ref 3.5–5.2)
ALBUMIN/GLOB SERPL: 1.7 G/DL
ALP SERPL-CCNC: 54 U/L (ref 39–117)
ALT SERPL-CCNC: 27 U/L (ref 1–41)
AST SERPL-CCNC: 35 U/L (ref 1–40)
BILIRUB SERPL-MCNC: 1.7 MG/DL (ref 0–1.2)
BUN SERPL-MCNC: 8 MG/DL (ref 6–20)
BUN/CREAT SERPL: 11.3 (ref 7–25)
CALCIUM SERPL-MCNC: 9.1 MG/DL (ref 8.6–10.5)
CHLORIDE SERPL-SCNC: 105 MMOL/L (ref 98–107)
CO2 SERPL-SCNC: 25.4 MMOL/L (ref 22–29)
CREAT SERPL-MCNC: 0.71 MG/DL (ref 0.76–1.27)
EGFRCR SERPLBLD CKD-EPI 2021: 113.9 ML/MIN/1.73
ERYTHROCYTE [DISTWIDTH] IN BLOOD BY AUTOMATED COUNT: 14 % (ref 12.3–15.4)
GLOBULIN SER CALC-MCNC: 2.3 GM/DL
GLUCOSE SERPL-MCNC: 96 MG/DL (ref 65–99)
HCT VFR BLD AUTO: 41 % (ref 37.5–51)
HGB BLD-MCNC: 13.7 G/DL (ref 13–17.7)
INR PPP: 1.42 (ref 0.9–1.1)
MCH RBC QN AUTO: 30.2 PG (ref 26.6–33)
MCHC RBC AUTO-ENTMCNC: 33.4 G/DL (ref 31.5–35.7)
MCV RBC AUTO: 90.5 FL (ref 79–97)
PLATELET # BLD AUTO: 58 10*3/MM3 (ref 140–450)
POTASSIUM SERPL-SCNC: 3.9 MMOL/L (ref 3.5–5.2)
PROT SERPL-MCNC: 6.3 G/DL (ref 6–8.5)
PROTHROMBIN TIME: 17.3 SECONDS (ref 11.7–14.2)
RBC # BLD AUTO: 4.53 10*6/MM3 (ref 4.14–5.8)
SODIUM SERPL-SCNC: 141 MMOL/L (ref 136–145)
WBC # BLD AUTO: 5.38 10*3/MM3 (ref 3.4–10.8)

## 2025-03-25 DIAGNOSIS — R10.10 UPPER ABDOMINAL PAIN: Primary | ICD-10-CM

## 2025-03-25 DIAGNOSIS — K80.20 GALLSTONES: ICD-10-CM

## 2025-04-03 ENCOUNTER — TELEPHONE (OUTPATIENT)
Dept: GASTROENTEROLOGY | Facility: CLINIC | Age: 45
End: 2025-04-03
Payer: MEDICARE

## 2025-04-03 NOTE — TELEPHONE ENCOUNTER
Called patient and LVM letting him now his appt on 4/15/25 was canceled, he was just seen w/ Lakisha Garcia on 3/14/25 and she said he needs to see Dr. Salomon Moore before he schedules to see her again. Told pt to call office w/ any questions.     Ok for hub to relay.

## 2025-04-14 ENCOUNTER — TRANSCRIBE ORDERS (OUTPATIENT)
Dept: ADMINISTRATIVE | Facility: HOSPITAL | Age: 45
End: 2025-04-14
Payer: MEDICARE

## 2025-04-14 DIAGNOSIS — R63.4 ABNORMAL WEIGHT LOSS: Primary | ICD-10-CM

## 2025-04-15 ENCOUNTER — OFFICE VISIT (OUTPATIENT)
Dept: SURGERY | Facility: CLINIC | Age: 45
End: 2025-04-15
Payer: MEDICARE

## 2025-04-15 VITALS — OXYGEN SATURATION: 95 % | BODY MASS INDEX: 20.03 KG/M2 | HEIGHT: 69 IN | HEART RATE: 64 BPM | WEIGHT: 135.2 LBS

## 2025-04-15 DIAGNOSIS — K80.20 CALCULUS OF GALLBLADDER WITHOUT CHOLECYSTITIS WITHOUT OBSTRUCTION: Primary | ICD-10-CM

## 2025-04-15 PROCEDURE — 1160F RVW MEDS BY RX/DR IN RCRD: CPT | Performed by: SURGERY

## 2025-04-15 PROCEDURE — 1159F MED LIST DOCD IN RCRD: CPT | Performed by: SURGERY

## 2025-04-15 PROCEDURE — 99204 OFFICE O/P NEW MOD 45 MIN: CPT | Performed by: SURGERY

## 2025-04-15 RX ORDER — METHYLPREDNISOLONE 4 MG/1
TABLET ORAL
COMMUNITY
Start: 2025-04-14

## 2025-04-15 NOTE — PROGRESS NOTES
"Subjective   El Jiang is a 45 y.o. male presents to the office in surgical consultation from  Sarah Crow MD and LAN Berry for gallstones.    History of Present Illness     The patient has a known history of cirrhosis related to alcohol intake.  He has complications including esophageal varices and has required banding of the varices in the past.  He has also required paracentesis in the past.  He has not drank alcohol for the past 5 years but he does eat a lot of sugar and drinks soft drinks on a frequent basis with high fructose corn syrup.    He has a vague intermittent epigastric abdominal pain that is not well localized.  It seems to occur randomly and is not necessarily related to eating.  It is also not related to bowel function.  He is able to eat a regular diet with no postprandial symptoms.  He was evaluated with a right upper quadrant ultrasound that showed cirrhosis as well as splenomegaly but no focal liver lesion.  Also showed gallstones and a thickened gallbladder wall.    Review of Systems   Constitutional:  Negative for chills and fever.   Gastrointestinal:  Positive for abdominal pain. Negative for abdominal distention, constipation, diarrhea, nausea and vomiting.     Past Medical History:   Diagnosis Date    Acquired stuttering     WIFE STATES HE CAN BE HARD TO UNDERSTAND.. \"[place] ACCENT AND STUTTERS\"    Alcoholism     Bleeding esophageal varices     Cholelithiasis     Cirrhosis     end-stage    H/O PAF (paroxysmal atrial fibrillation) (CMS/HCC)     Hepatosplenomegaly     secondary to cirrhosis    Hernia     History of alcohol abuse     History of anemia     History of ascites     History of sepsis     Hyperlipidemia      Past Surgical History:   Procedure Laterality Date    COLONOSCOPY N/A 07/31/2024    Procedure: COLONOSCOPY to cecum ti with hot snare polypectomies/biopsyies;  Surgeon: Erik Magaña MD;  Location: [facility] ENDOSCOPY;  Service: Gastroenterology;  " Laterality: N/A;  pre  post polyps    ENDOSCOPY N/A 11/18/2020    Procedure: ESOPHAGOGASTRODUODENOSCOPY with esophageal banding x3;  Surgeon: Erik Magaña MD;  Location: [facility] ENDOSCOPY;  Service: Gastroenterology;  Laterality: N/A;  pre - cirrhosis of the liver and varices  post - gastritis, portal hypertensive gastropathy    ENDOSCOPY N/A 03/23/2022    Procedure: ESOPHAGOGASTRODUODENOSCOPY WITH COLD BIOPSIES AND BANDING x 5;  Surgeon: Erik Magaña MD;  Location: [facility] ENDOSCOPY;  Service: Gastroenterology;  Laterality: N/A;  HISTORY OF VARICES  VARICES, EROSIVE GASTRITIS    ENDOSCOPY N/A 06/01/2022    Procedure: ESOPHAGOGASTRODUODENOSCOPY;  Surgeon: Erik Magaña MD;  Location: [facility] ENDOSCOPY;  Service: Gastroenterology;  Laterality: N/A;  pre-hx varices  post-gastritis    ENDOSCOPY N/A 07/07/2023    Procedure: ESOPHAGOGASTRODUODENOSCOPY with varices banding;  Surgeon: Erik Magaña MD;  Location: [facility] ENDOSCOPY;  Service: Gastroenterology;  Laterality: N/A;  pre varices w/o bleeding  post duodenitis varice banding 7 bands deployed    ENDOSCOPY N/A 10/09/2023    Procedure: ESOPHAGOGASTRODUODENOSCOPY WITH VARICIES BANDING X 5;  Surgeon: Erik Magaña MD;  Location: [facility] ENDOSCOPY;  Service: Gastroenterology;  Laterality: N/A;  PRE: HISTORY OF VARICIES /   POST: VARICIES    ENDOSCOPY N/A 07/31/2024    Procedure: ESOPHAGOGASTRODUODENOSCOPY with cold biopsies;  Surgeon: Erik Magaña MD;  Location: [facility] ENDOSCOPY;  Service: Gastroenterology;  Laterality: N/A;  pre screen  hx esophageal varices  post gastritis duodenitis    ENDOSCOPY W/ BANDING  2020    varices    ENDOSCOPY W/ BANDING      HERNIA REPAIR      PARACENTESIS      900 cc fluid drained    UPPER GASTROINTESTINAL ENDOSCOPY       Family History   Problem Relation Age of Onset    Ovarian cancer Mother     Throat cancer Father     Malig Hyperthermia Neg Hx      Social History     Socioeconomic History     Marital status:    Tobacco Use    Smoking status: Every Day     Current packs/day: 0.50     Average packs/day: 0.5 packs/day for 15.0 years (7.5 ttl pk-yrs)     Types: Cigarettes    Smokeless tobacco: Never    Tobacco comments:     quit 6 months ago   Vaping Use    Vaping status: Never Used   Substance and Sexual Activity    Alcohol use: Not Currently     Comment: H/O alcoholism (quit 08/2020)    Drug use: Yes     Frequency: 1.0 times per week     Types: Marijuana    Sexual activity: Yes     Partners: Female       Objective   Physical Exam  Constitutional:       Appearance: He is not ill-appearing or toxic-appearing.   Abdominal:      Palpations: Abdomen is soft.      Tenderness: There is no abdominal tenderness.   Neurological:      Mental Status: He is alert.   Psychiatric:         Behavior: Behavior is cooperative.       BMI is within normal parameters. No other follow-up for BMI required.      Assessment & Plan     1.  Cholelithiasis: The patient has cholelithiasis but no symptoms to suggest gallbladder disease.  He does not have any postprandial issues nor food intolerances.  He has a thickened gallbladder wall but no symptoms to suggest cholecystitis.  The thickened wall is likely related to poor venous outflow from the gallbladder due to the cirrhosis.  There is no indication at this time for a cholecystectomy.  This was discussed with the patient and his wife.  He will return to the office if symptoms occur.  Typical gallbladder symptoms were discussed with the patient and his wife.    2.  Cirrhosis: The patient has end-stage cirrhosis that is complicated by esophageal varices, splenomegaly, and ascites that required paracentesis in the past.  He no longer drinks but does consume a lot of sugar and high fructose corn syrup.  He was advised to avoid these agents as they will also impact his liver.

## 2025-04-15 NOTE — LETTER
"April 15, 2025     Sarah Crow MD     Patient: El Jiang   YOB: 1980   Date of Visit: 4/15/2025     Dear Sarah Crow MD:       Thank you for referring El Jiang to me for evaluation. Below are the relevant portions of my assessment and plan of care.    If you have questions, please do not hesitate to call me. I look forward to following El along with you.         Sincerely,        Salomon Moore Jr., MD        CC: No Recipients    Salomon Moore Jr., MD  04/15/25 1312  Sign when Signing Visit  Subjective  El Jiang is a 45 y.o. male presents to the office in surgical consultation from  Sarah Crow MD and LAN Berry for gallstones.    History of Present Illness     The patient has a known history of cirrhosis related to alcohol intake.  He has complications including esophageal varices and has required banding of the varices in the past.  He has also required paracentesis in the past.  He has not drank alcohol for the past 5 years but he does eat a lot of sugar and drinks soft drinks on a frequent basis with high fructose corn syrup.    He has a vague intermittent epigastric abdominal pain that is not well localized.  It seems to occur randomly and is not necessarily related to eating.  It is also not related to bowel function.  He is able to eat a regular diet with no postprandial symptoms.  He was evaluated with a right upper quadrant ultrasound that showed cirrhosis as well as splenomegaly but no focal liver lesion.  Also showed gallstones and a thickened gallbladder wall.    Review of Systems   Constitutional:  Negative for chills and fever.   Gastrointestinal:  Positive for abdominal pain. Negative for abdominal distention, constipation, diarrhea, nausea and vomiting.     Past Medical History:   Diagnosis Date   • Acquired stuttering     WIFE STATES HE CAN BE HARD TO UNDERSTAND.. \"[place] ACCENT AND STUTTERS\"   • Alcoholism    • Bleeding esophageal varices "    • Cholelithiasis    • Cirrhosis     end-stage   • H/O PAF (paroxysmal atrial fibrillation) (CMS/HCC)    • Hepatosplenomegaly     secondary to cirrhosis   • Hernia    • History of alcohol abuse    • History of anemia    • History of ascites    • History of sepsis    • Hyperlipidemia      Past Surgical History:   Procedure Laterality Date   • COLONOSCOPY N/A 07/31/2024    Procedure: COLONOSCOPY to cecum ti with hot snare polypectomies/biopsyies;  Surgeon: Erik Magaña MD;  Location: [facility] ENDOSCOPY;  Service: Gastroenterology;  Laterality: N/A;  pre  post polyps   • ENDOSCOPY N/A 11/18/2020    Procedure: ESOPHAGOGASTRODUODENOSCOPY with esophageal banding x3;  Surgeon: Erik Magaña MD;  Location: [facility] ENDOSCOPY;  Service: Gastroenterology;  Laterality: N/A;  pre - cirrhosis of the liver and varices  post - gastritis, portal hypertensive gastropathy   • ENDOSCOPY N/A 03/23/2022    Procedure: ESOPHAGOGASTRODUODENOSCOPY WITH COLD BIOPSIES AND BANDING x 5;  Surgeon: Erik Magaña MD;  Location: [facility] ENDOSCOPY;  Service: Gastroenterology;  Laterality: N/A;  HISTORY OF VARICES  VARICES, EROSIVE GASTRITIS   • ENDOSCOPY N/A 06/01/2022    Procedure: ESOPHAGOGASTRODUODENOSCOPY;  Surgeon: Erik Magaña MD;  Location: [facility] ENDOSCOPY;  Service: Gastroenterology;  Laterality: N/A;  pre-hx varices  post-gastritis   • ENDOSCOPY N/A 07/07/2023    Procedure: ESOPHAGOGASTRODUODENOSCOPY with varices banding;  Surgeon: Erik Magaña MD;  Location: [facility] ENDOSCOPY;  Service: Gastroenterology;  Laterality: N/A;  pre varices w/o bleeding  post duodenitis varice banding 7 bands deployed   • ENDOSCOPY N/A 10/09/2023    Procedure: ESOPHAGOGASTRODUODENOSCOPY WITH VARICIES BANDING X 5;  Surgeon: Erik Magaña MD;  Location: [facility] ENDOSCOPY;  Service: Gastroenterology;  Laterality: N/A;  PRE: HISTORY OF VARICIES /   POST: VARICIES   • ENDOSCOPY N/A 07/31/2024    Procedure:  ESOPHAGOGASTRODUODENOSCOPY with cold biopsies;  Surgeon: Erik Magaña MD;  Location: [facility] ENDOSCOPY;  Service: Gastroenterology;  Laterality: N/A;  pre screen  hx esophageal varices  post gastritis duodenitis   • ENDOSCOPY W/ BANDING  2020    varices   • ENDOSCOPY W/ BANDING     • HERNIA REPAIR     • PARACENTESIS      900 cc fluid drained   • UPPER GASTROINTESTINAL ENDOSCOPY       Family History   Problem Relation Age of Onset   • Ovarian cancer Mother    • Throat cancer Father    • Malig Hyperthermia Neg Hx      Social History     Socioeconomic History   • Marital status:    Tobacco Use   • Smoking status: Every Day     Current packs/day: 0.50     Average packs/day: 0.5 packs/day for 15.0 years (7.5 ttl pk-yrs)     Types: Cigarettes   • Smokeless tobacco: Never   • Tobacco comments:     quit 6 months ago   Vaping Use   • Vaping status: Never Used   Substance and Sexual Activity   • Alcohol use: Not Currently     Comment: H/O alcoholism (quit 08/2020)   • Drug use: Yes     Frequency: 1.0 times per week     Types: Marijuana   • Sexual activity: Yes     Partners: Female       Objective  Physical Exam  Constitutional:       Appearance: He is not ill-appearing or toxic-appearing.   Abdominal:      Palpations: Abdomen is soft.      Tenderness: There is no abdominal tenderness.   Neurological:      Mental Status: He is alert.   Psychiatric:         Behavior: Behavior is cooperative.       BMI is within normal parameters. No other follow-up for BMI required.      Assessment & Plan    1.  Cholelithiasis: The patient has cholelithiasis but no symptoms to suggest gallbladder disease.  He does not have any postprandial issues nor food intolerances.  He has a thickened gallbladder wall but no symptoms to suggest cholecystitis.  The thickened wall is likely related to poor venous outflow from the gallbladder due to the cirrhosis.  There is no indication at this time for a cholecystectomy.  This was discussed  with the patient and his wife.  He will return to the office if symptoms occur.  Typical gallbladder symptoms were discussed with the patient and his wife.    2.  Cirrhosis: The patient has end-stage cirrhosis that is complicated by esophageal varices, splenomegaly, and ascites that required paracentesis in the past.  He no longer drinks but does consume a lot of sugar and high fructose corn syrup.  He was advised to avoid these agents as they will also impact his liver.

## 2025-04-18 ENCOUNTER — APPOINTMENT (OUTPATIENT)
Dept: CT IMAGING | Facility: HOSPITAL | Age: 45
End: 2025-04-18
Payer: MEDICARE

## 2025-04-18 ENCOUNTER — HOSPITAL ENCOUNTER (EMERGENCY)
Facility: HOSPITAL | Age: 45
Discharge: HOME | End: 2025-04-18
Attending: EMERGENCY MEDICINE
Payer: MEDICARE

## 2025-04-18 VITALS
HEIGHT: 69 IN | OXYGEN SATURATION: 94 % | DIASTOLIC BLOOD PRESSURE: 80 MMHG | RESPIRATION RATE: 20 BRPM | BODY MASS INDEX: 20.02 KG/M2 | WEIGHT: 135.14 LBS | TEMPERATURE: 98.3 F | SYSTOLIC BLOOD PRESSURE: 114 MMHG | HEART RATE: 51 BPM

## 2025-04-18 DIAGNOSIS — R10.10 UPPER ABDOMINAL PAIN: Primary | ICD-10-CM

## 2025-04-18 LAB
ALBUMIN SERPL-MCNC: 4 G/DL (ref 3.5–5.2)
ALBUMIN/GLOB SERPL: 1.4 G/DL
ALP SERPL-CCNC: 58 U/L (ref 39–117)
ALT SERPL W P-5'-P-CCNC: 39 U/L (ref 1–41)
ANION GAP SERPL CALCULATED.3IONS-SCNC: 8.8 MMOL/L (ref 5–15)
AST SERPL-CCNC: 30 U/L (ref 1–40)
BASOPHILS # BLD AUTO: 0.06 10*3/MM3 (ref 0–0.2)
BASOPHILS NFR BLD AUTO: 0.7 % (ref 0–1.5)
BILIRUB SERPL-MCNC: 0.9 MG/DL (ref 0–1.2)
BILIRUB UR QL STRIP: NEGATIVE
BUN SERPL-MCNC: 15 MG/DL (ref 6–20)
BUN/CREAT SERPL: 24.6 (ref 7–25)
CALCIUM SPEC-SCNC: 8.9 MG/DL (ref 8.6–10.5)
CHLORIDE SERPL-SCNC: 107 MMOL/L (ref 98–107)
CLARITY UR: CLEAR
CO2 SERPL-SCNC: 27.2 MMOL/L (ref 22–29)
COLOR UR: YELLOW
CREAT SERPL-MCNC: 0.61 MG/DL (ref 0.76–1.27)
DEPRECATED RDW RBC AUTO: 44.1 FL (ref 37–54)
EGFRCR SERPLBLD CKD-EPI 2021: 119.2 ML/MIN/1.73
EOSINOPHIL # BLD AUTO: 0.37 10*3/MM3 (ref 0–0.4)
EOSINOPHIL NFR BLD AUTO: 4.5 % (ref 0.3–6.2)
ERYTHROCYTE [DISTWIDTH] IN BLOOD BY AUTOMATED COUNT: 13.8 % (ref 12.3–15.4)
GLOBULIN UR ELPH-MCNC: 2.8 GM/DL
GLUCOSE SERPL-MCNC: 107 MG/DL (ref 65–99)
GLUCOSE UR STRIP-MCNC: NEGATIVE MG/DL
HCT VFR BLD AUTO: 44.2 % (ref 37.5–51)
HGB BLD-MCNC: 14.9 G/DL (ref 13–17.7)
HGB UR QL STRIP.AUTO: NEGATIVE
HOLD SPECIMEN: NORMAL
HOLD SPECIMEN: NORMAL
IMM GRANULOCYTES # BLD AUTO: 0.05 10*3/MM3 (ref 0–0.05)
IMM GRANULOCYTES NFR BLD AUTO: 0.6 % (ref 0–0.5)
KETONES UR QL STRIP: NEGATIVE
LEUKOCYTE ESTERASE UR QL STRIP.AUTO: NEGATIVE
LIPASE SERPL-CCNC: 127 U/L (ref 13–60)
LYMPHOCYTES # BLD AUTO: 0.98 10*3/MM3 (ref 0.7–3.1)
LYMPHOCYTES NFR BLD AUTO: 11.9 % (ref 19.6–45.3)
MCH RBC QN AUTO: 29.9 PG (ref 26.6–33)
MCHC RBC AUTO-ENTMCNC: 33.7 G/DL (ref 31.5–35.7)
MCV RBC AUTO: 88.8 FL (ref 79–97)
MONOCYTES # BLD AUTO: 0.69 10*3/MM3 (ref 0.1–0.9)
MONOCYTES NFR BLD AUTO: 8.4 % (ref 5–12)
NEUTROPHILS NFR BLD AUTO: 6.06 10*3/MM3 (ref 1.7–7)
NEUTROPHILS NFR BLD AUTO: 73.9 % (ref 42.7–76)
NITRITE UR QL STRIP: NEGATIVE
PH UR STRIP.AUTO: 5.5 [PH] (ref 5–8)
PLATELET # BLD AUTO: 75 10*3/MM3 (ref 140–450)
PMV BLD AUTO: 10.7 FL (ref 6–12)
POTASSIUM SERPL-SCNC: 3.9 MMOL/L (ref 3.5–5.2)
PROT SERPL-MCNC: 6.8 G/DL (ref 6–8.5)
PROT UR QL STRIP: NEGATIVE
RBC # BLD AUTO: 4.98 10*6/MM3 (ref 4.14–5.8)
SODIUM SERPL-SCNC: 143 MMOL/L (ref 136–145)
SP GR UR STRIP: 1.01 (ref 1–1.03)
UROBILINOGEN UR QL STRIP: NORMAL
WBC NRBC COR # BLD AUTO: 8.21 10*3/MM3 (ref 3.4–10.8)
WHOLE BLOOD HOLD COAG: NORMAL
WHOLE BLOOD HOLD SPECIMEN: NORMAL

## 2025-04-18 PROCEDURE — 96375 TX/PRO/DX INJ NEW DRUG ADDON: CPT

## 2025-04-18 PROCEDURE — 25810000003 SODIUM CHLORIDE 0.9 % SOLUTION: Performed by: PHYSICIAN ASSISTANT

## 2025-04-18 PROCEDURE — 74177 CT ABD & PELVIS W/CONTRAST: CPT

## 2025-04-18 PROCEDURE — 25010000002 ONDANSETRON PER 1 MG: Performed by: EMERGENCY MEDICINE

## 2025-04-18 PROCEDURE — 85025 COMPLETE CBC W/AUTO DIFF WBC: CPT

## 2025-04-18 PROCEDURE — 25010000002 MORPHINE PER 10 MG: Performed by: EMERGENCY MEDICINE

## 2025-04-18 PROCEDURE — 80053 COMPREHEN METABOLIC PANEL: CPT

## 2025-04-18 PROCEDURE — 25510000001 IOPAMIDOL 61 % SOLUTION: Performed by: EMERGENCY MEDICINE

## 2025-04-18 PROCEDURE — 99285 EMERGENCY DEPT VISIT HI MDM: CPT

## 2025-04-18 PROCEDURE — 81003 URINALYSIS AUTO W/O SCOPE: CPT | Performed by: EMERGENCY MEDICINE

## 2025-04-18 PROCEDURE — 83690 ASSAY OF LIPASE: CPT

## 2025-04-18 PROCEDURE — 36415 COLL VENOUS BLD VENIPUNCTURE: CPT

## 2025-04-18 PROCEDURE — 96374 THER/PROPH/DIAG INJ IV PUSH: CPT

## 2025-04-18 RX ORDER — SODIUM CHLORIDE 0.9 % (FLUSH) 0.9 %
10 SYRINGE (ML) INJECTION AS NEEDED
Status: DISCONTINUED | OUTPATIENT
Start: 2025-04-18 | End: 2025-04-19 | Stop reason: HOSPADM

## 2025-04-18 RX ORDER — MORPHINE SULFATE 2 MG/ML
4 INJECTION, SOLUTION INTRAMUSCULAR; INTRAVENOUS ONCE
Status: COMPLETED | OUTPATIENT
Start: 2025-04-18 | End: 2025-04-18

## 2025-04-18 RX ORDER — ONDANSETRON 4 MG/1
4 TABLET, ORALLY DISINTEGRATING ORAL EVERY 8 HOURS PRN
Qty: 12 TABLET | Refills: 0 | Status: SHIPPED | OUTPATIENT
Start: 2025-04-18 | End: 2025-04-25 | Stop reason: SDUPTHER

## 2025-04-18 RX ORDER — IOPAMIDOL 612 MG/ML
100 INJECTION, SOLUTION INTRAVASCULAR
Status: COMPLETED | OUTPATIENT
Start: 2025-04-18 | End: 2025-04-18

## 2025-04-18 RX ORDER — HYDROCODONE BITARTRATE AND ACETAMINOPHEN 5; 325 MG/1; MG/1
1 TABLET ORAL EVERY 6 HOURS PRN
Qty: 12 TABLET | Refills: 0 | Status: SHIPPED | OUTPATIENT
Start: 2025-04-18

## 2025-04-18 RX ORDER — ONDANSETRON 2 MG/ML
4 INJECTION INTRAMUSCULAR; INTRAVENOUS ONCE
Status: COMPLETED | OUTPATIENT
Start: 2025-04-18 | End: 2025-04-18

## 2025-04-18 RX ADMIN — ONDANSETRON 4 MG: 2 INJECTION, SOLUTION INTRAMUSCULAR; INTRAVENOUS at 21:42

## 2025-04-18 RX ADMIN — MORPHINE SULFATE 4 MG: 2 INJECTION, SOLUTION INTRAMUSCULAR; INTRAVENOUS at 21:42

## 2025-04-18 RX ADMIN — SODIUM CHLORIDE 1000 ML: 9 INJECTION, SOLUTION INTRAVENOUS at 20:48

## 2025-04-18 RX ADMIN — IOPAMIDOL 85 ML: 612 INJECTION, SOLUTION INTRAVENOUS at 19:27

## 2025-04-18 NOTE — ED PROVIDER NOTES
EMERGENCY DEPARTMENT ENCOUNTER      PCP: Sarah Crow MD  Patient Care Team:  Sarah Crow MD as PCP - General (Internal Medicine)  Adrian Ibrahim MD (General Practice)  Omar Mims PA-C as Referring Physician (Physician Assistant)  Saul Conley II, MD as Consulting Physician (Hematology and Oncology)   Independent Historians: Patient, spouse    HPI:  Chief Complaint: Abdominal pain, nausea   A complete HPI/ROS/PMH/PSH/SH/FH are unobtainable due to: None    Chronic or social conditions impacting patient care (social determinants of health): None    Context: El Jiang is a 45 y.o. male with history of cirrhosis, esophageal varices, paroxysmal atrial fibrillation who presents to the ED c/o acute abdominal pain, nausea and decreased appetite.  Patient has been feeling poorly for the past 3 to 4 days.  He denies any diarrhea.  Denies fever or chills.  Patient reports he is 5 years sober from alcohol denies any recent or current alcohol use.  Patient reports being seen by specialist for his gallbladder due to gallstone and is unsure if symptoms are related to this.  Previously patient was on transplant list for his cirrhosis however he states his liver has been healing since he stopped drinking alcohol and is no longer indicated for liver transplant.    Review of prior external notes and/or external test results outside of this encounter: Ultrasound of the liver on 3/10/2025 showed morphology consistent with cirrhosis.  Patient additionally has splenomegaly.  Cholelithiasis also noted with some wall thickening and possible concern for cholecystitis.  This visit with Dr. Moore, general surgery, on 4/15/2025.  Dr. Moore note states thickened gallbladder wall likely related to poor venous outflow from gallbladder due to cirrhosis.  No indication for cholecystectomy at this time.      PAST MEDICAL HISTORY  Active Ambulatory Problems     Diagnosis Date Noted    Thrombocytopenia 10/09/2020    Anemia  10/09/2020    PAF (paroxysmal atrial fibrillation)     Cirrhosis     Bleeding esophageal varices     History of alcohol abuse     Tobacco dependence     Alcoholic cirrhosis of liver with ascites 10/27/2020    Secondary esophageal varices without bleeding 10/27/2020    Abdominal pain 02/12/2023    Esophageal varices without bleeding 03/01/2024    Screening for colon cancer 03/01/2024     Resolved Ambulatory Problems     Diagnosis Date Noted    No Resolved Ambulatory Problems     Past Medical History:   Diagnosis Date    Acquired stuttering     Alcoholism     Cholelithiasis     Hepatosplenomegaly     Hernia     History of anemia     History of ascites     History of sepsis     Hyperlipidemia        The patient has started, but not completed, their COVID-19 vaccination series.    PAST SURGICAL HISTORY  Past Surgical History:   Procedure Laterality Date    COLONOSCOPY N/A 07/31/2024    Procedure: COLONOSCOPY to cecum ti with hot snare polypectomies/biopsyies;  Surgeon: Erik Magaña MD;  Location: [facility] ENDOSCOPY;  Service: Gastroenterology;  Laterality: N/A;  pre  post polyps    ENDOSCOPY N/A 11/18/2020    Procedure: ESOPHAGOGASTRODUODENOSCOPY with esophageal banding x3;  Surgeon: Erik Magaña MD;  Location: [facility] ENDOSCOPY;  Service: Gastroenterology;  Laterality: N/A;  pre - cirrhosis of the liver and varices  post - gastritis, portal hypertensive gastropathy    ENDOSCOPY N/A 03/23/2022    Procedure: ESOPHAGOGASTRODUODENOSCOPY WITH COLD BIOPSIES AND BANDING x 5;  Surgeon: Erik Magaña MD;  Location: [facility] ENDOSCOPY;  Service: Gastroenterology;  Laterality: N/A;  HISTORY OF VARICES  VARICES, EROSIVE GASTRITIS    ENDOSCOPY N/A 06/01/2022    Procedure: ESOPHAGOGASTRODUODENOSCOPY;  Surgeon: Erik Magaña MD;  Location: [facility] ENDOSCOPY;  Service: Gastroenterology;  Laterality: N/A;  pre-hx varices  post-gastritis    ENDOSCOPY N/A 07/07/2023    Procedure: ESOPHAGOGASTRODUODENOSCOPY with  varices banding;  Surgeon: Erik Magaña MD;  Location:  [facility] ENDOSCOPY;  Service: Gastroenterology;  Laterality: N/A;  pre varices w/o bleeding  post duodenitis varice banding 7 bands deployed    ENDOSCOPY N/A 10/09/2023    Procedure: ESOPHAGOGASTRODUODENOSCOPY WITH VARICIES BANDING X 5;  Surgeon: Erik Magaña MD;  Location:  [facility] ENDOSCOPY;  Service: Gastroenterology;  Laterality: N/A;  PRE: HISTORY OF VARICIES /   POST: VARICIES    ENDOSCOPY N/A 07/31/2024    Procedure: ESOPHAGOGASTRODUODENOSCOPY with cold biopsies;  Surgeon: Erik Magaña MD;  Location:  [facility] ENDOSCOPY;  Service: Gastroenterology;  Laterality: N/A;  pre screen  hx esophageal varices  post gastritis duodenitis    ENDOSCOPY W/ BANDING  2020    varices    ENDOSCOPY W/ BANDING      HERNIA REPAIR      PARACENTESIS      900 cc fluid drained    UPPER GASTROINTESTINAL ENDOSCOPY           FAMILY HISTORY  Family History   Problem Relation Age of Onset    Ovarian cancer Mother     Throat cancer Father     Malig Hyperthermia Neg Hx          SOCIAL HISTORY  Social History     Socioeconomic History    Marital status:    Tobacco Use    Smoking status: Every Day     Current packs/day: 0.50     Average packs/day: 0.5 packs/day for 15.0 years (7.5 ttl pk-yrs)     Types: Cigarettes    Smokeless tobacco: Never    Tobacco comments:     quit 6 months ago   Vaping Use    Vaping status: Never Used   Substance and Sexual Activity    Alcohol use: Not Currently     Comment: H/O alcoholism (quit 08/2020)    Drug use: Yes     Frequency: 1.0 times per week     Types: Marijuana    Sexual activity: Yes     Partners: Female         ALLERGIES  Patient has no known allergies.        REVIEW OF SYSTEMS  Review of Systems   Constitutional:  Negative for fever.   Gastrointestinal:  Positive for abdominal pain and nausea. Negative for blood in stool.        All systems reviewed and negative except for those discussed in HPI.       PHYSICAL EXAM    I  have reviewed the triage vital signs and nursing notes.    ED Triage Vitals [04/18/25 1611]   Temp Heart Rate Resp BP SpO2   98.3 °F (36.8 °C) 78 20 141/96 98 %      Temp src Heart Rate Source Patient Position BP Location FiO2 (%)   Tympanic -- -- -- --       Physical Exam  GENERAL: alert, no acute distress  SKIN: Warm, dry  HENT: Normocephalic, atraumatic  EYES: no scleral icterus  CV: regular rhythm, regular rate  RESPIRATORY: normal effort, lungs clear  ABDOMEN: soft, generalized upper abdominal discomfort on palpation, nondistended  MUSCULOSKELETAL: no deformity  NEURO: alert, moves all extremities, follows commands          LAB RESULTS  Recent Results (from the past 24 hours)   Comprehensive Metabolic Panel    Collection Time: 04/18/25  4:17 PM    Specimen: Arm, Right; Blood   Result Value Ref Range    Glucose 107 (H) 65 - 99 mg/dL    BUN 15 6 - 20 mg/dL    Creatinine 0.61 (L) 0.76 - 1.27 mg/dL    Sodium 143 136 - 145 mmol/L    Potassium 3.9 3.5 - 5.2 mmol/L    Chloride 107 98 - 107 mmol/L    CO2 27.2 22.0 - 29.0 mmol/L    Calcium 8.9 8.6 - 10.5 mg/dL    Total Protein 6.8 6.0 - 8.5 g/dL    Albumin 4.0 3.5 - 5.2 g/dL    ALT (SGPT) 39 1 - 41 U/L    AST (SGOT) 30 1 - 40 U/L    Alkaline Phosphatase 58 39 - 117 U/L    Total Bilirubin 0.9 0.0 - 1.2 mg/dL    Globulin 2.8 gm/dL    A/G Ratio 1.4 g/dL    BUN/Creatinine Ratio 24.6 7.0 - 25.0    Anion Gap 8.8 5.0 - 15.0 mmol/L    eGFR 119.2 >60.0 mL/min/1.73   Lipase    Collection Time: 04/18/25  4:17 PM    Specimen: Arm, Right; Blood   Result Value Ref Range    Lipase 127 (H) 13 - 60 U/L   Green Top (Gel)    Collection Time: 04/18/25  4:17 PM   Result Value Ref Range    Extra Tube Hold for add-ons.    Lavender Top    Collection Time: 04/18/25  4:17 PM   Result Value Ref Range    Extra Tube hold for add-on    Gold Top - SST    Collection Time: 04/18/25  4:17 PM   Result Value Ref Range    Extra Tube Hold for add-ons.    Light Blue Top    Collection Time: 04/18/25  4:17 PM    Result Value Ref Range    Extra Tube Hold for add-ons.    CBC Auto Differential    Collection Time: 04/18/25  4:17 PM    Specimen: Arm, Right; Blood   Result Value Ref Range    WBC 8.21 3.40 - 10.80 10*3/mm3    RBC 4.98 4.14 - 5.80 10*6/mm3    Hemoglobin 14.9 13.0 - 17.7 g/dL    Hematocrit 44.2 37.5 - 51.0 %    MCV 88.8 79.0 - 97.0 fL    MCH 29.9 26.6 - 33.0 pg    MCHC 33.7 31.5 - 35.7 g/dL    RDW 13.8 12.3 - 15.4 %    RDW-SD 44.1 37.0 - 54.0 fl    MPV 10.7 6.0 - 12.0 fL    Platelets 75 (L) 140 - 450 10*3/mm3    Neutrophil % 73.9 42.7 - 76.0 %    Lymphocyte % 11.9 (L) 19.6 - 45.3 %    Monocyte % 8.4 5.0 - 12.0 %    Eosinophil % 4.5 0.3 - 6.2 %    Basophil % 0.7 0.0 - 1.5 %    Immature Grans % 0.6 (H) 0.0 - 0.5 %    Neutrophils, Absolute 6.06 1.70 - 7.00 10*3/mm3    Lymphocytes, Absolute 0.98 0.70 - 3.10 10*3/mm3    Monocytes, Absolute 0.69 0.10 - 0.90 10*3/mm3    Eosinophils, Absolute 0.37 0.00 - 0.40 10*3/mm3    Basophils, Absolute 0.06 0.00 - 0.20 10*3/mm3    Immature Grans, Absolute 0.05 0.00 - 0.05 10*3/mm3   Urinalysis With Microscopic If Indicated (No Culture) - Urine, Clean Catch    Collection Time: 04/18/25  6:10 PM    Specimen: Urine, Clean Catch   Result Value Ref Range    Color, UA Yellow Yellow, Straw    Appearance, UA Clear Clear    pH, UA 5.5 5.0 - 8.0    Specific Gravity, UA 1.012 1.005 - 1.030    Glucose, UA Negative Negative    Ketones, UA Negative Negative    Bilirubin, UA Negative Negative    Blood, UA Negative Negative    Protein, UA Negative Negative    Leuk Esterase, UA Negative Negative    Nitrite, UA Negative Negative    Urobilinogen, UA 0.2 E.U./dL 0.2 - 1.0 E.U./dL       Ordered the above labs and independently reviewed and interpreted the results.        RADIOLOGY  CT Abdomen Pelvis With Contrast  Result Date: 4/18/2025  CT  ABDOMEN & PELVIS WITH IV CONTRAST  INDICATION: Abdominal pain. Nausea and weight loss.  TECHNIQUE: CT of the abdomen and pelvis with  IV contrast. Coronal and  sagittal reconstructions were obtained.  Radiation dose reduction techniques were utilized, including automated exposure control, and exposure modulation based on body size.  COMPARISON: CT abdomen pelvis 2/12/2023  FINDINGS:  Lung bases: Visualized lung bases are unremarkable.  Abdomen: Redemonstrated macronodular cirrhosis, without discrete highly suspicious mass on the single phase CT images. Redemonstrated gallbladder wall thickening and cholelithiasis. Redemonstrated splenomegaly to 18 cm. The SMV, splenic vein and portal vein appear patent. Probable spontaneous left splenorenal shunt.  Both adrenal glands are normal.  Both kidneys are normal.  The aorta is normal in caliber.  There is no abdominal or retroperitoneal adenopathy or other mass.  There is no abdominal or retroperitoneal inflammatory change, or abnormal fluid or air collection. There is mild mesenteric edema and retroperitoneal fascial stranding, but no evidence of leann ascites, or of loculated fluid collection is seen.  There is wall thickening in the right colon and in the proximal to mid transverse colon, but there is no evidence of bowel obstruction. The left and sigmoid colon and rectum are unremarkable.  Pelvis:  There is no pelvic inflammatory change or other abnormal fluid collection.  There is no pelvic adenopathy or other soft tissue mass. There is no CT evidence of hernia or bowel obstruction.  There is no acute bony abnormality.       Redemonstrated changes of cirrhosis and portal hypertension, no definite new or acute abnormality.  Redemonstrated cholelithiasis, and gallbladder wall thickening, though gallbladder wall thickening is a nonspecific finding in the setting of portal hypertension.      This report was finalized on 4/18/2025 8:20 PM by Dr. Max Betancourt M.D on Workstation: [id number]        I ordered the above noted radiological studies. Independently reviewed and interpreted by me.  See dictation for official radiology  interpretation.      PROCEDURES    Procedures      MEDICATIONS GIVEN IN ER    Medications   sodium chloride 0.9 % flush 10 mL (has no administration in time range)   iopamidol (ISOVUE-300) 61 % injection 100 mL (85 mL Intravenous Given 4/18/25 1927)   sodium chloride 0.9 % bolus 1,000 mL (0 mL Intravenous Stopped 4/18/25 2143)   morphine injection 4 mg (4 mg Intravenous Given 4/18/25 2142)   ondansetron (ZOFRAN) injection 4 mg (4 mg Intravenous Given 4/18/25 2142)         PROGRESS, DATA ANALYSIS, CONSULTS, AND MEDICAL DECISION MAKING    All labs have been independently reviewed and interpreted by me.  All radiology studies have been independently reviewed and interpreted by me and discussed with radiologist dictating the report.   EKG's independently reviewed and interpreted by me.  Discussion below represents my analysis of pertinent findings related to patient's condition, differential diagnosis, treatment plan and final disposition.    My differential diagnosis for abdominal pain includes but is not limited to:    Gastritis, gastroenteritis, peptic ulcer disease, GERD, esophageal perforation, acute appendicitis, mesenteric adenitis, Meckel’s diverticulum, epiploic appendagitis, diverticulitis, colon cancer, ulcerative colitis, Crohn’s disease, intussusception, small bowel obstruction, adhesions, ischemic bowel, perforated viscus, ileus, obstipation, biliary colic, cholecystitis, cholelithiasis, hepatitis, pancreatitis, common bile duct obstruction, cholangitis, bile leak, splenic trauma, splenic rupture, splenic infarction, splenic abscess, abdominal abscess, ascites, spontaneous bacterial peritonitis, hernia, UTI, cystitis, prostatitis, ureterolithiasis, urinary obstruction, pelvic abscess, AAA, myocardial infarction, pneumonia, cancer, porphyria, DKA, medications, sickle cell, viral syndrome, zoster      ED Course as of 04/19/25 0018   Fri Apr 18, 2025 1717 WBC: 8.21 [DC]   1717 Hemoglobin: 14.9 [DC]   1717  BUN: 15 [DC]   1717 Creatinine(!): 0.61 [DC]   1717 ALT (SGPT): 39 [DC]   1717 AST (SGOT): 30 [DC]   1717 Alkaline Phosphatase: 58 [DC]   1717 Total Bilirubin: 0.9 [DC]   1717 Lipase(!): 127 [DC]   2015 CT Abdomen Pelvis With Contrast  Radiology study independently interpreted by me and my findings are no free air.   [DC]   2113 Discussed lab and imaging results with patient. He is still experiencing pain in his epigastric region. Will give dose of pain and nausea medication. IVFs infusing.  [DC]      ED Course User Index  [DC] Awilda Carrion PA             AS OF 00:18 EDT VITALS:    BP - 114/80  HR - 51  TEMP - 98.3 °F (36.8 °C) (Tympanic)  O2 SATS - 94%        DIAGNOSIS  Final diagnoses:   Upper abdominal pain         DISPOSITION  ED Disposition       ED Disposition   Discharge    Condition   Stable    Comment   --                  Note Disclaimer: At [facility], we believe that sharing information builds trust and better relationships. You are receiving this note because you recently visited [facility]. It is possible you will see health information before a provider has talked with you about it. This kind of information can be easy to misunderstand. To help you fully understand what it means for your health, we urge you to discuss this note with your provider.         Awilda Carrion PA  04/19/25 0018

## 2025-04-19 NOTE — ED PROVIDER NOTES
MD ATTESTATION NOTE    SHARED VISIT: This visit was performed by BOTH a physician and an APC. The substantive portion of the medical decision making was performed by this attesting physician who made or approved the management plan and takes responsibility for patient management. All studies in the APC note (if performed) were independently interpreted by me.     The GENO and I have discussed this patient's history, physical exam, and treatment plan.  I have reviewed the documentation and affirm the documentation and agree with the treatment and plan.  The attached note describes my personal findings.      Independent Historians: Patient and family    A complete HPI/ROS/PMH/PSH/SH/FH are unobtainable due to: None    Chronic or social conditions impacting patient care (social determinants of health): None    El Jiang is a 45 y.o. male who presents to the ED c/o acute abdominal pain and nausea history of cirrhosis, esophageal varices, A-fib.  Patient feeling more poor than baseline for the past 3 to 4 days with no diarrhea or fevers.  Patient followed by hepatology/GI for gallbladder and gallstone that is known in setting of cirrhosis.          On exam:  GENERAL: Cooperative and conversant thin male, chronic ill appearance, alert, no acute distress  SKIN: Warm, dry  HENT: Normocephalic, atraumatic  RESPIRATORY: Relaxed breathing  ABDOMEN: soft, diffuse upper abdominal tenderness to palpation with no rebound and no guarding, nondistended  NEURO: alert, moves all extremities, follows commands                                                             Labs  Recent Results (from the past 24 hours)   Comprehensive Metabolic Panel    Collection Time: 04/18/25  4:17 PM    Specimen: Arm, Right; Blood   Result Value Ref Range    Glucose 107 (H) 65 - 99 mg/dL    BUN 15 6 - 20 mg/dL    Creatinine 0.61 (L) 0.76 - 1.27 mg/dL    Sodium 143 136 - 145 mmol/L    Potassium 3.9 3.5 - 5.2 mmol/L    Chloride 107 98 - 107 mmol/L     CO2 27.2 22.0 - 29.0 mmol/L    Calcium 8.9 8.6 - 10.5 mg/dL    Total Protein 6.8 6.0 - 8.5 g/dL    Albumin 4.0 3.5 - 5.2 g/dL    ALT (SGPT) 39 1 - 41 U/L    AST (SGOT) 30 1 - 40 U/L    Alkaline Phosphatase 58 39 - 117 U/L    Total Bilirubin 0.9 0.0 - 1.2 mg/dL    Globulin 2.8 gm/dL    A/G Ratio 1.4 g/dL    BUN/Creatinine Ratio 24.6 7.0 - 25.0    Anion Gap 8.8 5.0 - 15.0 mmol/L    eGFR 119.2 >60.0 mL/min/1.73   Lipase    Collection Time: 04/18/25  4:17 PM    Specimen: Arm, Right; Blood   Result Value Ref Range    Lipase 127 (H) 13 - 60 U/L   Green Top (Gel)    Collection Time: 04/18/25  4:17 PM   Result Value Ref Range    Extra Tube Hold for add-ons.    Lavender Top    Collection Time: 04/18/25  4:17 PM   Result Value Ref Range    Extra Tube hold for add-on    Gold Top - SST    Collection Time: 04/18/25  4:17 PM   Result Value Ref Range    Extra Tube Hold for add-ons.    Light Blue Top    Collection Time: 04/18/25  4:17 PM   Result Value Ref Range    Extra Tube Hold for add-ons.    CBC Auto Differential    Collection Time: 04/18/25  4:17 PM    Specimen: Arm, Right; Blood   Result Value Ref Range    WBC 8.21 3.40 - 10.80 10*3/mm3    RBC 4.98 4.14 - 5.80 10*6/mm3    Hemoglobin 14.9 13.0 - 17.7 g/dL    Hematocrit 44.2 37.5 - 51.0 %    MCV 88.8 79.0 - 97.0 fL    MCH 29.9 26.6 - 33.0 pg    MCHC 33.7 31.5 - 35.7 g/dL    RDW 13.8 12.3 - 15.4 %    RDW-SD 44.1 37.0 - 54.0 fl    MPV 10.7 6.0 - 12.0 fL    Platelets 75 (L) 140 - 450 10*3/mm3    Neutrophil % 73.9 42.7 - 76.0 %    Lymphocyte % 11.9 (L) 19.6 - 45.3 %    Monocyte % 8.4 5.0 - 12.0 %    Eosinophil % 4.5 0.3 - 6.2 %    Basophil % 0.7 0.0 - 1.5 %    Immature Grans % 0.6 (H) 0.0 - 0.5 %    Neutrophils, Absolute 6.06 1.70 - 7.00 10*3/mm3    Lymphocytes, Absolute 0.98 0.70 - 3.10 10*3/mm3    Monocytes, Absolute 0.69 0.10 - 0.90 10*3/mm3    Eosinophils, Absolute 0.37 0.00 - 0.40 10*3/mm3    Basophils, Absolute 0.06 0.00 - 0.20 10*3/mm3    Immature Grans, Absolute 0.05 0.00  - 0.05 10*3/mm3   Urinalysis With Microscopic If Indicated (No Culture) - Urine, Clean Catch    Collection Time: 04/18/25  6:10 PM    Specimen: Urine, Clean Catch   Result Value Ref Range    Color, UA Yellow Yellow, Straw    Appearance, UA Clear Clear    pH, UA 5.5 5.0 - 8.0    Specific Gravity, UA 1.012 1.005 - 1.030    Glucose, UA Negative Negative    Ketones, UA Negative Negative    Bilirubin, UA Negative Negative    Blood, UA Negative Negative    Protein, UA Negative Negative    Leuk Esterase, UA Negative Negative    Nitrite, UA Negative Negative    Urobilinogen, UA 0.2 E.U./dL 0.2 - 1.0 E.U./dL       Radiology  CT Abdomen Pelvis With Contrast  Result Date: 4/18/2025  CT  ABDOMEN & PELVIS WITH IV CONTRAST  INDICATION: Abdominal pain. Nausea and weight loss.  TECHNIQUE: CT of the abdomen and pelvis with  IV contrast. Coronal and sagittal reconstructions were obtained.  Radiation dose reduction techniques were utilized, including automated exposure control, and exposure modulation based on body size.  COMPARISON: CT abdomen pelvis 2/12/2023  FINDINGS:  Lung bases: Visualized lung bases are unremarkable.  Abdomen: Redemonstrated macronodular cirrhosis, without discrete highly suspicious mass on the single phase CT images. Redemonstrated gallbladder wall thickening and cholelithiasis. Redemonstrated splenomegaly to 18 cm. The SMV, splenic vein and portal vein appear patent. Probable spontaneous left splenorenal shunt.  Both adrenal glands are normal.  Both kidneys are normal.  The aorta is normal in caliber.  There is no abdominal or retroperitoneal adenopathy or other mass.  There is no abdominal or retroperitoneal inflammatory change, or abnormal fluid or air collection. There is mild mesenteric edema and retroperitoneal fascial stranding, but no evidence of leann ascites, or of loculated fluid collection is seen.  There is wall thickening in the right colon and in the proximal to mid transverse colon, but there  is no evidence of bowel obstruction. The left and sigmoid colon and rectum are unremarkable.  Pelvis:  There is no pelvic inflammatory change or other abnormal fluid collection.  There is no pelvic adenopathy or other soft tissue mass. There is no CT evidence of hernia or bowel obstruction.  There is no acute bony abnormality.       Redemonstrated changes of cirrhosis and portal hypertension, no definite new or acute abnormality.  Redemonstrated cholelithiasis, and gallbladder wall thickening, though gallbladder wall thickening is a nonspecific finding in the setting of portal hypertension.      This report was finalized on 4/18/2025 8:20 PM by Dr. Max Betancourt M.D on Workstation: [id number]        Medical Decision Making:  ED Course as of 04/20/25 1109   Fri Apr 18, 2025 1717 WBC: 8.21 [DC]   1717 Hemoglobin: 14.9 [DC]   1717 BUN: 15 [DC]   1717 Creatinine(!): 0.61 [DC]   1717 ALT (SGPT): 39 [DC]   1717 AST (SGOT): 30 [DC]   1717 Alkaline Phosphatase: 58 [DC]   1717 Total Bilirubin: 0.9 [DC]   1717 Lipase(!): 127 [DC]   2015 CT Abdomen Pelvis With Contrast  Radiology study independently interpreted by me and my findings are no free air.   [DC]   2113 Discussed lab and imaging results with patient. He is still experiencing pain in his epigastric region. Will give dose of pain and nausea medication. IVFs infusing.  [DC]      ED Course User Index  [DC] Awilda Carrion PA       MDM: The differential diagnosis for this patient includes: appendicitis, pancreatitis, cholecystitis/biliary colic, PUD, gastritis, pneumonia, ureteral stone, sbo, hernia, colitis, diverticulitis, AAA, malignancy, gastroenteritis, food intolerances. Abdominal exam is without peritoneal signs. There is no evidence of acute abdomen at this time. The patient is well appearing. I have a low suspicion for acute hepatobiliary disease (including acute cholecystitis), acute pancreatitis, PUD (including perforation), acute infectious processes  (pneumonia, hepatitis, pyelonephritis), acute appendicitis, vascular catastrophe, bowel obstruction or viscus perforation. Plan serum labs, urinalysis, pain control, IMAGING CT abdomen pelvis, and serial reassessment.    Procedures:  Procedures        PPE: I followed hospital protocols for proper PPE based on patient presentation including use of N95 mask for suspected infectious respiratory conditions.  Proper hand hygiene was performed both before and after the patient encounter.          Diagnosis  Final diagnoses:   Upper abdominal pain       Note Disclaimer: At [facility], we believe that sharing information builds trust and better relationships. You are receiving this note because you recently visited [facility]. It is possible you will see health information before a provider has talked with you about it. This kind of information can be easy to misunderstand. To help you fully understand what it means for your health, we urge you to discuss this note with your provider.       Micki Nolasco MD  04/20/25 9563

## 2025-04-23 ENCOUNTER — TELEPHONE (OUTPATIENT)
Dept: GASTROENTEROLOGY | Facility: CLINIC | Age: 45
End: 2025-04-23
Payer: MEDICARE

## 2025-04-23 NOTE — TELEPHONE ENCOUNTER
Caller: Sanaz Jiang    Relationship: Emergency Contact    Best call back number: [phone]    Requested Prescriptions:  ONDANSETRON(ZOFRAN) 4 MG TABLET  HYDROCODONE 5-325 MG TABLET           Pharmacy where request should be sent:  [facility]    Last office visit with prescribing clinician: 3/14/2025     Additional details provided by patient: SPOUSE STATED PT WOULD LIKE THESE REFILLED THAT HE HAD FROM WHEN HE WAS IN THE HOSPITAL TO HELP WITH THE PAIN LEVEL 6-7.     Does the patient have less than a 3 day supply:  [x] Yes  [] No    Would you like a call back once the refill request has been completed: [x] Yes [] No    Krista Ash Rep   04/23/25 15:21 EDT

## 2025-04-25 ENCOUNTER — TELEPHONE (OUTPATIENT)
Dept: GASTROENTEROLOGY | Facility: CLINIC | Age: 45
End: 2025-04-25
Payer: MEDICARE

## 2025-04-25 RX ORDER — ONDANSETRON 4 MG/1
4 TABLET, ORALLY DISINTEGRATING ORAL EVERY 8 HOURS PRN
Qty: 25 TABLET | Refills: 3 | Status: SHIPPED | OUTPATIENT
Start: 2025-04-25

## 2025-04-25 NOTE — TELEPHONE ENCOUNTER
I refilled his Zofran but we do not refill narcotics.  He will need to talk with his primary care provider.

## 2025-04-25 NOTE — TELEPHONE ENCOUNTER
Lakisha I got a mychart message from the pt needing an appt. He was in the ER on 4/18 and the soonest appt you have is 6/25 and he said that he needs a sooner appt.  Do have a work in appt that you want to give him?

## 2025-05-15 ENCOUNTER — OFFICE VISIT (OUTPATIENT)
Dept: GASTROENTEROLOGY | Facility: CLINIC | Age: 45
End: 2025-05-15
Payer: MEDICARE

## 2025-05-15 VITALS
HEIGHT: 69 IN | BODY MASS INDEX: 20.62 KG/M2 | TEMPERATURE: 98 F | SYSTOLIC BLOOD PRESSURE: 106 MMHG | HEART RATE: 71 BPM | DIASTOLIC BLOOD PRESSURE: 69 MMHG | WEIGHT: 139.2 LBS

## 2025-05-15 DIAGNOSIS — K21.9 GASTROESOPHAGEAL REFLUX DISEASE, UNSPECIFIED WHETHER ESOPHAGITIS PRESENT: ICD-10-CM

## 2025-05-15 DIAGNOSIS — I85.10 SECONDARY ESOPHAGEAL VARICES WITHOUT BLEEDING: ICD-10-CM

## 2025-05-15 DIAGNOSIS — K74.60 CIRRHOSIS OF LIVER WITHOUT ASCITES, UNSPECIFIED HEPATIC CIRRHOSIS TYPE: ICD-10-CM

## 2025-05-15 DIAGNOSIS — R10.10 PAIN OF UPPER ABDOMEN: Primary | ICD-10-CM

## 2025-05-15 PROCEDURE — 99214 OFFICE O/P EST MOD 30 MIN: CPT | Performed by: NURSE PRACTITIONER

## 2025-05-15 NOTE — PROGRESS NOTES
Chief Complaint   Patient presents with    Abdominal Pain       HPI    El Jiang is a  45 y.o. male here for a follow up visit for abdominal pain with a history of alcoholic related cirrhosis.    This patient is known to me and will also follow with Dr. Guillermo.    He has a history of decompensation with ascites and esophageal variceal bleed now recompensated. He also follows with transplant hepatology at [facility]. He has been abstinent from alcohol since 2020. MELD score has remained low and has not been recommended to undergo liver transplant.     Patient seen and evaluated by Dr. Moore in April for cholelithiasis.  Notes reviewed.  Surgical intervention was not recommended and patient was counseled on dietary and lifestyle modifications.    He was then seen in the emergency room on April 18 for complaints of upper abdominal pain.  CT abdomen and pelvis with contrast performed with findings of cirrhosis, portal hypertension, cholelithiasis and nonspecific gallbladder wall thickening in the setting of portal hypertension. White blood cell count and hemoglobin were normal, platelet count 75, normal liver function test.  Lipase 127.    On visit today he reports continued upper abdominal pain seems to localize to the epigastric and periumbilical area.  Pain not as severe as when he went to the emergency room rates it about a 3 out of 10.  Pain comes and goes.  No correlation with the eating or defecation.  Denies nausea, vomiting, dysphagia or odynophagia.  He takes Protonix 40 mg p.o. twice daily.  He is compliant with beta-blocker to treat esophageal varices.  He has had no issues with ascites or bilateral lower extremity edema.  He is trying to eat healthier after his visit with Dr. Moore.    Denies diarrhea, constipation, rectal bleeding or rectal pain.    Endoscopic history reviewed:     Colonoscopy (07/31/2024 14:43) - Normal ileum.  Polyps.  Hemorrhoids.  Recall 5 years.     Upper GI  "Endoscopy (07/31/2024 14:40) - Small grade 1 esophageal varices.  Erythematous mucosa in the antrum.  Erythematous duodenopathy.  Recall 1 year for surveillance.    Past Medical History:   Diagnosis Date    Acquired stuttering     WIFE STATES HE CAN BE HARD TO UNDERSTAND.. \"[employer]ron[employer] ACCENT AND STUTTERS\"    Alcoholism     Bleeding esophageal varices     Cholelithiasis     Cirrhosis     end-stage    H/O PAF (paroxysmal atrial fibrillation) (CMS/HCC)     Hepatosplenomegaly     secondary to cirrhosis    Hernia     History of alcohol abuse     History of anemia     History of ascites     History of sepsis     Hyperlipidemia        Past Surgical History:   Procedure Laterality Date    COLONOSCOPY N/A 07/31/2024    Procedure: COLONOSCOPY to cecum ti with hot snare polypectomies/biopsyies;  Surgeon: Erik Magaña MD;  Location: [facility] ENDOSCOPY;  Service: Gastroenterology;  Laterality: N/A;  pre  post polyps    ENDOSCOPY N/A 11/18/2020    Procedure: ESOPHAGOGASTRODUODENOSCOPY with esophageal banding x3;  Surgeon: Erik Magaña MD;  Location: [facility] ENDOSCOPY;  Service: Gastroenterology;  Laterality: N/A;  pre - cirrhosis of the liver and varices  post - gastritis, portal hypertensive gastropathy    ENDOSCOPY N/A 03/23/2022    Procedure: ESOPHAGOGASTRODUODENOSCOPY WITH COLD BIOPSIES AND BANDING x 5;  Surgeon: Erik Magaña MD;  Location: [facility] ENDOSCOPY;  Service: Gastroenterology;  Laterality: N/A;  HISTORY OF VARICES  VARICES, EROSIVE GASTRITIS    ENDOSCOPY N/A 06/01/2022    Procedure: ESOPHAGOGASTRODUODENOSCOPY;  Surgeon: rEik Magaña MD;  Location: [facility] ENDOSCOPY;  Service: Gastroenterology;  Laterality: N/A;  pre-hx varices  post-gastritis    ENDOSCOPY N/A 07/07/2023    Procedure: ESOPHAGOGASTRODUODENOSCOPY with varices banding;  Surgeon: Erik Magaña MD;  Location: [facility] ENDOSCOPY;  Service: Gastroenterology;  Laterality: N/A;  pre varices w/o bleeding  post duodenitis varice " banding 7 bands deployed    ENDOSCOPY N/A 10/09/2023    Procedure: ESOPHAGOGASTRODUODENOSCOPY WITH VARICIES BANDING X 5;  Surgeon: Erik Magaña MD;  Location:  [facility] ENDOSCOPY;  Service: Gastroenterology;  Laterality: N/A;  PRE: HISTORY OF VARICIES /   POST: VARICIES    ENDOSCOPY N/A 07/31/2024    Procedure: ESOPHAGOGASTRODUODENOSCOPY with cold biopsies;  Surgeon: Erik Magaña MD;  Location: [facility] ENDOSCOPY;  Service: Gastroenterology;  Laterality: N/A;  pre screen  hx esophageal varices  post gastritis duodenitis    ENDOSCOPY W/ BANDING  2020    varices    ENDOSCOPY W/ BANDING      HERNIA REPAIR      PARACENTESIS      900 cc fluid drained    UPPER GASTROINTESTINAL ENDOSCOPY         Scheduled Meds:     Continuous Infusions: No current facility-administered medications for this visit.      PRN Meds:     No Known Allergies    Social History     Socioeconomic History    Marital status:    Tobacco Use    Smoking status: Every Day     Current packs/day: 0.50     Average packs/day: 0.5 packs/day for 15.0 years (7.5 ttl pk-yrs)     Types: Cigarettes    Smokeless tobacco: Never    Tobacco comments:     quit 6 months ago   Vaping Use    Vaping status: Never Used   Substance and Sexual Activity    Alcohol use: Not Currently     Comment: H/O alcoholism (quit 08/2020)    Drug use: Yes     Frequency: 1.0 times per week     Types: Marijuana    Sexual activity: Yes     Partners: Female       Family History   Problem Relation Age of Onset    Ovarian cancer Mother     Throat cancer Father     Malig Hyperthermia Neg Hx        Review of Systems   Gastrointestinal:  Positive for abdominal pain.       Vitals:    05/15/25 1357   BP: 106/69   Pulse: 71   Temp: 98 °F (36.7 °C)       Physical Exam  Constitutional:       Appearance: He is well-developed.   Abdominal:      General: Bowel sounds are normal. There is no distension.      Palpations: Abdomen is soft. There is no mass.      Tenderness: There is no abdominal  tenderness. There is no guarding.      Hernia: A hernia is present.   Skin:     General: Skin is warm and dry.      Capillary Refill: Capillary refill takes less than 2 seconds.   Neurological:      Mental Status: He is alert and oriented to person, place, and time.   Psychiatric:         Behavior: Behavior normal.       Assessment    Diagnoses and all orders for this visit:    1. Pain of upper abdomen (Primary)    2. Cirrhosis of liver without ascites, unspecified hepatic cirrhosis type    3. Secondary esophageal varices without bleeding    4. Gastroesophageal reflux disease, unspecified whether esophagitis present     Plan    Pleasant 45-year-old male seen today in follow-up with continued upper abdominal pain recently seen and evaluated by surgical services who did not recommend laparoscopic cholecystectomy based on imaging and his current symptoms.  He does have a periumbilical hernia which is nonpainful and most of his discomfort is in the epigastric area.  As such recommend EGD with Dr. Guillermo at [facility] for further evaluation of symptoms.  Continue twice daily dosing PPI therapy.  [place] diet.  Continue beta-blocker and low-dose diuretics.  Await endoscopic findings for further recommendations and follow-up.         LAN Berry  [facility]  [address] [place] [address]  Office: [phone]    I spent 30 minutes caring for El on this date of service. This time includes time spent by me in the following activities: preparing for the visit, reviewing tests, obtaining and/or reviewing a separately obtained history, performing a medically appropriate examination and/or evaluation , counseling and educating the patient/family/caregiver, ordering medications, tests, or procedures, documenting information in the medical record, independently interpreting results and communicating that information with the patient/family/caregiver and care coordination.

## 2025-05-16 ENCOUNTER — TELEPHONE (OUTPATIENT)
Dept: GASTROENTEROLOGY | Facility: CLINIC | Age: 45
End: 2025-05-16
Payer: MEDICARE

## 2025-05-16 NOTE — TELEPHONE ENCOUNTER
Message from Lakisha:   Can you please add this patient on for an EGD with Dr. Guillermo on his outpatient scheduled for May 22? BB says okay     LM for pt to call back to schedule scope.     [FreeTextEntry1] : Patient with multiple episodes of STD proctitis.  He has known rectal scarring.  His last sigmoidoscopy and biopsies was 1 year ago.  Because of the risk of HPV I recommended that the patient follow-up with GI and undergo a repeat sigmoidoscopy or colonoscopy with biopsies. Patient stated understanding.  Also, I spoke with Dr. Mckoy and he will make the arrangements.  Patient will follow-up with me PRN.

## 2025-05-16 NOTE — TELEPHONE ENCOUNTER
Called pt 2X to schedule on 5/22, pt wife stated he will be out of town this day and first week of June. Message sent to Lakisha on secure chat

## 2025-08-04 RX ORDER — SPIRONOLACTONE 25 MG/1
25 TABLET ORAL DAILY
Qty: 90 TABLET | Refills: 3 | Status: SHIPPED | OUTPATIENT
Start: 2025-08-04

## 2025-08-04 RX ORDER — FUROSEMIDE 40 MG/1
40 TABLET ORAL DAILY
Qty: 90 TABLET | Refills: 3 | Status: SHIPPED | OUTPATIENT
Start: 2025-08-04

## 2025-08-04 RX ORDER — NADOLOL 40 MG/1
40 TABLET ORAL DAILY
Qty: 90 TABLET | Refills: 3 | Status: SHIPPED | OUTPATIENT
Start: 2025-08-04

## 2025-08-14 ENCOUNTER — HOSPITAL ENCOUNTER (OUTPATIENT)
Facility: HOSPITAL | Age: 45
Setting detail: HOSPITAL OUTPATIENT SURGERY
Discharge: HOME | End: 2025-08-14
Attending: INTERNAL MEDICINE | Admitting: INTERNAL MEDICINE
Payer: MEDICARE

## 2025-08-14 ENCOUNTER — ANESTHESIA (OUTPATIENT)
Dept: GASTROENTEROLOGY | Facility: HOSPITAL | Age: 45
End: 2025-08-14
Payer: MEDICARE

## 2025-08-14 ENCOUNTER — ANESTHESIA EVENT (OUTPATIENT)
Dept: GASTROENTEROLOGY | Facility: HOSPITAL | Age: 45
End: 2025-08-14
Payer: MEDICARE

## 2025-08-14 VITALS
HEIGHT: 69 IN | OXYGEN SATURATION: 100 % | HEART RATE: 55 BPM | BODY MASS INDEX: 20.57 KG/M2 | WEIGHT: 138.9 LBS | SYSTOLIC BLOOD PRESSURE: 92 MMHG | DIASTOLIC BLOOD PRESSURE: 58 MMHG | RESPIRATION RATE: 16 BRPM

## 2025-08-14 DIAGNOSIS — K74.60 CIRRHOSIS OF LIVER WITHOUT ASCITES, UNSPECIFIED HEPATIC CIRRHOSIS TYPE: ICD-10-CM

## 2025-08-14 DIAGNOSIS — K21.9 GASTROESOPHAGEAL REFLUX DISEASE, UNSPECIFIED WHETHER ESOPHAGITIS PRESENT: ICD-10-CM

## 2025-08-14 DIAGNOSIS — I85.10 SECONDARY ESOPHAGEAL VARICES WITHOUT BLEEDING: ICD-10-CM

## 2025-08-14 DIAGNOSIS — R10.10 PAIN OF UPPER ABDOMEN: ICD-10-CM

## 2025-08-14 PROCEDURE — 43239 EGD BIOPSY SINGLE/MULTIPLE: CPT | Performed by: INTERNAL MEDICINE

## 2025-08-14 PROCEDURE — 25010000002 GLYCOPYRROLATE 0.2 MG/ML SOLUTION

## 2025-08-14 PROCEDURE — 25010000002 LIDOCAINE 2% SOLUTION

## 2025-08-14 PROCEDURE — 88305 TISSUE EXAM BY PATHOLOGIST: CPT | Performed by: INTERNAL MEDICINE

## 2025-08-14 PROCEDURE — 25010000002 PROPOFOL 200 MG/20ML EMULSION

## 2025-08-14 PROCEDURE — 25010000002 PROPOFOL 1000 MG/100ML EMULSION

## 2025-08-14 PROCEDURE — 25810000003 LACTATED RINGERS PER 1000 ML: Performed by: INTERNAL MEDICINE

## 2025-08-14 RX ORDER — LIDOCAINE HYDROCHLORIDE 20 MG/ML
INJECTION, SOLUTION INFILTRATION; PERINEURAL AS NEEDED
Status: DISCONTINUED | OUTPATIENT
Start: 2025-08-14 | End: 2025-08-14 | Stop reason: SURG

## 2025-08-14 RX ORDER — PROPOFOL 10 MG/ML
INJECTION, EMULSION INTRAVENOUS AS NEEDED
Status: DISCONTINUED | OUTPATIENT
Start: 2025-08-14 | End: 2025-08-14 | Stop reason: SURG

## 2025-08-14 RX ORDER — PROPOFOL 10 MG/ML
INJECTION, EMULSION INTRAVENOUS CONTINUOUS PRN
Status: DISCONTINUED | OUTPATIENT
Start: 2025-08-14 | End: 2025-08-14 | Stop reason: SURG

## 2025-08-14 RX ORDER — GLYCOPYRROLATE 0.2 MG/ML
INJECTION INTRAMUSCULAR; INTRAVENOUS AS NEEDED
Status: DISCONTINUED | OUTPATIENT
Start: 2025-08-14 | End: 2025-08-14 | Stop reason: SURG

## 2025-08-14 RX ORDER — SODIUM CHLORIDE, SODIUM LACTATE, POTASSIUM CHLORIDE, CALCIUM CHLORIDE 600; 310; 30; 20 MG/100ML; MG/100ML; MG/100ML; MG/100ML
30 INJECTION, SOLUTION INTRAVENOUS CONTINUOUS PRN
Status: DISCONTINUED | OUTPATIENT
Start: 2025-08-14 | End: 2025-08-14 | Stop reason: HOSPADM

## 2025-08-14 RX ADMIN — SODIUM CHLORIDE, POTASSIUM CHLORIDE, SODIUM LACTATE AND CALCIUM CHLORIDE 30 ML/HR: 600; 310; 30; 20 INJECTION, SOLUTION INTRAVENOUS at 11:49

## 2025-08-14 RX ADMIN — PROPOFOL INJECTABLE EMULSION 100 MG: 10 INJECTION, EMULSION INTRAVENOUS at 12:30

## 2025-08-14 RX ADMIN — GLYCOPYRROLATE 0.2 MG: 0.2 INJECTION INTRAMUSCULAR; INTRAVENOUS at 12:30

## 2025-08-14 RX ADMIN — LIDOCAINE HYDROCHLORIDE 60 MG: 20 INJECTION, SOLUTION INFILTRATION; PERINEURAL at 12:30

## 2025-08-14 RX ADMIN — PROPOFOL 200 MCG/KG/MIN: 10 INJECTION, EMULSION INTRAVENOUS at 12:31

## 2025-08-15 LAB
CYTO UR: NORMAL
LAB AP CASE REPORT: NORMAL
PATH REPORT.FINAL DX SPEC: NORMAL
PATH REPORT.GROSS SPEC: NORMAL

## (undated) DEVICE — TUBING, SUCTION, 1/4" X 10', STRAIGHT: Brand: MEDLINE

## (undated) DEVICE — ADAPT CLN BIOGUARD AIR/H2O DISP

## (undated) DEVICE — SENSR O2 OXIMAX FNGR A/ 18IN NONSTR

## (undated) DEVICE — MSK ENDO PORT O2 POM ELITE CURAPLEX A/

## (undated) DEVICE — BLCK/BITE BLOX W/DENTL/RIM W/STRAP 54F

## (undated) DEVICE — CANN O2 ETCO2 FITS ALL CONN CO2 SMPL A/ 7IN DISP LF

## (undated) DEVICE — FRCP BX RADJAW4 NDL 2.8 240CM LG OG BX40

## (undated) DEVICE — MULTIPLE BAND LIGATOR: Brand: SPEEDBAND SUPERVIEW SUPER 7

## (undated) DEVICE — BITEBLOCK OMNI BLOC

## (undated) DEVICE — LN SMPL CO2 SHTRM SD STREAM W/M LUER

## (undated) DEVICE — KT ORCA ORCAPOD DISP STRL

## (undated) DEVICE — THE SINGLE USE ETRAP – POLYP TRAP IS USED FOR SUCTION RETRIEVAL OF ENDOSCOPICALLY REMOVED POLYPS.: Brand: ETRAP

## (undated) DEVICE — SNAR POLYP SENSATION STDOVL 27 240 BX40

## (undated) DEVICE — PATIENT RETURN ELECTRODE, SINGLE-USE, CONTACT QUALITY MONITORING, ADULT, WITH 9FT CORD, FOR PATIENTS WEIGING OVER 33LBS. (15KG): Brand: MEGADYNE

## (undated) DEVICE — SINGLE-USE BIOPSY FORCEPS: Brand: RADIAL JAW 4

## (undated) DEVICE — MSK PROC CURAPLEX O2 2/ADAPT 7FT